# Patient Record
Sex: FEMALE | Race: OTHER | HISPANIC OR LATINO
[De-identification: names, ages, dates, MRNs, and addresses within clinical notes are randomized per-mention and may not be internally consistent; named-entity substitution may affect disease eponyms.]

---

## 2017-01-06 ENCOUNTER — TRANSCRIPTION ENCOUNTER (OUTPATIENT)
Age: 72
End: 2017-01-06

## 2021-05-03 ENCOUNTER — APPOINTMENT (OUTPATIENT)
Dept: FAMILY MEDICINE | Facility: CLINIC | Age: 76
End: 2021-05-03
Payer: MEDICAID

## 2021-05-03 ENCOUNTER — NON-APPOINTMENT (OUTPATIENT)
Age: 76
End: 2021-05-03

## 2021-05-03 VITALS
WEIGHT: 145 LBS | DIASTOLIC BLOOD PRESSURE: 78 MMHG | TEMPERATURE: 97.1 F | SYSTOLIC BLOOD PRESSURE: 110 MMHG | BODY MASS INDEX: 26.68 KG/M2 | HEIGHT: 62 IN | RESPIRATION RATE: 16 BRPM

## 2021-05-03 DIAGNOSIS — Z23 ENCOUNTER FOR IMMUNIZATION: ICD-10-CM

## 2021-05-03 DIAGNOSIS — Z13.220 ENCOUNTER FOR SCREENING FOR LIPOID DISORDERS: ICD-10-CM

## 2021-05-03 DIAGNOSIS — E66.3 OVERWEIGHT: ICD-10-CM

## 2021-05-03 DIAGNOSIS — Z13.820 ENCOUNTER FOR SCREENING FOR OSTEOPOROSIS: ICD-10-CM

## 2021-05-03 DIAGNOSIS — Z11.4 ENCOUNTER FOR SCREENING FOR HUMAN IMMUNODEFICIENCY VIRUS [HIV]: ICD-10-CM

## 2021-05-03 DIAGNOSIS — Z12.31 ENCOUNTER FOR SCREENING MAMMOGRAM FOR MALIGNANT NEOPLASM OF BREAST: ICD-10-CM

## 2021-05-03 DIAGNOSIS — Z00.00 ENCOUNTER FOR GENERAL ADULT MEDICAL EXAMINATION W/OUT ABNORMAL FINDINGS: ICD-10-CM

## 2021-05-03 DIAGNOSIS — Z13.6 ENCOUNTER FOR SCREENING FOR CARDIOVASCULAR DISORDERS: ICD-10-CM

## 2021-05-03 DIAGNOSIS — Z80.0 FAMILY HISTORY OF MALIGNANT NEOPLASM OF DIGESTIVE ORGANS: ICD-10-CM

## 2021-05-03 DIAGNOSIS — Z13.1 ENCOUNTER FOR SCREENING FOR DIABETES MELLITUS: ICD-10-CM

## 2021-05-03 DIAGNOSIS — Z11.59 ENCOUNTER FOR SCREENING FOR OTHER VIRAL DISEASES: ICD-10-CM

## 2021-05-03 PROCEDURE — 99387 INIT PM E/M NEW PAT 65+ YRS: CPT | Mod: 25

## 2021-05-03 PROCEDURE — 99072 ADDL SUPL MATRL&STAF TM PHE: CPT

## 2021-05-03 PROCEDURE — 99203 OFFICE O/P NEW LOW 30 MIN: CPT | Mod: 25

## 2021-05-03 PROCEDURE — 93000 ELECTROCARDIOGRAM COMPLETE: CPT | Mod: 59

## 2021-05-03 NOTE — PHYSICAL EXAM
[No Acute Distress] : no acute distress [Well Nourished] : well nourished [Well Developed] : well developed [Well-Appearing] : well-appearing [Normal Voice/Communication] : normal voice/communication [Normal Sclera/Conjunctiva] : normal sclera/conjunctiva [PERRL] : pupils equal round and reactive to light [EOMI] : extraocular movements intact [Normal Outer Ear/Nose] : the outer ears and nose were normal in appearance [Normal Oropharynx] : the oropharynx was normal [Normal TMs] : both tympanic membranes were normal [Normal Nasal Mucosa] : the nasal mucosa was normal [No JVD] : no jugular venous distention [No Lymphadenopathy] : no lymphadenopathy [Supple] : supple [Thyroid Normal, No Nodules] : the thyroid was normal and there were no nodules present [No Respiratory Distress] : no respiratory distress  [No Accessory Muscle Use] : no accessory muscle use [Clear to Auscultation] : lungs were clear to auscultation bilaterally [Normal Rate] : normal rate  [Regular Rhythm] : with a regular rhythm [Normal S1, S2] : normal S1 and S2 [No Murmur] : no murmur heard [No Carotid Bruits] : no carotid bruits [No Abdominal Bruit] : a ~M bruit was not heard ~T in the abdomen [Pedal Pulses Present] : the pedal pulses are present [No Edema] : there was no peripheral edema [No Palpable Aorta] : no palpable aorta [No Extremity Clubbing/Cyanosis] : no extremity clubbing/cyanosis [Soft] : abdomen soft [Non Tender] : non-tender [Non-distended] : non-distended [No Masses] : no abdominal mass palpated [No HSM] : no HSM [Normal Bowel Sounds] : normal bowel sounds [Normal Supraclavicular Nodes] : no supraclavicular lymphadenopathy [Normal Posterior Cervical Nodes] : no posterior cervical lymphadenopathy [Normal Anterior Cervical Nodes] : no anterior cervical lymphadenopathy [No CVA Tenderness] : no CVA  tenderness [No Spinal Tenderness] : no spinal tenderness [No Joint Swelling] : no joint swelling [Grossly Normal Strength/Tone] : grossly normal strength/tone [No Rash] : no rash [Coordination Grossly Intact] : coordination grossly intact [No Focal Deficits] : no focal deficits [Normal Gait] : normal gait [Cranial Nerves Oculomotor (III)] : the extraocular motions were intact [Cranial Nerves Trigeminal (V)] : sensation to the face and masseter strength were intact [Cranial Nerves Facial (VII)] : facial strength was intact bilaterally [Cranial Nerves Accessory (XI - Cranial And Spinal)] : shoulder shrug was intact bilaterally [Cranial Nerves Hypoglossal (XII)] : there was no tongue deviation with protrusion [Sensation Tactile Decrease] : light touch was intact [2+] : left 2+ [Speech Grossly Normal] : speech grossly normal [Normal Affect] : the affect was normal [Normal Mood] : the mood was normal [Normal Insight/Judgement] : insight and judgment were intact

## 2021-05-07 LAB
ALBUMIN SERPL ELPH-MCNC: 4.8 G/DL
ALP BLD-CCNC: 77 U/L
ALT SERPL-CCNC: 17 U/L
ANION GAP SERPL CALC-SCNC: 13 MMOL/L
AST SERPL-CCNC: 20 U/L
BASOPHILS # BLD AUTO: 0.03 K/UL
BASOPHILS NFR BLD AUTO: 0.5 %
BILIRUB SERPL-MCNC: 0.3 MG/DL
BUN SERPL-MCNC: 16 MG/DL
CALCIUM SERPL-MCNC: 9.8 MG/DL
CHLORIDE SERPL-SCNC: 102 MMOL/L
CHOLEST SERPL-MCNC: 288 MG/DL
CO2 SERPL-SCNC: 26 MMOL/L
CREAT SERPL-MCNC: 0.75 MG/DL
EOSINOPHIL # BLD AUTO: 0.2 K/UL
EOSINOPHIL NFR BLD AUTO: 3.1 %
ESTIMATED AVERAGE GLUCOSE: 120 MG/DL
GLUCOSE SERPL-MCNC: 106 MG/DL
HBA1C MFR BLD HPLC: 5.8 %
HCT VFR BLD CALC: 44 %
HCV AB SER QL: NONREACTIVE
HCV S/CO RATIO: 0.12 S/CO
HDLC SERPL-MCNC: 61 MG/DL
HGB BLD-MCNC: 13.9 G/DL
HIV1+2 AB SPEC QL IA.RAPID: NONREACTIVE
IMM GRANULOCYTES NFR BLD AUTO: 0.2 %
LDLC SERPL CALC-MCNC: 184 MG/DL
LYMPHOCYTES # BLD AUTO: 2.54 K/UL
LYMPHOCYTES NFR BLD AUTO: 39.4 %
MAN DIFF?: NORMAL
MCHC RBC-ENTMCNC: 29.7 PG
MCHC RBC-ENTMCNC: 31.6 GM/DL
MCV RBC AUTO: 94 FL
MONOCYTES # BLD AUTO: 0.57 K/UL
MONOCYTES NFR BLD AUTO: 8.8 %
NEUTROPHILS # BLD AUTO: 3.1 K/UL
NEUTROPHILS NFR BLD AUTO: 48 %
NONHDLC SERPL-MCNC: 227 MG/DL
PLATELET # BLD AUTO: 298 K/UL
POTASSIUM SERPL-SCNC: 3.9 MMOL/L
PROT SERPL-MCNC: 7.9 G/DL
RBC # BLD: 4.68 M/UL
RBC # FLD: 12.9 %
SODIUM SERPL-SCNC: 141 MMOL/L
TRIGL SERPL-MCNC: 213 MG/DL
TSH SERPL-ACNC: 2.52 UIU/ML
WBC # FLD AUTO: 6.45 K/UL

## 2021-05-07 NOTE — HISTORY OF PRESENT ILLNESS
[FreeTextEntry1] : establish care [de-identified] : Ms. JULIA ALCANTARA is a pleasant 75 year old female with PMH of hypothyroidism s/p thyroid ablation due to hyperthyroidism? who comes in to the office accompanied by her daughter  Arthur to establish care and for sleeping problems. Reports that only sleeps for about 3 hrs, then she wakes up or any noise but she can't sleep deeply. This has happened for months already.\par Her levothyroxine is being managed by endocrinology\par AS per daughter she has hstory of depression, last time 1 year ago, currently not on medications\par Has not seen a PCP wince 2016\par \par Endocrinologist: Matt Mccall

## 2021-05-07 NOTE — HEALTH RISK ASSESSMENT
[Yes] : Yes [2 - 4 times a month (2 pts)] : 2-4 times a month (2 points) [1 or 2 (0 pts)] : 1 or 2 (0 points) [Never (0 pts)] : Never (0 points) [No] : In the past 12 months have you used drugs other than those required for medical reasons? No [0] : 2) Feeling down, depressed, or hopeless: Not at all (0) [Patient declined PAP Smear] : Patient declined PAP Smear [HIV Test offered] : HIV Test offered [Hepatitis C test offered] : Hepatitis C test offered [With Significant Other] : lives with significant other [] :  [Fully functional (bathing, dressing, toileting, transferring, walking, feeding)] : Fully functional (bathing, dressing, toileting, transferring, walking, feeding) [Fully functional (using the telephone, shopping, preparing meals, housekeeping, doing laundry, using] : Fully functional and needs no help or supervision to perform IADLs (using the telephone, shopping, preparing meals, housekeeping, doing laundry, using transportation, managing medications and managing finances) [Patient/Caregiver not ready to engage] : Patient/Caregiver not ready to engage [Patient reported colonoscopy was abnormal] : Patient reported colonoscopy was abnormal [] : No [Audit-CScore] : 2 [[id number]] : 0 [Reports changes in hearing] : Reports no changes in hearing [Reports changes in vision] : Reports no changes in vision [MammogramComments] : w [PapSmearComments] : never had it done, not interested [ColonoscopyDate] : 2010 [ColonoscopyComments] : had polyps and diverticulosis   [AdvancecareDate] : 05/03/2021

## 2021-05-07 NOTE — ASSESSMENT
[FreeTextEntry1] : \par In regards annual physical exam: \par Not interested in any vaccinations today\par Lab test ordered as noted.\par Colon cancer screen: referring to GI for colonoscopy. As per daughter she had diverticulosis and polyps in a colonoscopy in 2010 but I don't have the records\par Ordering mammogram. Patient reports a sister with an abnormal breast exam. \par Has never had a pap smear nor is interested in one.\par Osteoporosis screen: DEXA ordered\par EKG reviewed: no acute ST or T-wave abnormalities, patient without cardiac symptoms. \par As per daughter Arthur she has history of depression but patient denies it. Arthur reports that her last crisis was 1 year ago and that has been off medications since then. Today's PHQ-2 is 0\par  \par Regarding advanced care planning: I discussed with this patient the different options and importance of advance care planning, including but not limited to health care proxy designation. Please refer to advance care planning session of this note for detailed information \par \par Hypothyroidism \par On levothyroxine by endocrino\par Checking TSH\par \par Insomnia\par Advised to take melatonin\par Referring to neurology\par \par Knee pain\par Uses voltaren intermittently with good response. Renewing but I will need her pharmacy info to send it\par Takes NSAIDs and ASA. Advised to avoid them as they could hurt her stomach\par \par Will need records from previous PCP and endocrinology\par Return to care: within 3 months or earlier if needed\par Call or return for any questions

## 2021-05-07 NOTE — ADDENDUM
[FreeTextEntry1] : 2:03 PM05/07/2021 Test results from 05/03/2021 reviewed and discussed with patient and daughter Arthur over the phone. TSH in normal wange, to continue same dose of levothyroxine. Pe-DM: HbA1C 5.8% and HLD on lipid panel. LIfestyle modifications discussed and starting atorvastatin 40 mg QHS. WIll check LFTs within 6 weeks. Rest of labs unremarkable. Still pending mammogram, DEXA and to see specialists

## 2021-07-12 ENCOUNTER — RX RENEWAL (OUTPATIENT)
Age: 76
End: 2021-07-12

## 2021-08-26 ENCOUNTER — APPOINTMENT (OUTPATIENT)
Dept: RADIOLOGY | Facility: CLINIC | Age: 76
End: 2021-08-26

## 2021-08-26 ENCOUNTER — RESULT REVIEW (OUTPATIENT)
Age: 76
End: 2021-08-26

## 2021-08-26 ENCOUNTER — OUTPATIENT (OUTPATIENT)
Dept: OUTPATIENT SERVICES | Facility: HOSPITAL | Age: 76
LOS: 1 days | End: 2021-08-26
Payer: MEDICAID

## 2021-08-26 DIAGNOSIS — Z00.8 ENCOUNTER FOR OTHER GENERAL EXAMINATION: ICD-10-CM

## 2021-08-26 PROCEDURE — 77085 DXA BONE DENSITY AXL VRT FX: CPT

## 2021-08-26 PROCEDURE — 77085 DXA BONE DENSITY AXL VRT FX: CPT | Mod: 26

## 2021-08-27 ENCOUNTER — APPOINTMENT (OUTPATIENT)
Dept: FAMILY MEDICINE | Facility: CLINIC | Age: 76
End: 2021-08-27
Payer: COMMERCIAL

## 2021-08-27 VITALS
SYSTOLIC BLOOD PRESSURE: 106 MMHG | TEMPERATURE: 97 F | DIASTOLIC BLOOD PRESSURE: 80 MMHG | HEART RATE: 84 BPM | HEIGHT: 62 IN | WEIGHT: 145 LBS | BODY MASS INDEX: 26.68 KG/M2

## 2021-08-27 DIAGNOSIS — M41.80 OTHER FORMS OF SCOLIOSIS, SITE UNSPECIFIED: ICD-10-CM

## 2021-08-27 DIAGNOSIS — M25.569 PAIN IN UNSPECIFIED KNEE: ICD-10-CM

## 2021-08-27 PROCEDURE — 99215 OFFICE O/P EST HI 40 MIN: CPT | Mod: 25

## 2021-08-30 LAB
CHOLEST SERPL-MCNC: 282 MG/DL
HDLC SERPL-MCNC: 55 MG/DL
LDLC SERPL CALC-MCNC: 199 MG/DL
NONHDLC SERPL-MCNC: 228 MG/DL
TRIGL SERPL-MCNC: 143 MG/DL

## 2021-08-30 NOTE — HISTORY OF PRESENT ILLNESS
[FreeTextEntry1] : follow up [de-identified] : Ms. JULIA ALCANTARA is a pleasant 75 year old female with PMH of depression, hypothyroidism s/p thyroid ablation due to hyperthyroidism? managed by endocrinology, AMIE who comes in to the office accompanied by her daughter Arthur for medical condition follow up and for DEXA results. \par Patient was started on atorvastatin recently for HLD, was advised to come for repeat LFTs but patient missed her appointment. Patient has not been taking her medications because she vomited and would rather naturists ways. \par Patient didn’t do her mammogram, she is not interested in doing so.\par As per daughter she has history of depression, last time 1 year ago, currently not on medications. Felicity thinks that patient is depressed again as she has been more quiet, sad, less conversant. She has not been o medications for > 1 year now since they moved from [place] and has not seen a psychiatrist. Patient denies suicidal thoughts or other complaints\par I referred them to neurology last time due to insomnia but they were not able to make an appointment as they were told that she needs a psychiatrist.\par \par Endocrinologist: Matt Mccall

## 2021-08-30 NOTE — ADDENDUM
[FreeTextEntry1] : 11:41 AM08/30/2021 Test results from 08/27/2021 reviewed and discussed with patient's marinoter over the phone\par HLD, not improving. Patient was advised to restart her statins during last visit. \par Rest of labs unremarkable

## 2021-08-30 NOTE — ASSESSMENT
[FreeTextEntry1] : \par In regards to hyperlipidemia:\par Lifestyle modifications discussed\par Advised regarding the benefits of statins. Patient agrees to try them again\par Checking lipid panel but may not be improving as she didn't take the statins\par \par Depression\par Hsa not seen a psychiatry for > 1 year. REferring to another one\par She was on Mirtazapine, unclear dose. Will restart her on 15 mg QD\par Denies suicidal ideations\par Denies having weapons at home\par Has good support from daughter Arthur\par I am concerned about other overlapping mood disorder like bipolar or other conditions either undiagnosed or that are known to her previous psychiatrist. Advised that will need the records.\par Ordering a Head CT\par \par Osteoporosis\par DEXA results noted. Patient has another chart where the results were sent from the imaging place.\par Denies history of fractures\par Referring to rheumatology\par \par Insomnia\par Referring to neurology again\par Ordering Head CT as noted above\par \par Chronic knee pain\par Patient requests a Voltaren renewal\par \par Not interested in mammogram. Advised regarding the risks and benefits including but not limited to breast cancer and death. As per Arthur, patient has never been interested in doing mammograms. \par \par Has pending GI, reports that has been waiting for the appointments. Will try to expedite it.\par Return to care: within q month for depression follow up or earlier if needed\par Call or return for any questions

## 2021-08-30 NOTE — HISTORY OF PRESENT ILLNESS
[FreeTextEntry1] : follow up [de-identified] : Ms. JULIA ALCANTARA is a pleasant 75 year old female with PMH of depression, hypothyroidism s/p thyroid ablation due to hyperthyroidism? managed by endocrinology, AMIE who comes in to the office accompanied by her daughter Arthur for medical condition follow up and for DEXA results. \par Patient was started on atorvastatin recently for HLD, was advised to come for repeat LFTs but patient missed her appointment. Patient has not been taking her medications because she vomited and would rather naturists ways. \par Patient didn’t do her mammogram, she is not interested in doing so.\par As per daughter she has history of depression, last time 1 year ago, currently not on medications. Felicity thinks that patient is depressed again as she has been more quiet, sad, less conversant. She has not been o medications for > 1 year now since they moved from [place] and has not seen a psychiatrist. Patient denies suicidal thoughts or other complaints\par I referred them to neurology last time due to insomnia but they were not able to make an appointment as they were told that she needs a psychiatrist.\par \par Endocrinologist: Matt Mccall

## 2021-09-24 ENCOUNTER — APPOINTMENT (OUTPATIENT)
Dept: FAMILY MEDICINE | Facility: CLINIC | Age: 76
End: 2021-09-24
Payer: COMMERCIAL

## 2021-09-24 DIAGNOSIS — G47.00 INSOMNIA, UNSPECIFIED: ICD-10-CM

## 2021-09-24 PROCEDURE — 99443: CPT | Mod: 95

## 2021-09-24 NOTE — HISTORY OF PRESENT ILLNESS
[Home] : at home, [unfilled] , at the time of the visit. [Medical Office: ([facility])___] : at the medical office located in  [Family Member] : family member [Verbal consent obtained from patient] : the patient, [unfilled] [de-identified] : Ms. JULIA ALCANTARA is a pleasant 75 year old female with PMH of depression, hypothyroidism s/p thyroid ablation due to hyperthyroidism? managed by endocrinology, AMIE who is being seen via telephone visit with her daughter Arthur for medical condition follow up.\par Due to her depression I restarted her mirtazapine, at 15 mg 1 month ago (she was > 1 year out of it). As per Arthur she still has a depressed mood, she still doesn't leave her room as much but she is sleeping better, now, she is watching TV. She doesn't talk about suicidal thoughts. She was referred to neurology and a Head CT was ordered but she has still to make those appointments\par Patient still doesn't want to take the statins as discussed during last visit.\par Patient didn’t do her mammogram, she is not interested in doing so.\par She has not been o medications for > 1 year now since they moved from [place] and has not seen a psychiatrist. I referred her to psych again on 08/20/2021, she still has to see them\par Patient is also pending to  endocrinology.\par \par Endocrinologist: Matt Mccall

## 2021-09-24 NOTE — ASSESSMENT
[FreeTextEntry1] : \par Depression\par Has not seen a psychiatry for > 1 year. Was referred last time.\par She was on Mirtazapine, unclear dose. I restarted it at 15 mg QD. There is minimal improvement. \par Increasing the dose to 30 mg QD\par As per daughter Arthur she denies suicidal ideations\par Has good support from daughter Arthur\par I am concerned about other overlapping mood disorder like bipolar or other conditions either undiagnosed or that are known to her previous psychiatrist. Advised that will need the records.\par Pending Head CT\par \par In regards to hyperlipidemia:\par Lipid panel not in target\par Patient doesn’t want to take statins, risk and benefits were discussed during previous visit. Daughter Arthur has been trying to give it to her but she doesn't want to take it.\par \par Insomnia\par She is sleeping better now\par Pending to see neurology\par Pending Head CT as noted above\par \par Osteoporosis\par Still pending to see rheumatology\par \par Hypothyroidism\par managed by endocrinology\par Not interested in mammogram or in taking statins (see last note)\par Has pending GI appointment.\par Return to care: within 2 weeks for depression follow up or earlier if needed\par Call or return for any questions

## 2021-10-11 ENCOUNTER — APPOINTMENT (OUTPATIENT)
Dept: GASTROENTEROLOGY | Facility: CLINIC | Age: 76
End: 2021-10-11
Payer: MEDICAID

## 2021-10-11 VITALS
OXYGEN SATURATION: 98 % | RESPIRATION RATE: 14 BRPM | TEMPERATURE: 97.6 F | WEIGHT: 138 LBS | DIASTOLIC BLOOD PRESSURE: 76 MMHG | HEIGHT: 62 IN | BODY MASS INDEX: 25.4 KG/M2 | HEART RATE: 86 BPM | SYSTOLIC BLOOD PRESSURE: 112 MMHG

## 2021-10-11 DIAGNOSIS — Z86.39 PERSONAL HISTORY OF OTHER ENDOCRINE, NUTRITIONAL AND METABOLIC DISEASE: ICD-10-CM

## 2021-10-11 DIAGNOSIS — M19.90 UNSPECIFIED OSTEOARTHRITIS, UNSPECIFIED SITE: ICD-10-CM

## 2021-10-11 DIAGNOSIS — Z78.9 OTHER SPECIFIED HEALTH STATUS: ICD-10-CM

## 2021-10-11 DIAGNOSIS — Z12.11 ENCOUNTER FOR SCREENING FOR MALIGNANT NEOPLASM OF COLON: ICD-10-CM

## 2021-10-11 PROCEDURE — 99203 OFFICE O/P NEW LOW 30 MIN: CPT

## 2021-10-11 RX ORDER — DICLOFENAC SODIUM 10 MG/G
1 GEL TOPICAL
Refills: 0 | Status: DISCONTINUED | COMMUNITY
End: 2021-10-11

## 2021-10-11 RX ORDER — ACETAMINOPHEN 325 MG/1
325 TABLET, FILM COATED ORAL EVERY 6 HOURS
Qty: 40 | Refills: 0 | Status: DISCONTINUED | COMMUNITY
Start: 2021-05-07 | End: 2021-10-11

## 2021-10-11 NOTE — PHYSICAL EXAM
[General Appearance - Alert] : alert [General Appearance - Well Nourished] : well nourished [General Appearance - In No Acute Distress] : in no acute distress [General Appearance - Well Developed] : well developed [General Appearance - Well-Appearing] : healthy appearing [PERRL With Normal Accommodation] : pupils were equal in size, round, and reactive to light [Sclera] : the sclera and conjunctiva were normal [Extraocular Movements] : extraocular movements were intact [Outer Ear] : the ears and nose were normal in appearance [Oropharynx] : the oropharynx was normal [Neck Appearance] : the appearance of the neck was normal [Jugular Venous Distention Increased] : there was no jugular-venous distention [Neck Cervical Mass (___cm)] : no neck mass was observed [Thyroid Diffuse Enlargement] : the thyroid was not enlarged [Thyroid Nodule] : there were no palpable thyroid nodules [Auscultation Breath Sounds / Voice Sounds] : lungs were clear to auscultation bilaterally [Heart Rate And Rhythm] : heart rate was normal and rhythm regular [Heart Sounds] : normal S1 and S2 [Heart Sounds Gallop] : no gallops [Murmurs] : no murmurs [Heart Sounds Pericardial Friction Rub] : no pericardial rub [Bowel Sounds] : normal bowel sounds [Abdomen Soft] : soft [Abdomen Tenderness] : non-tender [] : no hepato-splenomegaly [Abdomen Mass (___ Cm)] : no abdominal mass palpated [No CVA Tenderness] : no ~M costovertebral angle tenderness [Abnormal Walk] : normal gait [Musculoskeletal - Swelling] : no joint swelling seen [Skin Color & Pigmentation] : normal skin color and pigmentation [Skin Turgor] : normal skin turgor [Oriented To Time, Place, And Person] : oriented to person, place, and time [FreeTextEntry1] : Deferred until colonoscopy

## 2021-10-11 NOTE — HISTORY OF PRESENT ILLNESS
[None] : had no significant interval events [Heartburn] : denies heartburn [Nausea] : denies nausea [Vomiting] : denies vomiting [Diarrhea] : denies diarrhea [Constipation] : denies constipation [Yellow Skin Or Eyes (Jaundice)] : denies jaundice [Abdominal Pain] : denies abdominal pain [Abdominal Swelling] : denies abdominal swelling [Rectal Pain] : denies rectal pain [Abdominal Surgery] : abdominal surgery [Appendectomy] : appendectomy [Wt Gain ___ Lbs] : no recent weight gain [GERD] : no gastroesophageal reflux disease [Wt Loss ___ Lbs] : no recent weight loss [Hiatus Hernia] : no hiatus hernia [Peptic Ulcer Disease] : no peptic ulcer disease [Pancreatitis] : no pancreatitis [Cholelithiasis] : no cholelithiasis [Kidney Stone] : no kidney stone [Inflammatory Bowel Disease] : no inflammatory bowel disease [Irritable Bowel Syndrome] : no irritable bowel syndrome [Diverticulitis] : no diverticulitis [Alcohol Abuse] : no alcohol abuse [Malignancy] : no malignancy [Cholecystectomy] : no cholecystectomy [de-identified] : This is the patient's first visit here [de-identified] : Patient presents for initial evaluation for screening colonoscopy having had a negative colonoscopy roughly 11 years ago.  She was accompanied by her daughter who states that when she had her colonoscopy done 11 years ago it could not be completed because she had a very tortuous colon and she was sent for further imaging of her colon with either barium enema or virtual colonoscopy which showed no colonic pathology.  She has had no subsequent colon cancer screening and has no complaints of constipation or diarrhea or abdominal pain or unexplained weight loss or anorexia or gross hematochezia.

## 2021-10-11 NOTE — ASSESSMENT
[FreeTextEntry1] : Impression: Colon cancer screening rule out colonic neoplasm.  Patient's last colonoscopy was 11 years ago.\par \par Recommendations: Repeat low rescreening colonoscopy was advised for continued CRC screening.  The risk versus benefits of repeat colonoscopy and intravenous sedation, and alternative testing such as virtual colonoscopy, were individually explained to the patient today via her daughter who accompanied the patient and acted as an  for her.  Both the patient and her daughter appeared to understand all of the above and were agreeable to proceeding with repeat colonoscopy.  Her ASA classification is 2.  She will be prepped with [facility] and is medically optimized for the proposed colonoscopy and appeared to understand all of the above instructions, information, and management plan.

## 2021-10-14 ENCOUNTER — RX RENEWAL (OUTPATIENT)
Age: 76
End: 2021-10-14

## 2021-10-18 ENCOUNTER — APPOINTMENT (OUTPATIENT)
Dept: CT IMAGING | Facility: CLINIC | Age: 76
End: 2021-10-18
Payer: COMMERCIAL

## 2021-10-18 ENCOUNTER — OUTPATIENT (OUTPATIENT)
Dept: OUTPATIENT SERVICES | Facility: HOSPITAL | Age: 76
LOS: 1 days | End: 2021-10-18
Payer: MEDICAID

## 2021-10-18 ENCOUNTER — RESULT REVIEW (OUTPATIENT)
Age: 76
End: 2021-10-18

## 2021-10-18 DIAGNOSIS — G47.00 INSOMNIA, UNSPECIFIED: ICD-10-CM

## 2021-10-18 DIAGNOSIS — F32.A DEPRESSION, UNSPECIFIED: ICD-10-CM

## 2021-10-18 PROCEDURE — 70450 CT HEAD/BRAIN W/O DYE: CPT

## 2021-10-18 PROCEDURE — 70450 CT HEAD/BRAIN W/O DYE: CPT | Mod: 26

## 2021-10-22 ENCOUNTER — APPOINTMENT (OUTPATIENT)
Dept: FAMILY MEDICINE | Facility: CLINIC | Age: 76
End: 2021-10-22
Payer: COMMERCIAL

## 2021-10-22 ENCOUNTER — LABORATORY RESULT (OUTPATIENT)
Age: 76
End: 2021-10-22

## 2021-10-22 VITALS
TEMPERATURE: 98 F | HEIGHT: 62 IN | DIASTOLIC BLOOD PRESSURE: 75 MMHG | BODY MASS INDEX: 25.4 KG/M2 | WEIGHT: 138 LBS | SYSTOLIC BLOOD PRESSURE: 140 MMHG

## 2021-10-22 DIAGNOSIS — R41.82 ALTERED MENTAL STATUS, UNSPECIFIED: ICD-10-CM

## 2021-10-22 PROCEDURE — 99215 OFFICE O/P EST HI 40 MIN: CPT | Mod: 25

## 2021-10-27 LAB
ALBUMIN SERPL ELPH-MCNC: 4.4 G/DL
ALP BLD-CCNC: 85 U/L
ALT SERPL-CCNC: 18 U/L
ANION GAP SERPL CALC-SCNC: 15 MMOL/L
AST SERPL-CCNC: 17 U/L
BILIRUB SERPL-MCNC: 0.4 MG/DL
BUN SERPL-MCNC: 14 MG/DL
CALCIUM SERPL-MCNC: 9.8 MG/DL
CHLORIDE SERPL-SCNC: 103 MMOL/L
CHOLEST SERPL-MCNC: 288 MG/DL
CO2 SERPL-SCNC: 22 MMOL/L
CREAT SERPL-MCNC: 0.68 MG/DL
GLUCOSE SERPL-MCNC: 90 MG/DL
HDLC SERPL-MCNC: 48 MG/DL
LDLC SERPL CALC-MCNC: 198 MG/DL
NONHDLC SERPL-MCNC: 240 MG/DL
POTASSIUM SERPL-SCNC: 4.5 MMOL/L
PROT SERPL-MCNC: 7.3 G/DL
SODIUM SERPL-SCNC: 140 MMOL/L
TRIGL SERPL-MCNC: 214 MG/DL
TSH SERPL-ACNC: 12.8 UIU/ML

## 2021-10-27 NOTE — DATA REVIEWED
[FreeTextEntry1] : Additional time spent outside of H&P in I reviewing information including but not limited to previous notes, imaging results and laboratories for this patient

## 2021-10-27 NOTE — ADDENDUM
[FreeTextEntry1] : 1:22 PM10/27/2021 Test results from  reviewed and discussed with patient over the phone\par HLD, not in target. Patient is on statins, every other day. Encouraged daily but patient rather to continue it every other day. Lifestyle modifications discussed. Patient will ask her endocrinologist. Discussed with daughter Gianna.\par TSH not in target. would increase dose ot levothyroxine. Her hypothyroidism is managed by endocrino Dr Gutierrez. Discussed with patient's daughter Gianna. They will ask endocrinology. WIll send this results to Dr Gutierrez\par Rest of labs unremarkable

## 2021-10-27 NOTE — HISTORY OF PRESENT ILLNESS
[FreeTextEntry1] : follow up [de-identified] : Ms. JULIA ALCANTARA is a pleasant 75 year old female with PMH of depression, hypothyroidism s/p thyroid ablation due to hyperthyroidism? managed by endocrinology, HLADRIEL who comes in to the office with her daughter Arthur for medical condition follow up.\par Due to her depression I increased her mirtazapine to 30 mg 1 month ago (she was > 1 year out of it). As per Arthur she still has a depressed mood but is improving, she started to get out of her room now spends some time with her, is watching the news. She doesn't talk about suicidal thoughts. Head CT was unremarkable on 10/18/2021 She was referred to psychiatry, neurology but she has still to make those appointments. She saw GI for colon cancer screen.\par Declines mammogram\par Patient still doesn't want to take the statins as discussed during last visit.\par Patient didn’t do her mammogram, she is not interested in doing so.\par \par [place] [facility] [phone]

## 2021-10-27 NOTE — ASSESSMENT
[FreeTextEntry1] : \par HLD\par Is taking statins, every other day\par Checking a CMP and lipid panel\par \par Depression\par Patient is more talkative today\par Improving on Mirtazapine, continue 30 mg QD for now. If no improvement next month will increase it to 45 mg QD\par No suicidal/homicidal thoughts\par Good support from her daughter\par Pending to see psychiatry\par \par Neck pain\par Seems muscular, is chronic\par No red flags\par Will try voltaren cream and Tylenol\par \par Altered mental state\par Unclear if is dementia or related to her depression \par Heat CT negative\par Was referred to neuro for further assessment, pending to see them\par \par Osteoporosis\par Last DEXA: 08/26/2021\par Tried some unspecified oral medication in the past, once/month? COuld have been Ibandronate? But I don't have those records\par Has appointment with rheumatology on jan/2021\par Was on unspecified medications in the past\par Continue Vit D and calcium, will ask her endocrinology who normally sends it\par \par Hypothyroidism\par On levothyroxine. managed by endocrino\par Checking her TSH as per daughter's request so they will give the results to her endocrinology\par \par Declines all vaccinations, COVID-19 vaccine encouraged, patient wants to ask her endocrinologist\par \par Return to care: within 1 month for depression  follow up or earlier if needed\par Call or return for any questions

## 2021-10-27 NOTE — PHYSICAL EXAM
[Normal] : normal rate, regular rhythm, normal S1 and S2 and no murmur heard [Coordination Grossly Intact] : coordination grossly intact [No Focal Deficits] : no focal deficits [Normal Gait] : normal gait [No Lymphadenopathy] : no lymphadenopathy [Supple] : supple [de-identified] : mild tenderness in right posterior area of her neck. No lesions or rash. Adequate ROM and sensation of neck  [de-identified] : Adequate ROM and sensation of both upper extremities.  [de-identified] : flat affect

## 2022-02-17 ENCOUNTER — APPOINTMENT (OUTPATIENT)
Dept: GASTROENTEROLOGY | Facility: GI CENTER | Age: 77
End: 2022-02-17

## 2022-04-24 ENCOUNTER — TRANSCRIPTION ENCOUNTER (OUTPATIENT)
Age: 77
End: 2022-04-24

## 2022-04-25 ENCOUNTER — OUTPATIENT (OUTPATIENT)
Dept: OUTPATIENT SERVICES | Facility: HOSPITAL | Age: 77
LOS: 1 days | End: 2022-04-25
Payer: COMMERCIAL

## 2022-04-25 ENCOUNTER — APPOINTMENT (OUTPATIENT)
Dept: GASTROENTEROLOGY | Facility: GI CENTER | Age: 77
End: 2022-04-25
Payer: MEDICAID

## 2022-04-25 DIAGNOSIS — Z12.11 ENCOUNTER FOR SCREENING FOR MALIGNANT NEOPLASM OF COLON: ICD-10-CM

## 2022-04-25 DIAGNOSIS — K64.8 OTHER HEMORRHOIDS: ICD-10-CM

## 2022-04-25 DIAGNOSIS — K57.30 DIVERTICULOSIS OF LARGE INTESTINE W/OUT PERFORATION OR ABSCESS W/OUT BLEEDING: ICD-10-CM

## 2022-04-25 PROCEDURE — 45378 DIAGNOSTIC COLONOSCOPY: CPT | Mod: 33

## 2022-04-25 PROCEDURE — G0105: CPT

## 2022-05-09 ENCOUNTER — APPOINTMENT (OUTPATIENT)
Dept: RHEUMATOLOGY | Facility: CLINIC | Age: 77
End: 2022-05-09
Payer: MEDICAID

## 2022-05-09 ENCOUNTER — LABORATORY RESULT (OUTPATIENT)
Age: 77
End: 2022-05-09

## 2022-05-09 VITALS
TEMPERATURE: 98.7 F | SYSTOLIC BLOOD PRESSURE: 128 MMHG | DIASTOLIC BLOOD PRESSURE: 86 MMHG | RESPIRATION RATE: 17 BRPM | HEART RATE: 73 BPM | HEIGHT: 62 IN | OXYGEN SATURATION: 96 %

## 2022-05-09 DIAGNOSIS — M54.2 CERVICALGIA: ICD-10-CM

## 2022-05-09 DIAGNOSIS — M53.9 DORSOPATHY, UNSPECIFIED: ICD-10-CM

## 2022-05-09 PROCEDURE — 99204 OFFICE O/P NEW MOD 45 MIN: CPT

## 2022-05-09 NOTE — HISTORY OF PRESENT ILLNESS
[FreeTextEntry1] : 76 year old female with PMH as listed below presents today for an initial evaluation for OP\par \par Last DEXA: OP. Lowest T score: -2.5 in spine. \par Was dx with OP in 2001. Was previously on ibandronate?  x 1 year?\par currently off calcium/vitamin d \par denies previous fx\par denies parental hip fractures\par denies estrogen use\par denies steroid use\par denies hx of inflammatory arthritides\par Has upcoming dental work. Has multiple implants- possibly infected? that need to be removed. \par denies cigarette/alcohol use\par denies GERD\par \par Lives with daughter\par Fell down last Saturday- tripped over shoes. Has construction going on in the house. \par No trauma. No new pain. \par \par Has ddd- on NSAIDS prn

## 2022-05-09 NOTE — ASSESSMENT
[FreeTextEntry1] : OP, postmenopausal\par DDD\par \par - labs as below\par - Last DEXA: OP. Lowest T score: -2.5 in spine. Was dx with OP in 2001. Was previously on ibandronate?  x 1 \par year?\par - will need dental clearance prior to starting medications\par - d/w pt risks of oral vs IV bisphosphates\par - calcium and vitamin d supplementation. \par - regular exercise: aerobic and resistance\par - fall prevention\par - c/w meloxicam 7.5mg prn daily for pain\par \par Discussed treatment plan with the patient and her daughter. The patient was given the opportunity to ask questions and all questions were answered to their satisfaction.\par

## 2022-05-12 LAB
25(OH)D3 SERPL-MCNC: 12.1 NG/ML
ALBUMIN SERPL ELPH-MCNC: 4.2 G/DL
ALP BLD-CCNC: 78 U/L
ALT SERPL-CCNC: 9 U/L
ANION GAP SERPL CALC-SCNC: 11 MMOL/L
AST SERPL-CCNC: 12 U/L
BASOPHILS # BLD AUTO: 0.03 K/UL
BASOPHILS NFR BLD AUTO: 0.5 %
BILIRUB SERPL-MCNC: 0.3 MG/DL
BUN SERPL-MCNC: 20 MG/DL
CALCIUM SERPL-MCNC: 9.4 MG/DL
CALCIUM SERPL-MCNC: 9.4 MG/DL
CHLORIDE SERPL-SCNC: 108 MMOL/L
CK SERPL-CCNC: 33 U/L
CO2 SERPL-SCNC: 26 MMOL/L
CREAT SERPL-MCNC: 0.65 MG/DL
CREAT SPEC-SCNC: 261 MG/DL
CREAT/PROT UR: 0.1 RATIO
CRP SERPL-MCNC: <3 MG/L
EGFR: 92 ML/MIN/1.73M2
EOSINOPHIL # BLD AUTO: 0.07 K/UL
EOSINOPHIL NFR BLD AUTO: 1.2 %
ERYTHROCYTE [SEDIMENTATION RATE] IN BLOOD BY WESTERGREN METHOD: 34 MM/HR
GLUCOSE SERPL-MCNC: 94 MG/DL
HCT VFR BLD CALC: 38.5 %
HGB BLD-MCNC: 12.2 G/DL
IMM GRANULOCYTES NFR BLD AUTO: 0.2 %
LYMPHOCYTES # BLD AUTO: 1.67 K/UL
LYMPHOCYTES NFR BLD AUTO: 29.5 %
MAGNESIUM SERPL-MCNC: 2.3 MG/DL
MAN DIFF?: NORMAL
MCHC RBC-ENTMCNC: 30.3 PG
MCHC RBC-ENTMCNC: 31.7 GM/DL
MCV RBC AUTO: 95.5 FL
MONOCYTES # BLD AUTO: 0.37 K/UL
MONOCYTES NFR BLD AUTO: 6.5 %
NEUTROPHILS # BLD AUTO: 3.51 K/UL
NEUTROPHILS NFR BLD AUTO: 62.1 %
PARATHYROID HORMONE INTACT: 36 PG/ML
PHOSPHATE SERPL-MCNC: 3 MG/DL
PLATELET # BLD AUTO: 296 K/UL
POTASSIUM SERPL-SCNC: 4.1 MMOL/L
PROT SERPL-MCNC: 6.8 G/DL
PROT UR-MCNC: 25 MG/DL
RBC # BLD: 4.03 M/UL
RBC # FLD: 13.2 %
SODIUM SERPL-SCNC: 145 MMOL/L
TSH SERPL-ACNC: 1.27 UIU/ML
WBC # FLD AUTO: 5.66 K/UL

## 2022-05-17 LAB
ALBUMIN MFR SERPL ELPH: 57.6 %
ALBUMIN SERPL-MCNC: 3.9 G/DL
ALBUMIN/GLOB SERPL: 1.3 RATIO
ALPHA1 GLOB MFR SERPL ELPH: 3.9 %
ALPHA1 GLOB SERPL ELPH-MCNC: 0.3 G/DL
ALPHA2 GLOB MFR SERPL ELPH: 10.8 %
ALPHA2 GLOB SERPL ELPH-MCNC: 0.7 G/DL
B-GLOBULIN MFR SERPL ELPH: 12.6 %
B-GLOBULIN SERPL ELPH-MCNC: 0.9 G/DL
COLLAGEN CTX SERPL-MCNC: 517 PG/ML
GAMMA GLOB FLD ELPH-MCNC: 1 G/DL
GAMMA GLOB MFR SERPL ELPH: 15.1 %
IGA 24H UR QL IFE: NORMAL
INTERPRETATION SERPL IEP-IMP: NORMAL
M PROTEIN SPEC IFE-MCNC: NORMAL
PROT SERPL-MCNC: 6.8 G/DL
PROT SERPL-MCNC: 6.8 G/DL

## 2022-05-23 ENCOUNTER — APPOINTMENT (OUTPATIENT)
Dept: RHEUMATOLOGY | Facility: CLINIC | Age: 77
End: 2022-05-23

## 2022-08-09 ENCOUNTER — EMERGENCY (EMERGENCY)
Facility: HOSPITAL | Age: 77
LOS: 1 days | End: 2022-08-09
Attending: EMERGENCY MEDICINE

## 2022-08-09 VITALS
TEMPERATURE: 99 F | DIASTOLIC BLOOD PRESSURE: 86 MMHG | HEART RATE: 114 BPM | SYSTOLIC BLOOD PRESSURE: 168 MMHG | RESPIRATION RATE: 22 BRPM | OXYGEN SATURATION: 94 %

## 2022-08-09 DIAGNOSIS — F29 UNSPECIFIED PSYCHOSIS NOT DUE TO A SUBSTANCE OR KNOWN PHYSIOLOGICAL CONDITION: ICD-10-CM

## 2022-08-09 LAB
ALBUMIN SERPL ELPH-MCNC: 4.4 G/DL — SIGNIFICANT CHANGE UP (ref 3.3–5.2)
ALP SERPL-CCNC: 79 U/L — SIGNIFICANT CHANGE UP (ref 40–120)
ALT FLD-CCNC: 22 U/L — SIGNIFICANT CHANGE UP
AMPHET UR-MCNC: NEGATIVE — SIGNIFICANT CHANGE UP
ANION GAP SERPL CALC-SCNC: 14 MMOL/L — SIGNIFICANT CHANGE UP (ref 5–17)
APAP SERPL-MCNC: <3 UG/ML — LOW (ref 10–26)
APPEARANCE UR: CLEAR — SIGNIFICANT CHANGE UP
AST SERPL-CCNC: 25 U/L — SIGNIFICANT CHANGE UP
BACTERIA # UR AUTO: ABNORMAL
BARBITURATES UR SCN-MCNC: NEGATIVE — SIGNIFICANT CHANGE UP
BASOPHILS # BLD AUTO: 0.04 K/UL — SIGNIFICANT CHANGE UP (ref 0–0.2)
BASOPHILS NFR BLD AUTO: 0.4 % — SIGNIFICANT CHANGE UP (ref 0–2)
BENZODIAZ UR-MCNC: NEGATIVE — SIGNIFICANT CHANGE UP
BILIRUB SERPL-MCNC: 0.4 MG/DL — SIGNIFICANT CHANGE UP (ref 0.4–2)
BILIRUB UR-MCNC: NEGATIVE — SIGNIFICANT CHANGE UP
BUN SERPL-MCNC: 24.8 MG/DL — HIGH (ref 8–20)
CALCIUM SERPL-MCNC: 9.2 MG/DL — SIGNIFICANT CHANGE UP (ref 8.4–10.5)
CHLORIDE SERPL-SCNC: 105 MMOL/L — SIGNIFICANT CHANGE UP (ref 98–107)
CO2 SERPL-SCNC: 21 MMOL/L — LOW (ref 22–29)
COCAINE METAB.OTHER UR-MCNC: NEGATIVE — SIGNIFICANT CHANGE UP
COLOR SPEC: YELLOW — SIGNIFICANT CHANGE UP
CREAT SERPL-MCNC: 0.86 MG/DL — SIGNIFICANT CHANGE UP (ref 0.5–1.3)
DIFF PNL FLD: ABNORMAL
EGFR: 70 ML/MIN/1.73M2 — SIGNIFICANT CHANGE UP
EOSINOPHIL # BLD AUTO: 0.01 K/UL — SIGNIFICANT CHANGE UP (ref 0–0.5)
EOSINOPHIL NFR BLD AUTO: 0.1 % — SIGNIFICANT CHANGE UP (ref 0–6)
EPI CELLS # UR: SIGNIFICANT CHANGE UP
ETHANOL SERPL-MCNC: <10 MG/DL — SIGNIFICANT CHANGE UP (ref 0–9)
GLUCOSE SERPL-MCNC: 121 MG/DL — HIGH (ref 70–99)
GLUCOSE UR QL: NEGATIVE MG/DL — SIGNIFICANT CHANGE UP
HCT VFR BLD CALC: 36.5 % — SIGNIFICANT CHANGE UP (ref 34.5–45)
HGB BLD-MCNC: 12.1 G/DL — SIGNIFICANT CHANGE UP (ref 11.5–15.5)
HIV 1 & 2 AB SERPL IA.RAPID: SIGNIFICANT CHANGE UP
IMM GRANULOCYTES NFR BLD AUTO: 0.4 % — SIGNIFICANT CHANGE UP (ref 0–1.5)
KETONES UR-MCNC: NEGATIVE — SIGNIFICANT CHANGE UP
LEUKOCYTE ESTERASE UR-ACNC: NEGATIVE — SIGNIFICANT CHANGE UP
LYMPHOCYTES # BLD AUTO: 0.76 K/UL — LOW (ref 1–3.3)
LYMPHOCYTES # BLD AUTO: 7 % — LOW (ref 13–44)
MCHC RBC-ENTMCNC: 30.6 PG — SIGNIFICANT CHANGE UP (ref 27–34)
MCHC RBC-ENTMCNC: 33.2 GM/DL — SIGNIFICANT CHANGE UP (ref 32–36)
MCV RBC AUTO: 92.2 FL — SIGNIFICANT CHANGE UP (ref 80–100)
METHADONE UR-MCNC: NEGATIVE — SIGNIFICANT CHANGE UP
MONOCYTES # BLD AUTO: 0.49 K/UL — SIGNIFICANT CHANGE UP (ref 0–0.9)
MONOCYTES NFR BLD AUTO: 4.5 % — SIGNIFICANT CHANGE UP (ref 2–14)
NEUTROPHILS # BLD AUTO: 9.54 K/UL — HIGH (ref 1.8–7.4)
NEUTROPHILS NFR BLD AUTO: 87.6 % — HIGH (ref 43–77)
NITRITE UR-MCNC: NEGATIVE — SIGNIFICANT CHANGE UP
NT-PROBNP SERPL-SCNC: 350 PG/ML — HIGH (ref 0–300)
OPIATES UR-MCNC: NEGATIVE — SIGNIFICANT CHANGE UP
PCP SPEC-MCNC: SIGNIFICANT CHANGE UP
PCP UR-MCNC: NEGATIVE — SIGNIFICANT CHANGE UP
PH UR: 7 — SIGNIFICANT CHANGE UP (ref 5–8)
PLATELET # BLD AUTO: 322 K/UL — SIGNIFICANT CHANGE UP (ref 150–400)
POTASSIUM SERPL-MCNC: 4.5 MMOL/L — SIGNIFICANT CHANGE UP (ref 3.5–5.3)
POTASSIUM SERPL-SCNC: 4.5 MMOL/L — SIGNIFICANT CHANGE UP (ref 3.5–5.3)
PROT SERPL-MCNC: 7.7 G/DL — SIGNIFICANT CHANGE UP (ref 6.6–8.7)
PROT UR-MCNC: NEGATIVE — SIGNIFICANT CHANGE UP
RAPID RVP RESULT: SIGNIFICANT CHANGE UP
RBC # BLD: 3.96 M/UL — SIGNIFICANT CHANGE UP (ref 3.8–5.2)
RBC # FLD: 13 % — SIGNIFICANT CHANGE UP (ref 10.3–14.5)
RBC CASTS # UR COMP ASSIST: SIGNIFICANT CHANGE UP /HPF (ref 0–4)
SALICYLATES SERPL-MCNC: <0.6 MG/DL — LOW (ref 10–20)
SARS-COV-2 RNA SPEC QL NAA+PROBE: SIGNIFICANT CHANGE UP
SODIUM SERPL-SCNC: 140 MMOL/L — SIGNIFICANT CHANGE UP (ref 135–145)
SP GR SPEC: 1.01 — SIGNIFICANT CHANGE UP (ref 1.01–1.02)
THC UR QL: NEGATIVE — SIGNIFICANT CHANGE UP
TROPONIN T SERPL-MCNC: <0.01 NG/ML — SIGNIFICANT CHANGE UP (ref 0–0.06)
TSH SERPL-MCNC: 2.32 UIU/ML — SIGNIFICANT CHANGE UP (ref 0.27–4.2)
UROBILINOGEN FLD QL: NEGATIVE MG/DL — SIGNIFICANT CHANGE UP
WBC # BLD: 10.88 K/UL — HIGH (ref 3.8–10.5)
WBC # FLD AUTO: 10.88 K/UL — HIGH (ref 3.8–10.5)
WBC UR QL: SIGNIFICANT CHANGE UP /HPF (ref 0–5)

## 2022-08-09 PROCEDURE — 93010 ELECTROCARDIOGRAM REPORT: CPT

## 2022-08-09 PROCEDURE — 99220: CPT

## 2022-08-09 PROCEDURE — 71045 X-RAY EXAM CHEST 1 VIEW: CPT | Mod: 26

## 2022-08-09 PROCEDURE — 99283 EMERGENCY DEPT VISIT LOW MDM: CPT

## 2022-08-09 PROCEDURE — 70450 CT HEAD/BRAIN W/O DYE: CPT | Mod: 26,MD

## 2022-08-09 PROCEDURE — 90792 PSYCH DIAG EVAL W/MED SRVCS: CPT

## 2022-08-09 RX ORDER — LEVOTHYROXINE SODIUM 125 MCG
137 TABLET ORAL DAILY
Refills: 0 | Status: DISCONTINUED | OUTPATIENT
Start: 2022-08-09 | End: 2022-08-14

## 2022-08-09 RX ORDER — AZITHROMYCIN 500 MG/1
500 TABLET, FILM COATED ORAL ONCE
Refills: 0 | Status: DISCONTINUED | OUTPATIENT
Start: 2022-08-09 | End: 2022-08-09

## 2022-08-09 RX ORDER — SODIUM CHLORIDE 9 MG/ML
1000 INJECTION, SOLUTION INTRAVENOUS
Refills: 0 | Status: DISCONTINUED | OUTPATIENT
Start: 2022-08-09 | End: 2022-08-09

## 2022-08-09 RX ORDER — CEFTRIAXONE 500 MG/1
1000 INJECTION, POWDER, FOR SOLUTION INTRAMUSCULAR; INTRAVENOUS ONCE
Refills: 0 | Status: DISCONTINUED | OUTPATIENT
Start: 2022-08-09 | End: 2022-08-09

## 2022-08-09 RX ORDER — SODIUM CHLORIDE 9 MG/ML
1000 INJECTION INTRAMUSCULAR; INTRAVENOUS; SUBCUTANEOUS ONCE
Refills: 0 | Status: COMPLETED | OUTPATIENT
Start: 2022-08-09 | End: 2022-08-09

## 2022-08-09 RX ORDER — ACETAMINOPHEN 500 MG
650 TABLET ORAL ONCE
Refills: 0 | Status: COMPLETED | OUTPATIENT
Start: 2022-08-09 | End: 2022-08-09

## 2022-08-09 RX ORDER — MIRTAZAPINE 45 MG/1
30 TABLET, ORALLY DISINTEGRATING ORAL AT BEDTIME
Refills: 0 | Status: DISCONTINUED | OUTPATIENT
Start: 2022-08-09 | End: 2022-08-14

## 2022-08-09 RX ORDER — HALOPERIDOL DECANOATE 100 MG/ML
1 INJECTION INTRAMUSCULAR EVERY 6 HOURS
Refills: 0 | Status: DISCONTINUED | OUTPATIENT
Start: 2022-08-09 | End: 2022-08-14

## 2022-08-09 RX ADMIN — Medication 650 MILLIGRAM(S): at 15:39

## 2022-08-09 RX ADMIN — SODIUM CHLORIDE 1000 MILLILITER(S): 9 INJECTION INTRAMUSCULAR; INTRAVENOUS; SUBCUTANEOUS at 17:34

## 2022-08-09 RX ADMIN — SODIUM CHLORIDE 1000 MILLILITER(S): 9 INJECTION INTRAMUSCULAR; INTRAVENOUS; SUBCUTANEOUS at 14:00
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
## 2022-08-09 NOTE — ED PROVIDER NOTE - PHYSICAL EXAMINATION
Limited exam due to pt presentation.     General: Patient is in no distress and sitting comfortably on hospital bed. Pt then appeared saddened and reached for hands of MS4.   Cardiac: Regular rate, with no murmurs or rubs appreciated.  Pulm: Clear to auscultation bilaterally, with no crackles, rhonchi, or wheezes appreciated.  Abd:  Soft nontender, nondistended, abdomen. No guarding or rebound tenderness.  Skin: Skin is warm and dry with mild excoriations noted on the lower extremities, more pronounced on the pt's LLE.  Ext: mild bilateral edema.   Neuro: Limited exam. AAOx2 to person and place Limited exam due to pt presentation.     General: Patient is in no distress and sitting comfortably on hospital bed. Pt then appeared saddened and reached for hands of MS4.   Cardiac: Grossly tachycardic, with no murmurs or rubs appreciated.  Pulm: Clear to auscultation bilaterally, with no crackles, rhonchi, or wheezes appreciated.  Abd: Soft nontender, nondistended, abdomen. No guarding or rebound tenderness.  Skin: Skin is warm and dry with mild excoriations noted on the lower extremities, more pronounced on the pt's LLE.  Ext: mild bilateral edema.   Neuro: Limited exam. AAOx2 to person and place

## 2022-08-09 NOTE — ED CDU PROVIDER INITIAL DAY NOTE - OBJECTIVE STATEMENT
78 y/o Female w/ PMH of MDD and a Thyroid pathology (unclear) BIBA for bizarre behavior. Pt is adamant that she get a  for Divehi from [place].  #[id number]. Pt states that she is here to see her Thyroid specialist, Dr. Martinez. Pt is able to state that she is in no pain, does not smoke or use alcohol, and takes a medication for her thyroid. During the interview, the pt began to appear worried and grabbed MS4's hands. She stated that a boy with schizophrenia was "going to do damage". She was fearful but reassured for her safety here in the ED. The MD was able to call daughter at: [phone] who states that the pt has dementia and recently moved from [place] to [place] to live with her, her partner, and kids. Since the move, the pt has been exhibiting bizarre behavior; one example was that a tree branch fell outside the house, per daughter. The daughter states that the pt believed that the branch was a person trying to kill them.

## 2022-08-09 NOTE — ED BEHAVIORAL HEALTH ASSESSMENT NOTE - DESCRIPTION
Patient is , currently living with daughter, moved from [place] in 2017 to [place] Patient presents hyperverbal, with labile mood (from euphoric to tearful), paranoia, denies any S/H I/I/P. Patient has not been violent or aggressive. Medical work up has been unremarkable.      ICU Vital Signs Last 24 Hrs  T(C): 37.1 (09 Aug 2022 17:32), Max: 37.8 (09 Aug 2022 14:11)  T(F): 98.8 (09 Aug 2022 17:32), Max: 100 (09 Aug 2022 14:11)  HR: 83 (09 Aug 2022 17:32) (83 - 114)  BP: 145/77 (09 Aug 2022 17:32) (145/77 - 182/97)  BP(mean): --  ABP: --  ABP(mean): --  RR: 17 (09 Aug 2022 17:32) (17 - 22)  SpO2: 96% (09 Aug 2022 17:32) (94% - 99%)    O2 Parameters below as of 09 Aug 2022 17:32  Patient On (Oxygen Delivery Method): room air hypothyroidism, HCL, Diverticulitis, h/o appendicitis

## 2022-08-09 NOTE — ED ADULT NURSE NOTE - ED STAT RN HANDOFF DETAILS
Report given to Irina PILLAI RN. Pt ambulated with steady gait to  with ROSALIND Gupta and RN. Pt made aware of plan of care and verbalized understanding.

## 2022-08-09 NOTE — ED PROVIDER NOTE - PROGRESS NOTE DETAILS
Pt seen at bedside and appears to be in no distress. Pt cooperative with interview and exam. Vitals still tachy at 103. PE unchanged. Will continue to monitor at this time, pending CT.     -Roel Hendrix MS4 no infectious source found. well appearing. vitals stable. patient TSH noted, medically cleared for inpatient psych Spoke to daughter for PM meds. Daughter says no PM meds today, Meds include:    -Levothyroxine 137 micrograms qd in the AM.  -Atorvastatin 40 mg qd- patient has not been taking this medication regularly at home.   15 micro grams Mirtazapine before bed PRN --doesn't take frequently  -7.5 mg qd PRN not needed - Meloxicam

## 2022-08-09 NOTE — ED PROVIDER NOTE - CLINICAL SUMMARY MEDICAL DECISION MAKING FREE TEXT BOX
The patient is a 76 y/o Female w/ PMH of MDD and a Thyroid pathology (unclear) BIBA for bizarre behavior. Vitals significant for , /86, and rectal temp 100.4 F. PE significant for b/l LE edema and excoriations. Differential diagnosis includes, but is not limited to, Systemic infection, Psychiatric pathology, hyperthyroid and thyroid storm, and less likely trauma. Labs, meds, and imaging ordered.    -Roel Hendrix, MS4

## 2022-08-09 NOTE — ED PROVIDER NOTE - NS ED ROS FT
Limited due to pt presentation:    Pulm: No shortness of breath.  Cardio: No chest pain.  GI:  No abdominal pain  : No changes to urinary pattern.

## 2022-08-09 NOTE — ED ADULT NURSE NOTE - CHIEF COMPLAINT
The patient is a 77y Female complaining of bizarre behavior. Patient is  77y Female brought by family for bizarre behavior.

## 2022-08-09 NOTE — ED CDU PROVIDER INITIAL DAY NOTE - PROGRESS NOTE DETAILS
no infectious source found. well appearing. vitals stable. patient TSH noted, medically cleared for inpatient psych

## 2022-08-09 NOTE — ED CDU PROVIDER INITIAL DAY NOTE - MEDICAL DECISION MAKING DETAILS
76 y/o Female w/ PMH of MDD and a Thyroid pathology (unclear) BIBA for bizarre behavior. Kuwaiti  #[id number]. Pt states that she is here to see her Thyroid specialist, Dr. Martinez. also with bilateral leg swelling and itchy rash. Disorganized speech. Denies any si/hi. Denies cp, sob, abd pain, fever, weakness. D/w with daughter Arthur daughter at: [phone] who states patient recently moved in with her and last month has had intermittent bizarre behavior. unsure if dementia or psychiatric. Unsure if she is compliant with meds. Reports recently had thought that there was intruder in house that was threatening her family but daughter reports was just a fallen branch outside.   Ap - well appearing, nonfocal neuro exam. nonmeningeal. very well appearing. minimal swelling. disorganized speech. cleared medically for inpatient psych treatment
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
## 2022-08-09 NOTE — ED PROVIDER NOTE - NSICDXPASTMEDICALHX_GEN_ALL_CORE_FT
PAST MEDICAL HISTORY:  Dementia per daughter    History of thyroid disorder per pt; unknown which disorder

## 2022-08-09 NOTE — ED PROVIDER NOTE - ATTENDING CONTRIBUTION TO CARE
76 y/o Female w/ PMH of MDD and a Thyroid pathology (unclear) BIBA for bizarre behavior. Grenadian  #[id number]. Pt states that she is here to see her Thyroid specialist, Dr. Maritnez. also with bilateral leg swelling and itchy rash. Disorganized speech. Denies any si/hi. Denies cp, sob, abd pain, fever, weakness. D/w with daughter Arthur daughter at: [phone] who states patient recently moved in with her and last month has had intermittent bizarre behavior. unsure if dementia or psychiatric. Unsure if she is compliant with meds. Reports recently had thought that there was intruder in house that was threatening her family but daughter reports was just a fallen branch outside.   Ap - well appearing, nonfocal neuro exam. nonmeningeal. very well appearing. minimal swelling. disorganized speech. will do medical evaluation for sepsis vs. intracranial pathology. will need psych eval. 1:1. reassess

## 2022-08-09 NOTE — ED BEHAVIORAL HEALTH ASSESSMENT NOTE - RISK ASSESSMENT
Low Acute Suicide Risk Moderate to high given manic sx's, paranoia, impulsivity, poor insight/judgment.  Patient has denies any S/H I/I/P, has no h/o suicide attempts, daughter appears to be good support.

## 2022-08-09 NOTE — ED ADULT NURSE NOTE - INTERVENTIONS DEFINITIONS
[place] to call system/Instruct patient to call for assistance/Stretcher in lowest position, wheels locked, appropriate side rails in place/Provide visual cue, wrist band, yellow gown, etc./Monitor gait and stability/Review medications for side effects contributing to fall risk/Reinforce activity limits and safety measures with patient and family/Provide visual clues: red socks

## 2022-08-09 NOTE — ED CDU PROVIDER INITIAL DAY NOTE - PHYSICAL EXAMINATION
Limited exam due to pt presentation.     General: Patient is in no distress and sitting comfortably on hospital bed. Pt then appeared saddened and reached for hands of MS4.   Cardiac: Grossly tachycardic, with no murmurs or rubs appreciated.  Pulm: Clear to auscultation bilaterally, with no crackles, rhonchi, or wheezes appreciated.  Abd: Soft nontender, nondistended, abdomen. No guarding or rebound tenderness.  Skin: Skin is warm and dry with mild excoriations noted on the lower extremities, more pronounced on the pt's LLE.  Ext: mild bilateral edema.   Neuro: Limited exam. AAOx2 to person and place

## 2022-08-09 NOTE — ED ADULT NURSE NOTE - HPI (INCLUDE ILLNESS QUALITY, SEVERITY, DURATION, TIMING, CONTEXT, MODIFYING FACTORS, ASSOCIATED SIGNS AND SYMPTOMS)
Received patient in yellow gown, alert and responsive, patient was able to locate where she is, able to state her name and her age. Patient stated she lives with daughter but people are trying to kill them. Patient stated she is very scare where she lives. Patient was unable to elaborate about those people. Patient stated having osteoporosis from menaupose, thyroid problem and on medication. Patient denied insomnia and report good appetite. Patient denied sadness, denied hallucination but repeated many time people are trying to kill her. Patient stated she is from [facility] but she lives in [facility] a few years and learned Divehi there. Patient understand Divehi and speak Portugese. Patient is very pleasant, with disorganized thought and labile. Patient was oriented to the unit, safety maintained.

## 2022-08-09 NOTE — ED ADULT NURSE NOTE - OBJECTIVE STATEMENT
Pt received A&Ox4 however speaking erratically bouncing from one topic to another. As per daughter, pt has been having hallucinations and thought a tree brach was an invader trying to kill her family. Pt denies any SI/HI. Pt in yellow gown with belongings secured. One to one at bedside for risk of elopement and patient safety. Respirations even & unlabored. NAD present. Pt made aware of plan of care and verbalized understanding. Received patient in yellow gown, wanconnie by security, alert and responsive, patient was able to locate where she is, able to state her name and her age. Patient stated she lives with daughter but people are trying to kill them. Patient stated she is very scare where she lives. Patient was unable to elaborate about those people. Patient stated having osteoporosis from menaupose, thyroid problem and on medication. Patient denied insomnia and report good appetite. Patient denied sadness, denied hallucination but repeated many time people are trying to kill her. Patient stated she is from [facility] but she lives in [facility] a few years and learned Malawian there. Patient understand Malawian and speak Portugese. Patient is very pleasant, with disorganized thought and labile. Patient was oriented to the unit, safety maintained.

## 2022-08-09 NOTE — ED BEHAVIORAL HEALTH ASSESSMENT NOTE - OTHER
Yemeni. Patient declined  services stating that she preferred to speak w/ writer in [place]i which she understands well. Patient did live in [facility] for many years.  Daughter reported to writer that patient understands Kuwaiti well. looking for area hospitals [facility] [phone]

## 2022-08-09 NOTE — ED BEHAVIORAL HEALTH ASSESSMENT NOTE - SUMMARY
Patient is a 77  year old, male; domiciled with daughter, daughter's 3 children, daughter bf and bf's son; she is , with one daughter who is still alive (other passed away);  primarily portugues, understands Norwegian, born in [place], lived in [facility] from [place] until came [place], with  past psychiatric history of depressive episodes with at least two prior psychiatric hospitalizations at [facility] related to agitation and paranoia;  no known suicide attempts; no known history of violence or arrests; no active substance abuse or known history of complicated withdrawal; PMH of thyroid problems, HCL, diverticulitis ; brought in by EMS; called by daughter; for increasingly bizarre and agitated behavior.   Patient presenting with symptoms of chen with psychotic features, reports persecutory and paranoid delusions.  Patient has been taking thyroid medications which may be contributing to recent decompensation.  Her medical work up which included TSH, CT of head and UA were unremarkable. Patient is AOx3 on interview and passed mini cog screeening.  Patient with  psychotic disorder unspecified r/o Bipolar disorder currently manic w/ psychotic features r/o atypical dementia with psychosis r/o psychosis due to C.   She would benefit from psychiatric hospitalization for safety and management of impulses.

## 2022-08-09 NOTE — ED ADULT TRIAGE NOTE - CHIEF COMPLAINT QUOTE
Pt BIBA from home, daughter called EMS for bizarre behavior, pt non compliant with medications as per family, hx of depression and thyroid, pt wanders to other peoples homes, family wants pt to get help

## 2022-08-09 NOTE — ED BEHAVIORAL HEALTH ASSESSMENT NOTE - OTHER PAST PSYCHIATRIC HISTORY (INCLUDE DETAILS REGARDING ONSET, COURSE OF ILLNESS, INPATIENT/OUTPATIENT TREATMENT)
h/o depressive episodes since teenage years.  She does have at least two prior hospitalizations in [place] (around [place]) for agitation and paranoia.  Has h/o outpatient treatment with psychiatrist (last in 2017).  As per daughter, not formally dx with bipolar disorder, no known suicide attempts

## 2022-08-09 NOTE — ED PROVIDER NOTE - OBJECTIVE STATEMENT
The patient is a 76 y/o Female w/ PMH of MDD and a Thyroid pathology (unclear) BIBA for bizarre behavior. Pt is adamant that she get a  for [place] from [place].  #[id number]. Pt states that she is here to see her Thyroid specialist, Dr. Martinez. Pt is able to state that she is in no pain, does not smoke or use alcohol, and takes a medication for her thyroid. During the interview, the pt began to appear worried and grabbed MS4's hands. She stated that a boy with schizophrenia was "going to do damage". She was fearful but reassured for her safety here in the ED. The MD was able to call daughter at: [phone] who states that the pt has dementia and recently moved from [place] to [place] to live with her, her partner, and kids. Since the move, the pt has been exhibiting bizarre behavior; one example was that a tree branch fell outside the house, per daughter. The daughter states that the pt believed that the branch was a person trying to kill them.

## 2022-08-10 ENCOUNTER — APPOINTMENT (OUTPATIENT)
Dept: RHEUMATOLOGY | Facility: CLINIC | Age: 77
End: 2022-08-10

## 2022-08-10 LAB
CULTURE RESULTS: SIGNIFICANT CHANGE UP
SPECIMEN SOURCE: SIGNIFICANT CHANGE UP

## 2022-08-10 PROCEDURE — 99226: CPT

## 2022-08-10 RX ORDER — ASPIRIN/CALCIUM CARB/MAGNESIUM 324 MG
81 TABLET ORAL DAILY
Refills: 0 | Status: DISCONTINUED | OUTPATIENT
Start: 2022-08-10 | End: 2022-08-14

## 2022-08-10 RX ADMIN — Medication 81 MILLIGRAM(S): at 14:07

## 2022-08-11 PROCEDURE — 99226: CPT

## 2022-08-11 PROCEDURE — 99213 OFFICE O/P EST LOW 20 MIN: CPT

## 2022-08-11 RX ORDER — HALOPERIDOL DECANOATE 100 MG/ML
2 INJECTION INTRAMUSCULAR
Refills: 0 | Status: DISCONTINUED | OUTPATIENT
Start: 2022-08-11 | End: 2022-08-14

## 2022-08-11 RX ADMIN — Medication 137 MICROGRAM(S): at 05:28

## 2022-08-11 RX ADMIN — Medication 81 MILLIGRAM(S): at 12:29

## 2022-08-11 NOTE — ED BEHAVIORAL HEALTH NOTE - BEHAVIORAL HEALTH NOTE
PROGRESS NOTE: 22 @ 09:21  	  • Reason for Ongoing Consultation: Continued psychotic behavior     ID: 77yyo Female with HEALTH ISSUES - PROBLEM Dx:  Psychosis, unspecified psychosis type            INTERVAL DATA:   • Interval Chief Complaint: " I want to leave to see my specialist"   • Interval History: 77 year old Maori-speaking female with undetermined PPH seen at bedside this morning laying in bed.  Pt stating that she wants to leave hospital and go see her thyroid specialist in [place]. She attributes her current symptoms to her thyroid disorder and states " I don't need to be in the [facility] hospital". Pt stating that Garry Logan took her money away. Pt seen this morning pacing the unit, mumbling and reading hallway signs aloud to self. Pt notable agitated and speaking loudly throughout the interview.  No overnight events or IM injections since last encounter.   REVIEW OF SYSTEMS:   • Constitutional Symptoms	No complaints  • Eyes	No complaints  • Ears / Nose / Throat / Mouth	No complaints  • Cardiovascular	No complaints  • Respiratory	No complaints  • Gastrointestinal	No complaints  • Genitourinary	No complaints  • Musculoskeletal	No complaints  • Skin	No complaints  • Neurological	No complaints  • Psychiatric (see HPI)	See HPI  • Endocrine	No complaints  • Hematologic / Lymphatic	No complaints  • Allergic / Immunologic	No complaints    REVIEW OF VITALS/LABS/IMAGING/INVESTIGATIONS:   • Vital signs reviewed: Yes  • Vital Signs:	    T(C): 36.9 (22 @ 07:37), Max: 37.1 (08-10-22 @ 15:25)  HR: 79 (22 @ 07:37) (73 - 79)  BP: 138/84 (22 @ 07:37) (119/75 - 152/81)  RR: 19 (22 @ 07:37) (18 - 19)  SpO2: 99% (22 @ 07:37) (96% - 99%)    • Available labs reviewed: Yes  • Available Lab Results:                           12.1   10.88 )-----------( 322      ( 09 Aug 2022 13:50 )             36.5         140  |  105  |  24.8<H>  ----------------------------<  121<H>  4.5   |  21.0<L>  |  0.86    Ca    9.2      09 Aug 2022 13:50    TPro  7.7  /  Alb  4.4  /  TBili  0.4  /  DBili  x   /  AST  25  /  ALT  22  /  AlkPhos  79      LIVER FUNCTIONS - ( 09 Aug 2022 13:50 )  Alb: 4.4 g/dL / Pro: 7.7 g/dL / ALK PHOS: 79 U/L / ALT: 22 U/L / AST: 25 U/L / GGT: x             Urinalysis Basic - ( 09 Aug 2022 14:45 )    Color: Yellow / Appearance: Clear / S.010 / pH: x  Gluc: x / Ketone: Negative  / Bili: Negative / Urobili: Negative mg/dL   Blood: x / Protein: Negative / Nitrite: Negative   Leuk Esterase: Negative / RBC: 0-2 /HPF / WBC 0-2 /HPF   Sq Epi: x / Non Sq Epi: Occasional / Bacteria: Occasional          MEDICATIONS:      PRN Medications:  • PRN Medications since last evaluation none  • PRN Details	    Current Medications:   aspirin  chewable 81 milliGRAM(s) Oral daily  haloperidol     Tablet 2 milliGRAM(s) Oral two times a day  haloperidol    Injectable 1 milliGRAM(s) IntraMuscular every 6 hours PRN  levothyroxine 137 MICROGram(s) Oral daily  LORazepam   Injectable 0.5 milliGRAM(s) IntraMuscular every 6 hours PRN  mirtazapine 30 milliGRAM(s) Oral at bedtime     Medication Side Effects:  • Medication Side Effects or Adverse Reactions (new or ongoing)	None known    MENTAL STATUS EXAM:   • Level of Consciousness	Alert  • General Appearance	Well developed  • Body Habitus	Well nourished  • Hygiene	Good  • Grooming	Good  • Behavior	Cooperative  • Eye Contact	Good  • Relatedness	Good  • Impulse Control	Normal  • Muscle Tone / Strength	Normal muscle tone/strength  • Abnormal Movements	No abnormal movements  • Gait / Station	Normal gait / station  • Speech Volume	Loud   • Speech Rate	Normal  • Speech Spontaneity	Normal  • Speech Articulation	Normal  • Mood	Normal  • Affect Quality	Irritated, angry   • Affect Range	Labile, happy/laughable to tearful   • Affect Congruence	Non-congruent  • Thought Process	Linear  • Thought Associations	Normal  • Thought Content	Delusions - trump stole her money, preoccupations - thyroid condition   • Perceptions	No abnormalities  • Oriented to Time	Yes  • Oriented to Place	Yes  • Oriented to Situation	Yes  • Oriented to Person	Yes  • Attention / Concentration	Normal  • Estimated Intelligence	Average  • Recent Memory	Normal  • Remote Memory	Normal  • Fund of Knowledge	Normal  • Language	No abnormalities noted, Maori speaking   • Judgment (regarding everyday events) poor  • Insight (regarding psychiatric illness)	poor    SUICIDALITY:   • Suicidality (Interval)	none known    HOMICIDALITY/AGGRESSION:   • Homicidality/Aggression	none known    DIAGNOSIS DSM-V:    Psychiatric Diagnosis (Corresponds to DSM-IV Axis I, II):   HEALTH ISSUES - PROBLEM Dx:  Psychosis, unspecified psychosis type             Medical Diagnosis (Corresponds to DSM-IV Axis III): none  • Axis III	      ASSESSMENT OF CURRENT CONDITION:   Summary (include case differential, formulation and patient response to therapy): 77 year old female with PPH of 2 prior hospitalizations for erratic behavior seen today at bedside for continued psychotic behavior x 3 days. Pt preoccupied with thyroid condition, she attributes all her symptoms to this and states she wants to leave and see her specialist. Noted delusions about trump stealing her money. Pt affect labile throughout interview. Pt made aware of plan to transfer her to in-patient psychiatric facility, she continued to state she does not need to be in the hospital.     Risk Assessment (consider static vs modifiable risk factors and protective factors; comment on level of risk for dangerous behavior): RF: history of hospitalization for psychotic behavior, poor insight, agitated   PF: supportive family relations, no history of violence    PLAN Explore in patient psychiatric options. Continue medications. Assess thyroid function labs. PROGRESS NOTE: 22 @ 09:21  	  • Reason for Ongoing Consultation: Continued psychotic behavior     ID: [id number] Female with HEALTH ISSUES - PROBLEM Dx:  Psychosis, unspecified psychosis type            INTERVAL DATA:   • Interval Chief Complaint: " I want to leave to see my specialist"   • Interval History: 77 year old Mongolian-speaking female with undetermined PPH seen at bedside this morning laying in bed.  Pt stating that she wants to leave hospital and go see her thyroid specialist in [place]. She attributes her current symptoms to her thyroid disorder and states " I don't need to be in the [facility] hospital". Pt stating that Garry Logan took her money away. Pt seen this morning pacing the unit, mumbling and reading hallway signs aloud to self. Pt notable agitated and speaking loudly throughout the interview.  No overnight events or IM injections since last encounter.   REVIEW OF SYSTEMS:   • Constitutional Symptoms	No complaints  • Eyes	No complaints  • Ears / Nose / Throat / Mouth	No complaints  • Cardiovascular	No complaints  • Respiratory	No complaints  • Gastrointestinal	No complaints  • Genitourinary	No complaints  • Musculoskeletal	No complaints  • Skin	No complaints  • Neurological	No complaints  • Psychiatric (see HPI)	See HPI  • Endocrine	No complaints  • Hematologic / Lymphatic	No complaints  • Allergic / Immunologic	No complaints    REVIEW OF VITALS/LABS/IMAGING/INVESTIGATIONS:   • Vital signs reviewed: Yes  • Vital Signs:	    T(C): 36.9 (22 @ 07:37), Max: 37.1 (08-10-22 @ 15:25)  HR: 79 (22 @ 07:37) (73 - 79)  BP: 138/84 (22 @ 07:37) (119/75 - 152/81)  RR: 19 (22 @ 07:37) (18 - 19)  SpO2: 99% (22 @ 07:37) (96% - 99%)    • Available labs reviewed: Yes  • Available Lab Results:                           12.1   10.88 )-----------( 322      ( 09 Aug 2022 13:50 )             36.5         140  |  105  |  24.8<H>  ----------------------------<  121<H>  4.5   |  21.0<L>  |  0.86    Ca    9.2      09 Aug 2022 13:50    TPro  7.7  /  Alb  4.4  /  TBili  0.4  /  DBili  x   /  AST  25  /  ALT  22  /  AlkPhos  79      LIVER FUNCTIONS - ( 09 Aug 2022 13:50 )  Alb: 4.4 g/dL / Pro: 7.7 g/dL / ALK PHOS: 79 U/L / ALT: 22 U/L / AST: 25 U/L / GGT: x             Urinalysis Basic - ( 09 Aug 2022 14:45 )    Color: Yellow / Appearance: Clear / S.010 / pH: x  Gluc: x / Ketone: Negative  / Bili: Negative / Urobili: Negative mg/dL   Blood: x / Protein: Negative / Nitrite: Negative   Leuk Esterase: Negative / RBC: 0-2 /HPF / WBC 0-2 /HPF   Sq Epi: x / Non Sq Epi: Occasional / Bacteria: Occasional          MEDICATIONS:      PRN Medications:  • PRN Medications since last evaluation none  • PRN Details	    Current Medications:   aspirin  chewable 81 milliGRAM(s) Oral daily  haloperidol     Tablet 2 milliGRAM(s) Oral two times a day  haloperidol    Injectable 1 milliGRAM(s) IntraMuscular every 6 hours PRN  levothyroxine 137 MICROGram(s) Oral daily  LORazepam   Injectable 0.5 milliGRAM(s) IntraMuscular every 6 hours PRN  mirtazapine 30 milliGRAM(s) Oral at bedtime     Medication Side Effects:  • Medication Side Effects or Adverse Reactions (new or ongoing)	None known    MENTAL STATUS EXAM:   • Level of Consciousness	Alert  • General Appearance	Well developed  • Body Habitus	Well nourished  • Hygiene	Good  • Grooming	Good  • Behavior	Cooperative  • Eye Contact	Good  • Relatedness	Good  • Impulse Control	Normal  • Muscle Tone / Strength	Normal muscle tone/strength  • Abnormal Movements	No abnormal movements  • Gait / Station	Normal gait / station  • Speech Volume	Loud   • Speech Rate	Normal  • Speech Spontaneity	Normal  • Speech Articulation	Normal  • Mood	Normal  • Affect Quality	Irritated, angry   • Affect Range	Labile, happy/laughable to tearful   • Affect Congruence	Non-congruent  • Thought Process	Linear  • Thought Associations	Normal  • Thought Content	Delusions - trump stole her money, preoccupations - thyroid condition   • Perceptions	No abnormalities  • Oriented to Time	Yes  • Oriented to Place	Yes  • Oriented to Situation	Yes  • Oriented to Person	Yes  • Attention / Concentration	Normal  • Estimated Intelligence	Average  • Recent Memory	Normal  • Remote Memory	Normal  • Fund of Knowledge	Normal  • Language	No abnormalities noted, Mongolian speaking   • Judgment (regarding everyday events) poor  • Insight (regarding psychiatric illness)	poor    SUICIDALITY:   • Suicidality (Interval)	none known    HOMICIDALITY/AGGRESSION:   • Homicidality/Aggression	none known    DIAGNOSIS DSM-V:    Psychiatric Diagnosis (Corresponds to DSM-IV Axis I, II):   HEALTH ISSUES - PROBLEM Dx:  Psychosis, unspecified psychosis type             Medical Diagnosis (Corresponds to DSM-IV Axis III): none  • Axis III	      ASSESSMENT OF CURRENT CONDITION:   Summary (include case differential, formulation and patient response to therapy): 77 year old female with PPH of 2 prior hospitalizations for erratic behavior seen today at bedside for continued psychotic behavior x 3 days. Pt preoccupied with thyroid condition, she attributes all her symptoms to this and states she wants to leave and see her specialist. Noted delusions about trump stealing her money. Pt affect labile throughout interview. Pt made aware of plan to transfer her to in-patient psychiatric facility, she continued to state she does not need to be in the hospital.     Risk Assessment (consider static vs modifiable risk factors and protective factors; comment on level of risk for dangerous behavior): RF: history of hospitalization for psychotic behavior, poor insight, agitated   PF: supportive family relations, no history of violence    PLAN Explore in patient psychiatric options. Continue medications. PROGRESS NOTE: 22 @ 09:21  	  • Reason for Ongoing Consultation: Continued psychotic behavior     ID: [id number] Female with HEALTH ISSUES - PROBLEM Dx:  Psychosis, unspecified psychosis type            INTERVAL DATA:   • Interval Chief Complaint: " I want to leave to see my specialist"   • Interval History: 77 year old Frisian-speaking female with undetermined PPH seen at bedside this morning laying in bed.  Pt stating that she wants to leave hospital and go see her thyroid specialist in [place]. She attributes her current symptoms to her thyroid disorder and states " I don't need to be in the [facility] hospital". Pt stating that Garry Logan took her money away. Pt seen this morning pacing the unit, mumbling and reading hallway signs aloud to self. Pt notable agitated and speaking loudly throughout the interview.  No overnight events or IM injections since last encounter.   REVIEW OF SYSTEMS:   • Constitutional Symptoms	No complaints  • Eyes	No complaints  • Ears / Nose / Throat / Mouth	No complaints  • Cardiovascular	No complaints  • Respiratory	No complaints  • Gastrointestinal	No complaints  • Genitourinary	No complaints  • Musculoskeletal	No complaints  • Skin	No complaints  • Neurological	No complaints  • Psychiatric (see HPI)	See HPI  • Endocrine	No complaints  • Hematologic / Lymphatic	No complaints  • Allergic / Immunologic	No complaints    REVIEW OF VITALS/LABS/IMAGING/INVESTIGATIONS:   • Vital signs reviewed: Yes  • Vital Signs:	    T(C): 36.9 (22 @ 07:37), Max: 37.1 (08-10-22 @ 15:25)  HR: 79 (22 @ 07:37) (73 - 79)  BP: 138/84 (22 @ 07:37) (119/75 - 152/81)  RR: 19 (22 @ 07:37) (18 - 19)  SpO2: 99% (22 @ 07:37) (96% - 99%)    • Available labs reviewed: Yes  • Available Lab Results:                           12.1   10.88 )-----------( 322      ( 09 Aug 2022 13:50 )             36.5         140  |  105  |  24.8<H>  ----------------------------<  121<H>  4.5   |  21.0<L>  |  0.86    Ca    9.2      09 Aug 2022 13:50    TPro  7.7  /  Alb  4.4  /  TBili  0.4  /  DBili  x   /  AST  25  /  ALT  22  /  AlkPhos  79      LIVER FUNCTIONS - ( 09 Aug 2022 13:50 )  Alb: 4.4 g/dL / Pro: 7.7 g/dL / ALK PHOS: 79 U/L / ALT: 22 U/L / AST: 25 U/L / GGT: x             Urinalysis Basic - ( 09 Aug 2022 14:45 )    Color: Yellow / Appearance: Clear / S.010 / pH: x  Gluc: x / Ketone: Negative  / Bili: Negative / Urobili: Negative mg/dL   Blood: x / Protein: Negative / Nitrite: Negative   Leuk Esterase: Negative / RBC: 0-2 /HPF / WBC 0-2 /HPF   Sq Epi: x / Non Sq Epi: Occasional / Bacteria: Occasional          MEDICATIONS:      PRN Medications:  • PRN Medications since last evaluation none  • PRN Details	    Current Medications:   aspirin  chewable 81 milliGRAM(s) Oral daily  haloperidol     Tablet 2 milliGRAM(s) Oral two times a day  haloperidol    Injectable 1 milliGRAM(s) IntraMuscular every 6 hours PRN  levothyroxine 137 MICROGram(s) Oral daily  LORazepam   Injectable 0.5 milliGRAM(s) IntraMuscular every 6 hours PRN  mirtazapine 30 milliGRAM(s) Oral at bedtime     Medication Side Effects:  • Medication Side Effects or Adverse Reactions (new or ongoing)	None known    MENTAL STATUS EXAM:   • Level of Consciousness	Alert  • General Appearance	Well developed  • Body Habitus	Well nourished  • Hygiene	Good  • Grooming	Good  • Behavior	Cooperative  • Eye Contact	Good  • Relatedness	Good  • Impulse Control	Normal  • Muscle Tone / Strength	Normal muscle tone/strength  • Abnormal Movements	No abnormal movements  • Gait / Station	Normal gait / station  • Speech Volume	Loud   • Speech Rate	Normal  • Speech Spontaneity	Normal  • Speech Articulation	Normal  • Mood	angry  • Affect Quality	Irritated, angry   • Affect Range	Labile, happy/laughable to tearful   • Affect Congruence	Non-congruent  • Thought Process	Linear  • Thought Associations	Normal  • Thought Content	Delusions - trump stole her money, preoccupations - thyroid condition   • Perceptions	No abnormalities  • Oriented to Time	Yes  • Oriented to Place	Yes  • Oriented to Situation	Yes  • Oriented to Person	Yes  • Attention / Concentration	Normal  • Estimated Intelligence	Average  • Recent Memory	Normal  • Remote Memory	Normal  • Fund of Knowledge	Normal  • Language	No abnormalities noted, Frisian speaking   • Judgment (regarding everyday events) poor  • Insight (regarding psychiatric illness)	poor    SUICIDALITY:   • Suicidality (Interval)	none known    HOMICIDALITY/AGGRESSION:   • Homicidality/Aggression	none known    DIAGNOSIS DSM-V:    Psychiatric Diagnosis (Corresponds to DSM-IV Axis I, II):   HEALTH ISSUES - PROBLEM Dx:  Psychosis, unspecified psychosis type     Medical Diagnosis (Corresponds to DSM-IV Axis III): none  • Axis III	  hypothyroidism  ASSESSMENT OF CURRENT CONDITION:   Summary (include case differential, formulation and patient response to therapy): 77 year old female with PPH of 2 prior hospitalizations for erratic behavior seen today at bedside for continued psychotic behavior x 3 days. Pt preoccupied with thyroid condition, she attributes all her symptoms to this and states she wants to leave and see her specialist. Noted delusions about Trump stealing her money. Pt affect labile throughout interview. Pt made aware of plan to transfer her to in-patient psychiatric facility, she continued to state she does not need to be in the hospital.     Risk Assessment (consider static vs modifiable risk factors and protective factors; comment on level of risk for dangerous behavior): RF: history of hospitalization for psychotic behavior, poor insight, agitated   PF: supportive family relations, no history of violence    PLAN Explore in patient psychiatric options. Continue medications.

## 2022-08-12 LAB
RAPID RVP RESULT: SIGNIFICANT CHANGE UP
SARS-COV-2 RNA SPEC QL NAA+PROBE: SIGNIFICANT CHANGE UP

## 2022-08-12 PROCEDURE — 99226: CPT

## 2022-08-12 RX ADMIN — Medication 81 MILLIGRAM(S): at 11:40

## 2022-08-12 RX ADMIN — Medication 137 MICROGRAM(S): at 06:45

## 2022-08-12 NOTE — ED CDU PROVIDER SUBSEQUENT DAY NOTE - ST/T WAVE
No ST changes or T wave inversions.

## 2022-08-13 PROCEDURE — 99226: CPT

## 2022-08-13 RX ORDER — ACETAMINOPHEN 500 MG
650 TABLET ORAL EVERY 6 HOURS
Refills: 0 | Status: DISCONTINUED | OUTPATIENT
Start: 2022-08-13 | End: 2022-08-14

## 2022-08-13 RX ADMIN — Medication 650 MILLIGRAM(S): at 00:26

## 2022-08-13 RX ADMIN — Medication 81 MILLIGRAM(S): at 11:48

## 2022-08-13 RX ADMIN — Medication 137 MICROGRAM(S): at 05:58

## 2022-08-13 NOTE — ED ADULT NURSE REASSESSMENT NOTE - NSIMPLEMENTINTERV_GEN_ALL_ED
Implemented All Universal Safety Interventions:  [place] to call system. Call bell, personal items and telephone within reach. Instruct patient to call for assistance. Room bathroom lighting operational. Non-slip footwear when patient is off stretcher. Physically safe environment: no spills, clutter or unnecessary equipment. Stretcher in lowest position, wheels locked, appropriate side rails in place.
Implemented All Universal Safety Interventions:  [place] to call system. Call bell, personal items and telephone within reach. Instruct patient to call for assistance. Room bathroom lighting operational. Non-slip footwear when patient is off stretcher. Physically safe environment: no spills, clutter or unnecessary equipment. Stretcher in lowest position, wheels locked, appropriate side rails in place.

## 2022-08-13 NOTE — ED CDU PROVIDER SUBSEQUENT DAY NOTE - ATTENDING CONTRIBUTION TO CARE
pt remains in  awaiting inpatient psychiatric admission. Pt is medically clear for psychiatric admission.

## 2022-08-14 VITALS
SYSTOLIC BLOOD PRESSURE: 148 MMHG | HEART RATE: 87 BPM | DIASTOLIC BLOOD PRESSURE: 84 MMHG | TEMPERATURE: 98 F | RESPIRATION RATE: 18 BRPM | OXYGEN SATURATION: 98 %

## 2022-08-14 LAB
CULTURE RESULTS: SIGNIFICANT CHANGE UP
CULTURE RESULTS: SIGNIFICANT CHANGE UP
SPECIMEN SOURCE: SIGNIFICANT CHANGE UP
SPECIMEN SOURCE: SIGNIFICANT CHANGE UP

## 2022-08-14 PROCEDURE — 99226: CPT

## 2022-08-14 RX ADMIN — MIRTAZAPINE 30 MILLIGRAM(S): 45 TABLET, ORALLY DISINTEGRATING ORAL at 23:30

## 2022-08-14 RX ADMIN — Medication 650 MILLIGRAM(S): at 06:21

## 2022-08-14 RX ADMIN — Medication 81 MILLIGRAM(S): at 13:44

## 2022-08-14 RX ADMIN — Medication 137 MICROGRAM(S): at 06:18

## 2022-08-14 NOTE — ED ADULT NURSE REASSESSMENT NOTE - NS ED NURSE REASSESS COMMENT FT1
Assumed care of patient at 0710.  Patient resting in bed awake.  Patient affect is bright and pleasant during interaction.  Patient oriented to staff and educated about plan of care in Thai.  No attempts to harm slef or others and safety maintained.
Assumed care of patient at 0715.  Patient awake in her room watching television.  Patient oriented to staff and educated about plan of care in American and verbalized understanding.  No attempts to harm self or others and safety maintained.
Patient ate 100% of her lunch.  Patient resting in bed with no distress.  Safety of patient maintained.
Patient complain of leg pain and shoulder pain, requested tylenol. tylenol given as per MD order, tolerated well, went to bed, patient sleeping now. safety maintained.
Patient in bed sleeping, able to make needs known in Faroese, no complain of pain voiced, safety maintained.
Patient in bed sleeping, no attempt to harm self or other, denied hallucination, safety maintained.
Patient received all PM medications, tolerated well, on the community area watching TV, refused PM medication despite encouragement, daughter called, update given, safety maintained.
Patient refused PM medication. As per daughter patient in non compliant with medication at home as well. MD Psych aware. Patient was offered a meals that she tolerated, patient observed talking to the phone with daughter and get loud occasionally. Patient voiced no complain at this time, safety maintained.
Patient remain non compliant with nursing care, received tylenol overnight for complain of pain tolerated well, remain on involuntary transfer, awaiting fo a bed, all needs attend, safety maintained.
Pt resting comfortably in bed, No acute distress noted at this time safety maintained.  Pt encouraged to report any needs or changes to staff.
Received patient in yellow gown, alert and responsive, patient was able to locate where she is, able to state her name and her age. Patient stated she lives with daughter but people are trying to kill them. Patient stated she is very scare where she lives. Patient was unable to elaborate about those people. Patient stated having osteoporosis from menaupose, thyroid problem and on medication. Patient denied insomnia and report good appetite. Patient denied sadness, denied hallucination but repeated many time people are trying to kill her. Patient stated she is from [facility] but she lives in [facility] a few years and learned Frisian there. Patient understand Frisian and speak Portugese. Patient is very pleasant, with disorganized thought and labile. Patient was oriented to the unit, safety maintained.
Resident refused Synthroid pill despite encouragement and education, stated will take the pill when she goes home. Slept without difficulties, no complain voiced, PO fluids tolerated, remain irritable and anxious, no attempt to harm self and other, safety maintained.
awake ambulatory in unit
psych MD at bedside.
pt awake and alert in her room. pt offers no complaints. pt's appetite good consuming meals 100%. pt requesting and utilizing telephone to talk to her daughter. pt's daughter dropped off clothing and took existing clothing from here with her. pt has made no attempts to harm herself or others.
pt has been sleeping ad self isolating to her room. pt offers no complaints but refused hs medications. pt has made no attempts to harm herself or others.
received report received pt in bed resting quietly
Assumed care of patient at 0720.  Patient awake in her room.  Patient pleasant on interaction.  Patient reports she feels good in Korean.  Patient provided body lotion for dry skin as requested.  No attempts to harm self or others and safety maintained.
Patient received  Synthroid, tolerated well, remain non compliant with medication administration, stated he refused to take any psych medication. Awake in room wathching TV, no complain of pain this shift, patient able to walk without assistance, able to make needs known in Nepali, safety maintained.
Patient in community area watching TV, no complain voiced at this time, safety maintained.
Sleeping intermittent in chair while watching television.  Patient ate 100% of her dinner.  No attempts to harm self or others and safety maintained.
Patient is pleasant on interaction.  No attempts to harm self or others.  No overt delusions expressed during interactions in Tamazight.  Patient social with Tamazight speaking peers.  Patient out of room watching television.  No attempts to harm self or others and safety maintained.
Patient out in the community area, watching TV, remained confused, stating Mosquitos are biting her. Explained to patient no mosquitos around. Patient refused to understand. Portugase speaking patient understand Bulgarian. No attempt to harm self or other, all needs attend, safety maintained.
Patient in bed and sleeping soundly at this time. Awaiting inpatient transfer when bed available. No distress noted.
Patient ate 100% of her dinner.  Mood and behavior stable.  No attempts to harm self or others.  Safety of patient maintained.
Patient ate 100% of her meal at breakfast.  Patient offers no complaints.  No swelling in legs or rash noted.  No attempts to harm self or others. safety of patient maintained.
Patient mood is calm.  Patient intermittently noted talking and laughing to herself.  Patient denies experiencing auditory or visual hallucinations.  Patient ate 100% of her dinner.  Patient refused Haldol as prescribed and Mack Vallecillo NP notified of refusal.  No attempts to harm self ro others and safety maintained.
Patient remains pleasant on interaction.  Dr. Bergeron notified of blood pressure results by BUCKY Carrillo RN.  Patient ate 100% of her lunch.  Patient out of her room watching television in community area.  No attempts to harm self or others and safety maintained.
Patient in the community area, alert and awake, remain on schedule for involuntary transfer, awaiting fo an available bed, meals and PO fluids tolerated, no complain of pain or discomfort voiced, non compliant with medication and nursing care, all needs attend, safety maintained.

## 2022-08-14 NOTE — CHART NOTE - NSCHARTNOTEFT_GEN_A_CORE
SW Note: Plan is to transfer pt for inpt psychiatric care. Currently no beds at [facility]- (Kasie)  and  [facility] ( Josephine) the two viktor units.  is also reporting no beds ( mayco) , [facility] [phone] ( Megha), [facility] [phone], [facility] is closed for transfers , [facility] [phone], [facility] is closed due to a covid outbreak spoke with Rebecca [phone] and YUSUF brandt was left for the  team [phone]  Spoke with Loyd at [facility] and currently she is not sure about bed census due to pts in their facility that need beds. She is aware [facility] has  holds if a bed opens.
SWNote: pt seen by Behavioral MD(Damaso) pt needs transfer 9.37 status. No bed available this evening . SW to follow in the am.
SWNote: sincere Goddard at [facility] MD unable to accept due to lack of medical clearance (rash in legs not addressed) this pm, encouraged to ask MD to f/u and write if pt is medically cleared they may be able to take in the am . worker informed Dr Lopez will f/u accordingly. SW to follow in the am.
Social Work Note: SW following for inpatient psychiatric admission. No beds at [facility], [facility], , [facility]. Clinicals faxed to [facility] for review. SW will continue to follow.
ANNEMARIE Note: Plan is to transfer pt for inpt psychiatric care. Labs completed, covid neg. No beds at [facility] ( viktor unit),  and [facility] is currently not accepting transfers due to bed availability and pts in the ED. [facility] [phone] spoke with Ihsan. Beds available, chart faxed for MD review. SW will follow
ANNEMARIE continues to follow for involuntary inpatient psychiatric admission. Facility options limited due to age. ANNEMARIE spoke with HANNAH Redd at [facility], [phone] who reports no geriatric beds available. ANNEMARIE spoke with Ihsan at [facility], [phone] who reports no geriatric beds available. SW to continue to follow.
SW Note: Plan continues to explore inpt psych transfer. No beds available at [facility] units. Also no beds at [facility], [facility] and . [facility] and [facility] are closed for transfers  this time. SW will follow
SWNote: pt needs transfer 9.37 status /no Josseline bed available at [facility]) or [facility] ([facility]) this evening. SW to follow.

## 2022-08-14 NOTE — ED CDU PROVIDER SUBSEQUENT DAY NOTE - WR ORDER NAME 1
Xray Chest 1 View- PORTABLE-Urgent

## 2022-08-14 NOTE — ED CDU PROVIDER SUBSEQUENT DAY NOTE - MEDICAL DECISION MAKING DETAILS
78 y/o Female w/ PMH of MDD and a Thyroid pathology (unclear) BIBA for bizarre behavior. Stateless  #[id number]. Pt states that she is here to see her Thyroid specialist, Dr. Martinez. also with bilateral leg swelling and itchy rash. Disorganized speech. Denies any si/hi. Denies cp, sob, abd pain, fever, weakness. D/w with daughter Arthur daughter at: [phone] who states patient recently moved in with her and last month has had intermittent bizarre behavior. unsure if dementia or psychiatric. Unsure if she is compliant with meds. Reports recently had thought that there was intruder in house that was threatening her family but daughter reports was just a fallen branch outside.   Ap - well appearing, nonfocal neuro exam. nonmeningeal. very well appearing. minimal swelling. disorganized speech. cleared medically for inpatient psych treatment
76 y/o Female w/ PMH of MDD and a Thyroid pathology (unclear) BIBA for bizarre behavior. Sudanese  #[id number]. Pt states that she is here to see her Thyroid specialist, Dr. Martinez. also with bilateral leg swelling and itchy rash. Disorganized speech. Denies any si/hi. Denies cp, sob, abd pain, fever, weakness. D/w with daughter Arthur daughter at: [phone] who states patient recently moved in with her and last month has had intermittent bizarre behavior. unsure if dementia or psychiatric. Unsure if she is compliant with meds. Reports recently had thought that there was intruder in house that was threatening her family but daughter reports was just a fallen branch outside.   Ap - well appearing, nonfocal neuro exam. nonmeningeal. very well appearing. minimal swelling. disorganized speech. cleared medically for inpatient psych treatment
78 y/o Female w/ PMH of MDD and a Thyroid pathology (unclear) BIBA for bizarre behavior. Cuban  #[id number]. Pt states that she is here to see her Thyroid specialist, Dr. Martinez. also with bilateral leg swelling and itchy rash. Disorganized speech. Denies any si/hi. Denies cp, sob, abd pain, fever, weakness. D/w with daughter Arthur daughter at: [phone] who states patient recently moved in with her and last month has had intermittent bizarre behavior. unsure if dementia or psychiatric. Unsure if she is compliant with meds. Reports recently had thought that there was intruder in house that was threatening her family but daughter reports was just a fallen branch outside.   Ap - well appearing, nonfocal neuro exam. nonmeningeal. very well appearing. minimal swelling. disorganized speech. cleared medically for inpatient psych treatment
76 y/o Female w/ PMH of MDD and a Thyroid pathology (unclear) BIBA for bizarre behavior. Sammarinese  #[id number]. Pt states that she is here to see her Thyroid specialist, Dr. Martinez. also with bilateral leg swelling and itchy rash. Disorganized speech. Denies any si/hi. Denies cp, sob, abd pain, fever, weakness. D/w with daughter Arthur daughter at: [phone] who states patient recently moved in with her and last month has had intermittent bizarre behavior. unsure if dementia or psychiatric. Unsure if she is compliant with meds. Reports recently had thought that there was intruder in house that was threatening her family but daughter reports was just a fallen branch outside.   Ap - well appearing, nonfocal neuro exam. nonmeningeal. very well appearing. minimal swelling. disorganized speech. cleared medically for inpatient psych treatment
76 y/o Female w/ PMH of MDD and a Thyroid pathology (unclear) BIBA for bizarre behavior. Tanzanian  #[id number]. Pt states that she is here to see her Thyroid specialist, Dr. Martinez. also with bilateral leg swelling and itchy rash. Disorganized speech. Denies any si/hi. Denies cp, sob, abd pain, fever, weakness. D/w with daughter Arthur daughter at: [phone] who states patient recently moved in with her and last month has had intermittent bizarre behavior. unsure if dementia or psychiatric. Unsure if she is compliant with meds. Reports recently had thought that there was intruder in house that was threatening her family but daughter reports was just a fallen branch outside.   Ap - well appearing, nonfocal neuro exam. nonmeningeal. very well appearing. minimal swelling. disorganized speech. cleared medically for inpatient psych treatment

## 2022-08-14 NOTE — ED CDU PROVIDER SUBSEQUENT DAY NOTE - PHYSICAL EXAMINATION
Gen: Well appearing in NAD  Head: NC/AT  Neck: trachea midline  Resp:  No distress  Ext: no deformities  Neuro:  A&O appears non focal  Skin:  Warm and dry as visualized
Limited exam due to pt presentation.     General: Patient is in no distress and sitting comfortably on hospital bed. Pt then appeared saddened and reached for hands of MS4.   Cardiac: Grossly tachycardic, with no murmurs or rubs appreciated.  Pulm: Clear to auscultation bilaterally, with no crackles, rhonchi, or wheezes appreciated.  Abd: Soft nontender, nondistended, abdomen. No guarding or rebound tenderness.  Skin: Skin is warm and dry with mild excoriations noted on the lower extremities, more pronounced on the pt's LLE.  Ext: mild bilateral edema.   Neuro: Limited exam. AAOx2 to person and place
Gen: Well appearing in NAD  Head: NC/AT  Neck: trachea midline  Resp:  No distress  Ext: no deformities  Neuro:  A&O appears non focal  Skin:  Warm and dry as visualized

## 2022-08-14 NOTE — ED CDU PROVIDER SUBSEQUENT DAY NOTE - HISTORY
No events overnight. Awaiting bed for admission
Martine CARROLL: no acute events overnight.
Martine CARROLL: no acute events overnight.
no acute events overnight

## 2022-08-14 NOTE — CHART NOTE - NSCHARTNOTESELECT_GEN_ALL_CORE
Event Note
Event Note
SW/Event Note
SW/Event Note
Event Note
Event Note
SW/Event Note
Social Work Note/Event Note

## 2022-08-14 NOTE — ED CDU PROVIDER SUBSEQUENT DAY NOTE - WET READ LAUNCH FT
There are no Wet Read(s) to document.
There is 1 Wet Read(s) to document.

## 2022-08-15 ENCOUNTER — EMERGENCY (EMERGENCY)
Facility: HOSPITAL | Age: 77
LOS: 1 days | Discharge: OTHER INSTITUTION | End: 2022-08-15
Attending: STUDENT IN AN ORGANIZED HEALTH CARE EDUCATION/TRAINING PROGRAM
Payer: COMMERCIAL

## 2022-08-15 VITALS
DIASTOLIC BLOOD PRESSURE: 90 MMHG | HEART RATE: 80 BPM | TEMPERATURE: 98 F | SYSTOLIC BLOOD PRESSURE: 149 MMHG | OXYGEN SATURATION: 100 % | RESPIRATION RATE: 18 BRPM

## 2022-08-15 VITALS — SYSTOLIC BLOOD PRESSURE: 154 MMHG | TEMPERATURE: 97 F | DIASTOLIC BLOOD PRESSURE: 82 MMHG | HEART RATE: 76 BPM

## 2022-08-15 PROBLEM — F03.90 UNSPECIFIED DEMENTIA WITHOUT BEHAVIORAL DISTURBANCE: Chronic | Status: ACTIVE | Noted: 2022-08-09

## 2022-08-15 LAB — SARS-COV-2 RNA SPEC QL NAA+PROBE: SIGNIFICANT CHANGE UP

## 2022-08-15 PROCEDURE — 86703 HIV-1/HIV-2 1 RESULT ANTBDY: CPT

## 2022-08-15 PROCEDURE — 85025 COMPLETE CBC W/AUTO DIFF WBC: CPT

## 2022-08-15 PROCEDURE — U0003: CPT

## 2022-08-15 PROCEDURE — 81001 URINALYSIS AUTO W/SCOPE: CPT

## 2022-08-15 PROCEDURE — 93005 ELECTROCARDIOGRAM TRACING: CPT

## 2022-08-15 PROCEDURE — 36415 COLL VENOUS BLD VENIPUNCTURE: CPT

## 2022-08-15 PROCEDURE — 84443 ASSAY THYROID STIM HORMONE: CPT

## 2022-08-15 PROCEDURE — U0005: CPT

## 2022-08-15 PROCEDURE — 80307 DRUG TEST PRSMV CHEM ANLYZR: CPT

## 2022-08-15 PROCEDURE — 87040 BLOOD CULTURE FOR BACTERIA: CPT

## 2022-08-15 PROCEDURE — 84484 ASSAY OF TROPONIN QUANT: CPT

## 2022-08-15 PROCEDURE — 99217: CPT

## 2022-08-15 PROCEDURE — 83880 ASSAY OF NATRIURETIC PEPTIDE: CPT

## 2022-08-15 PROCEDURE — 87086 URINE CULTURE/COLONY COUNT: CPT

## 2022-08-15 PROCEDURE — L9981: CPT

## 2022-08-15 PROCEDURE — 99284 EMERGENCY DEPT VISIT MOD MDM: CPT | Mod: 25

## 2022-08-15 PROCEDURE — G0378: CPT

## 2022-08-15 PROCEDURE — 0225U NFCT DS DNA&RNA 21 SARSCOV2: CPT

## 2022-08-15 PROCEDURE — 70450 CT HEAD/BRAIN W/O DYE: CPT | Mod: MD

## 2022-08-15 PROCEDURE — 71045 X-RAY EXAM CHEST 1 VIEW: CPT

## 2022-08-15 PROCEDURE — 80053 COMPREHEN METABOLIC PANEL: CPT

## 2022-08-15 RX ORDER — ASPIRIN/CALCIUM CARB/MAGNESIUM 324 MG
81 TABLET ORAL DAILY
Refills: 0 | Status: DISCONTINUED | OUTPATIENT
Start: 2022-08-15 | End: 2022-08-19

## 2022-08-15 RX ORDER — MIRTAZAPINE 45 MG/1
30 TABLET, ORALLY DISINTEGRATING ORAL AT BEDTIME
Refills: 0 | Status: DISCONTINUED | OUTPATIENT
Start: 2022-08-15 | End: 2022-08-19

## 2022-08-15 RX ORDER — HALOPERIDOL DECANOATE 100 MG/ML
1 INJECTION INTRAMUSCULAR EVERY 6 HOURS
Refills: 0 | Status: DISCONTINUED | OUTPATIENT
Start: 2022-08-15 | End: 2022-08-19

## 2022-08-15 RX ORDER — LEVOTHYROXINE SODIUM 125 MCG
137 TABLET ORAL DAILY
Refills: 0 | Status: DISCONTINUED | OUTPATIENT
Start: 2022-08-15 | End: 2022-08-19

## 2022-08-15 RX ORDER — ACETAMINOPHEN 500 MG
650 TABLET ORAL EVERY 6 HOURS
Refills: 0 | Status: DISCONTINUED | OUTPATIENT
Start: 2022-08-15 | End: 2022-08-19

## 2022-08-15 RX ORDER — HALOPERIDOL DECANOATE 100 MG/ML
2 INJECTION INTRAMUSCULAR
Refills: 0 | Status: DISCONTINUED | OUTPATIENT
Start: 2022-08-15 | End: 2022-08-19

## 2022-08-15 RX ADMIN — Medication 137 MICROGRAM(S): at 06:15

## 2022-08-15 RX ADMIN — Medication 81 MILLIGRAM(S): at 17:26

## 2022-08-15 NOTE — ED ADULT NURSE REASSESSMENT NOTE - GENERAL PATIENT STATE
comfortable appearance/cooperative
comfortable appearance/no change observed/resting/sleeping
comfortable appearance/resting/sleeping
comfortable appearance/cooperative
comfortable appearance/no change observed
comfortable appearance/resting/sleeping
comfortable appearance/resting/sleeping
comfortable appearance
comfortable appearance/cooperative

## 2022-08-15 NOTE — ED ADULT NURSE REASSESSMENT NOTE - COMFORT CARE
meal provided/plan of care explained
meal provided/plan of care explained/po fluids offered
ambulated to bathroom
darkened lights
meal provided/plan of care explained
plan of care explained
meal provided/plan of care explained
meal provided/plan of care explained
plan of care explained
plan of care explained

## 2022-08-15 NOTE — ED ADULT NURSE REASSESSMENT NOTE - NS ED NURSE REASSESS COMMENT FT1
Assumed care of patient.  Pt alert and cooperative e patient Fijian speaking but able to make needs known.  Pt has syed informed by DR. Lin and prior nurse that she is going to be transported to [place] for further treatment.  Will monitor and chart changes and maintain safe environment.  Awaiting on ambulance to arrive.
pt is awake and alert with good appetite. pt has made no attempts to harm self or others. pt was compliant with covid swab.
Patient received AM medication tolerated well, continue to refuse Haldol. Patient slept well,  no complain, no attempt to harm self or other, safety maintained.

## 2022-08-15 NOTE — ED BEHAVIORAL HEALTH NOTE - BEHAVIORAL HEALTH NOTE
PROGRESS NOTE: 08-15-22 @ 09:14  	  • Reason for Ongoing Consultation: continued psychotic behavior    ID: 77yyo Female with HEALTH ISSUES - PROBLEM Dx:  Psychosis, unspecified psychosis type     INTERVAL DATA:   • Interval Chief Complaint: " My problem is my thyroid"  • Interval History: 77 year old female seen today awaiting transfer to inpatient psychiatric facility. Pt continuing to be preoccupied with thyroid condition, stating she does not need psychiatric evaluation. No delusions elicited during interview, no SI/HI ideation, no auditory/visual hallucinations.     REVIEW OF SYSTEMS:   • Constitutional Symptoms	No complaints  • Eyes	No complaints  • Ears / Nose / Throat / Mouth	No complaints  • Cardiovascular	No complaints  • Respiratory	No complaints  • Gastrointestinal	No complaints  • Genitourinary	No complaints  • Musculoskeletal	No complaints  • Skin	No complaints  • Neurological	No complaints  • Psychiatric (see HPI)	See HPI  • Endocrine	No complaints  • Hematologic / Lymphatic	No complaints  • Allergic / Immunologic	No complaints    REVIEW OF VITALS/LABS/IMAGING/INVESTIGATIONS:   • Vital signs reviewed: Yes  • Vital Signs:	    T(C): 36.9 (08-15-22 @ 07:38), Max: 36.9 (08-14-22 @ 15:25)  HR: 82 (08-15-22 @ 07:38) (76 - 88)  BP: 152/91 (08-15-22 @ 07:38) (148/84 - 158/80)  RR: 18 (08-15-22 @ 07:38) (18 - 18)  SpO2: 99% (08-15-22 @ 07:38) (98% - 99%)    • Available labs reviewed: Yes  • Available Lab Results:       MEDICATIONS:      PRN Medications: none   • PRN Medications since last evaluation	  • PRN Details	    Current Medications:   acetaminophen     Tablet .. 650 milliGRAM(s) Oral every 6 hours PRN  aspirin  chewable 81 milliGRAM(s) Oral daily  haloperidol     Tablet 2 milliGRAM(s) Oral two times a day  haloperidol    Injectable 1 milliGRAM(s) IntraMuscular every 6 hours PRN  levothyroxine 137 MICROGram(s) Oral daily  LORazepam   Injectable 0.5 milliGRAM(s) IntraMuscular every 6 hours PRN  mirtazapine 30 milliGRAM(s) Oral at bedtime     Medication Side Effects:  • Medication Side Effects or Adverse Reactions (new or ongoing)	None known    MENTAL STATUS EXAM:   • Level of Consciousness	Alert  • General Appearance	Well developed  • Body Habitus	Well nourished  • Hygiene	Good  • Grooming	Good  • Behavior	Cooperative  • Eye Contact	Good  • Relatedness	Good  • Impulse Control	Normal  • Muscle Tone / Strength	Normal muscle tone/strength  • Abnormal Movements	No abnormal movements  • Gait / Station	Normal gait / station  • Speech Volume	Loud  • Speech Rate	rapid  • Speech Spontaneity	Normal  • Speech Articulation	Normal  • Mood	irritated  • Affect Quality	irritated   • Affect Range	Labile  • Affect Congruence	Congruent  • Thought Process	Linear  • Thought Associations	Normal  • Thought Content	preoccupations  • Perceptions	No abnormalities  • Oriented to Time	Yes  • Oriented to Place	Yes  • Oriented to Situation	Yes  • Oriented to Person	Yes  • Attention / Concentration	Normal  • Estimated Intelligence	Average  • Recent Memory	Normal  • Remote Memory	Normal  • Fund of Knowledge	Normal  • Language	No abnormalities noted  • Judgment (regarding everyday events) poor  • Insight (regarding psychiatric illness)	poor    SUICIDALITY:   • Suicidality (Interval)	none known    HOMICIDALITY/AGGRESSION:   • Homicidality/Aggression	none known    DIAGNOSIS DSM-V:    Psychiatric Diagnosis (Corresponds to DSM-IV Axis I, II):   HEALTH ISSUES - PROBLEM Dx:  Psychosis, unspecified         Medical Diagnosis (Corresponds to DSM-IV Axis III):  • Axis III	  hypothyroidism     ASSESSMENT OF CURRENT CONDITION:   Summary (include case differential, formulation and patient response to therapy): 77 year old female seen at bedside today with continued preoccupations of thyroid condition and poor insight of psychiatric needs. Pt is awaiting in-patient psychiatric transfer. Pt continues to deny need for psychiatric medications/evaluation.     Risk Assessment (consider static vs modifiable risk factors and protective factors; comment on level of risk for dangerous behavior):   RF: easily irritated, psychotic behavior  PF: no previous suicide attempts, no history of aggressive behavior   Moderate Risk     PLAN: Continue to explore in patient transfer options; Continue medications as prescribed. PROGRESS NOTE: 08-15-22 @ 09:14  	  • Reason for Ongoing Consultation: continued psychotic behavior    ID: 77yyo Female with HEALTH ISSUES - PROBLEM Dx:  Psychosis, unspecified psychosis type     INTERVAL DATA:   • Interval Chief Complaint: " My problem is my thyroid"  • Interval History: 77 year old female seen today awaiting transfer to inpatient psychiatric facility. Pt continuing to be preoccupied with thyroid condition, stating she does not need psychiatric evaluation. No delusions elicited during interview, no SI/HI ideation, no auditory/visual hallucinations.     REVIEW OF SYSTEMS:   • Constitutional Symptoms	No complaints  • Eyes	No complaints  • Ears / Nose / Throat / Mouth	No complaints  • Cardiovascular	No complaints  • Respiratory	No complaints  • Gastrointestinal	No complaints  • Genitourinary	No complaints  • Musculoskeletal	No complaints  • Skin	No complaints  • Neurological	No complaints  • Psychiatric (see HPI)	See HPI  • Endocrine	No complaints  • Hematologic / Lymphatic	No complaints  • Allergic / Immunologic	No complaints    REVIEW OF VITALS/LABS/IMAGING/INVESTIGATIONS:   • Vital signs reviewed: Yes  • Vital Signs:	    T(C): 36.9 (08-15-22 @ 07:38), Max: 36.9 (08-14-22 @ 15:25)  HR: 82 (08-15-22 @ 07:38) (76 - 88)  BP: 152/91 (08-15-22 @ 07:38) (148/84 - 158/80)  RR: 18 (08-15-22 @ 07:38) (18 - 18)  SpO2: 99% (08-15-22 @ 07:38) (98% - 99%)    • Available labs reviewed: Yes  • Available Lab Results:       MEDICATIONS:      PRN Medications: none   • PRN Medications since last evaluation	  • PRN Details	    Current Medications:   acetaminophen     Tablet .. 650 milliGRAM(s) Oral every 6 hours PRN  aspirin  chewable 81 milliGRAM(s) Oral daily  haloperidol     Tablet 2 milliGRAM(s) Oral two times a day  haloperidol    Injectable 1 milliGRAM(s) IntraMuscular every 6 hours PRN  levothyroxine 137 MICROGram(s) Oral daily  Lorazepam   Injectable 0.5 milliGRAM(s) IntraMuscular every 6 hours PRN  mirtazapine 30 milliGRAM(s) Oral at bedtime     Medication Side Effects:  • Medication Side Effects or Adverse Reactions (new or ongoing)	None known    MENTAL STATUS EXAM:   • Level of Consciousness	Alert  • General Appearance	Well developed  • Body Habitus	Well nourished  • Hygiene	Good  • Grooming	Good  • Behavior	Cooperative  • Eye Contact	Good  • Relatedness	Good  • Impulse Control	Normal  • Muscle Tone / Strength	Normal muscle tone/strength  • Abnormal Movements	No abnormal movements  • Gait / Station	Normal gait / station  • Speech Volume	Loud  • Speech Rate	rapid  • Speech Spontaneity	Normal  • Speech Articulation	Normal  • Mood	irritated  • Affect Quality	irritated   • Affect Range	Labile  • Affect Congruence	Congruent  • Thought Process	Linear  • Thought Associations	Normal  • Thought Content	preoccupations  • Perceptions	No abnormalities  • Oriented to Time	Yes  • Oriented to Place	Yes  • Oriented to Situation	Yes  • Oriented to Person	Yes  • Attention / Concentration	Normal  • Estimated Intelligence	Average  • Recent Memory	Normal  • Remote Memory	Normal  • Fund of Knowledge	Normal  • Language	No abnormalities noted  • Judgment (regarding everyday events) poor  • Insight (regarding psychiatric illness)	poor    SUICIDALITY:   • Suicidality (Interval)	none known    HOMICIDALITY/AGGRESSION:   • Homicidality/Aggression	none known    DIAGNOSIS DSM-V:    Psychiatric Diagnosis (Corresponds to DSM-IV Axis I, II):   HEALTH ISSUES - PROBLEM Dx:  Psychosis, unspecified         Medical Diagnosis (Corresponds to DSM-IV Axis III):  • Axis III	  hypothyroidism     ASSESSMENT OF CURRENT CONDITION:   Summary (include case differential, formulation and patient response to therapy): 77 year old female seen at bedside today with continued preoccupations of thyroid condition and poor insight of psychiatric needs. Pt is awaiting in-patient psychiatric transfer. Pt continues to deny need for psychiatric medications/evaluation.     Risk Assessment (consider static vs modifiable risk factors and protective factors; comment on level of risk for dangerous behavior):   RF: easily irritated, psychotic behavior  PF: no previous suicide attempts, no history of aggressive behavior   Moderate Risk     PLAN: Continue to explore in patient transfer options; Continue medications as prescribed.

## 2022-08-15 NOTE — ED ADULT NURSE REASSESSMENT NOTE - CONDITION
unchanged

## 2022-08-15 NOTE — CHART NOTE - NSCHARTNOTEFT_GEN_A_CORE
ANNEMARIE Note: Followed up on referrals made to [facility] for inpt psych transfer [phone]. Spoke with Josephine. Pt accepted and bed available today. Need updated covid result, pt swabbed and result pending, and ins auth. [facility] [phone] spoke with Angelica and case registered and  approved for 2 days 8/15-8/16, ref# [id number]. Initials faxed to [phone]. Spoke with pts dtr [phone]. She is aware of the transfer plans to [facility]. She provided a medicare ID for pt with plan B coverage ID# [id number] and its under her maiden name Ira. Info forwarded to josephine at [facility].   Will arrange belkis when covid result is complete.

## 2022-08-15 NOTE — ED ADULT NURSE REASSESSMENT NOTE - STATUS
awaiting transfer, no change
awaiting discharge, no change
awaiting transfer, no change

## 2022-08-15 NOTE — ED CDU PROVIDER INITIAL DAY NOTE - OBJECTIVE STATEMENT
Continuation chart of ED visit from 8/9/2022    The patient is a 76 y/o Female w/ PMH of MDD and a Thyroid pathology (unclear) BIBA for bizarre behavior. Pt is adamant that she get a  for [place] from [facility].  #[id number]. Pt states that she is here to see her Thyroid specialist, Dr. Martinez. Pt is able to state that she is in no pain, does not smoke or use alcohol, and takes a medication for her thyroid. During the interview, the pt began to appear worried and grabbed MS4's hands. She stated that a boy with schizophrenia was "going to do damage". She was fearful but reassured for her safety here in the ED. The MD was able to call daughter at: [phone] who states that the pt has dementia and recently moved from [place] to [place] to live with her, her partner, and kids. Since the move, the pt has been exhibiting bizarre behavior; one example was that a tree branch fell outside the house, per daughter. The daughter states that the pt believed that the branch was a person trying to kill them.

## 2022-08-15 NOTE — ED PROVIDER NOTE - OBJECTIVE STATEMENT
Continuation chart of ED visit from 8/9/2022    The patient is a 76 y/o Female w/ PMH of MDD and a Thyroid pathology (unclear) BIBA for bizarre behavior. Pt is adamant that she get a  for [place] from [facility].  #[id number]. Pt states that she is here to see her Thyroid specialist, Dr. Martinez. Pt is able to state that she is in no pain, does not smoke or use alcohol, and takes a medication for her thyroid. During the interview, the pt began to appear worried and grabbed MS4's hands. She stated that a boy with schizophrenia was "going to do damage". She was fearful but reassured for her safety here in the ED. The MD was able to call daughter at: [phone] who states that the pt has dementia and recently moved from [place] to [place] to live with her, her partner, and kids. Since the move, the pt has been exhibiting bizarre behavior; one example was that a tree branch fell outside the house, per daughter. The daughter states that the pt believed that the branch was a person trying to kill them.

## 2022-08-15 NOTE — ED PROVIDER NOTE - PHYSICAL EXAMINATION
General: Patient is in no distress and sitting comfortably on hospital bed.  	Cardiac: Regular rate rhythm  	Pulm: Clear to auscultation bilaterally, with no crackles, rhonchi, or wheezes appreciated.  	Abd: Soft nontender, nondistended, abdomen. No guarding or rebound tenderness.  	Skin: Skin is warm and dry with healed excoriations noted on the lower extremities, more pronounced on the pt's LLE.  	Ext: mild bilateral edema.   Neuro: Limited exam. AAOx2 to person and place

## 2022-08-15 NOTE — ED PROVIDER NOTE - CLINICAL SUMMARY MEDICAL DECISION MAKING FREE TEXT BOX
Continuation of previous visit, pt remains in  awaiting inpatient psychiatric admission. Pt is medically clear for psychiatric admission.

## 2022-08-15 NOTE — ED PROVIDER NOTE - PROGRESS NOTE DETAILS
Maira: Examined pt for rashes, has healed excoriated wounds to the left LE but no acute rash or infections requiring treatment that should preclude psychiatric hospitalization.

## 2022-09-16 ENCOUNTER — INPATIENT (INPATIENT)
Facility: HOSPITAL | Age: 77
LOS: 4 days | Discharge: PSYCHIATRIC OR SUBSTANCE USE FACILITY | DRG: 309 | End: 2022-09-21
Attending: STUDENT IN AN ORGANIZED HEALTH CARE EDUCATION/TRAINING PROGRAM | Admitting: HOSPITALIST
Payer: COMMERCIAL

## 2022-09-16 VITALS
RESPIRATION RATE: 16 BRPM | HEIGHT: 71 IN | TEMPERATURE: 98 F | DIASTOLIC BLOOD PRESSURE: 74 MMHG | OXYGEN SATURATION: 95 % | WEIGHT: 169.98 LBS | HEART RATE: 121 BPM | SYSTOLIC BLOOD PRESSURE: 157 MMHG

## 2022-09-16 DIAGNOSIS — I48.91 UNSPECIFIED ATRIAL FIBRILLATION: ICD-10-CM

## 2022-09-16 LAB
ALBUMIN SERPL ELPH-MCNC: 4.2 G/DL — SIGNIFICANT CHANGE UP (ref 3.3–5.2)
ALP SERPL-CCNC: 73 U/L — SIGNIFICANT CHANGE UP (ref 40–120)
ALT FLD-CCNC: 20 U/L — SIGNIFICANT CHANGE UP
ANION GAP SERPL CALC-SCNC: 13 MMOL/L — SIGNIFICANT CHANGE UP (ref 5–17)
APTT BLD: 28.8 SEC — SIGNIFICANT CHANGE UP (ref 27.5–35.5)
AST SERPL-CCNC: 24 U/L — SIGNIFICANT CHANGE UP
BASOPHILS # BLD AUTO: 0.02 K/UL — SIGNIFICANT CHANGE UP (ref 0–0.2)
BASOPHILS NFR BLD AUTO: 0.2 % — SIGNIFICANT CHANGE UP (ref 0–2)
BILIRUB SERPL-MCNC: 0.2 MG/DL — LOW (ref 0.4–2)
BUN SERPL-MCNC: 18.7 MG/DL — SIGNIFICANT CHANGE UP (ref 8–20)
CALCIUM SERPL-MCNC: 9.6 MG/DL — SIGNIFICANT CHANGE UP (ref 8.4–10.5)
CHLORIDE SERPL-SCNC: 102 MMOL/L — SIGNIFICANT CHANGE UP (ref 98–107)
CO2 SERPL-SCNC: 22 MMOL/L — SIGNIFICANT CHANGE UP (ref 22–29)
CREAT SERPL-MCNC: 0.83 MG/DL — SIGNIFICANT CHANGE UP (ref 0.5–1.3)
EGFR: 73 ML/MIN/1.73M2 — SIGNIFICANT CHANGE UP
EOSINOPHIL # BLD AUTO: 0.05 K/UL — SIGNIFICANT CHANGE UP (ref 0–0.5)
EOSINOPHIL NFR BLD AUTO: 0.5 % — SIGNIFICANT CHANGE UP (ref 0–6)
GLUCOSE SERPL-MCNC: 129 MG/DL — HIGH (ref 70–99)
HCT VFR BLD CALC: 37.4 % — SIGNIFICANT CHANGE UP (ref 34.5–45)
HGB BLD-MCNC: 12.4 G/DL — SIGNIFICANT CHANGE UP (ref 11.5–15.5)
IMM GRANULOCYTES NFR BLD AUTO: 0.3 % — SIGNIFICANT CHANGE UP (ref 0–0.9)
INR BLD: 1.03 RATIO — SIGNIFICANT CHANGE UP (ref 0.88–1.16)
LYMPHOCYTES # BLD AUTO: 0.92 K/UL — LOW (ref 1–3.3)
LYMPHOCYTES # BLD AUTO: 9.2 % — LOW (ref 13–44)
MCHC RBC-ENTMCNC: 30.2 PG — SIGNIFICANT CHANGE UP (ref 27–34)
MCHC RBC-ENTMCNC: 33.2 GM/DL — SIGNIFICANT CHANGE UP (ref 32–36)
MCV RBC AUTO: 91 FL — SIGNIFICANT CHANGE UP (ref 80–100)
MONOCYTES # BLD AUTO: 0.4 K/UL — SIGNIFICANT CHANGE UP (ref 0–0.9)
MONOCYTES NFR BLD AUTO: 4 % — SIGNIFICANT CHANGE UP (ref 2–14)
NEUTROPHILS # BLD AUTO: 8.56 K/UL — HIGH (ref 1.8–7.4)
NEUTROPHILS NFR BLD AUTO: 85.8 % — HIGH (ref 43–77)
PLATELET # BLD AUTO: 259 K/UL — SIGNIFICANT CHANGE UP (ref 150–400)
POTASSIUM SERPL-MCNC: 3.9 MMOL/L — SIGNIFICANT CHANGE UP (ref 3.5–5.3)
POTASSIUM SERPL-SCNC: 3.9 MMOL/L — SIGNIFICANT CHANGE UP (ref 3.5–5.3)
PROT SERPL-MCNC: 7.3 G/DL — SIGNIFICANT CHANGE UP (ref 6.6–8.7)
PROTHROM AB SERPL-ACNC: 11.9 SEC — SIGNIFICANT CHANGE UP (ref 10.5–13.4)
RBC # BLD: 4.11 M/UL — SIGNIFICANT CHANGE UP (ref 3.8–5.2)
RBC # FLD: 12.2 % — SIGNIFICANT CHANGE UP (ref 10.3–14.5)
SODIUM SERPL-SCNC: 136 MMOL/L — SIGNIFICANT CHANGE UP (ref 135–145)
T3 SERPL-MCNC: 109 NG/DL — SIGNIFICANT CHANGE UP (ref 80–200)
T4 AB SER-ACNC: 8.4 UG/DL — SIGNIFICANT CHANGE UP (ref 4.5–12)
TROPONIN T SERPL-MCNC: <0.01 NG/ML — SIGNIFICANT CHANGE UP (ref 0–0.06)
TSH SERPL-MCNC: 0.47 UIU/ML — SIGNIFICANT CHANGE UP (ref 0.27–4.2)
WBC # BLD: 9.98 K/UL — SIGNIFICANT CHANGE UP (ref 3.8–10.5)
WBC # FLD AUTO: 9.98 K/UL — SIGNIFICANT CHANGE UP (ref 3.8–10.5)

## 2022-09-16 PROCEDURE — 71045 X-RAY EXAM CHEST 1 VIEW: CPT | Mod: 26

## 2022-09-16 PROCEDURE — 99285 EMERGENCY DEPT VISIT HI MDM: CPT

## 2022-09-16 PROCEDURE — 93010 ELECTROCARDIOGRAM REPORT: CPT

## 2022-09-16 RX ORDER — METOPROLOL TARTRATE 50 MG
5 TABLET ORAL ONCE
Refills: 0 | Status: DISCONTINUED | OUTPATIENT
Start: 2022-09-16 | End: 2022-09-16

## 2022-09-16 NOTE — CONSULT NOTE ADULT - PROBLEM SELECTOR RECOMMENDATION 9
Newly dx A-fib. Pt denies any past cardiac hx  TFTs WNR  EKG shows   Telemonitor  HR controlled  May use Metoprolol 5 mg IVP for sustained HR >120   Start Metoprolol 25 mg BID with strict parameters  Pt states having allergic reaction to ASA and "pink pill, Blood thinner" Plavix?  [id number] VASc:        Start full AC with Heparin. Monitor coag panel and daily CBC. No Known allergies to heparin  Trend CE. Trend trops x 3 q6. Serial EKGs  Replace all electrolytes. Maintain K+~4 and mag~2  A1C, lipid panel fasting, to ro comorbidities  Echo to assess structural and functional status Newly dx A-fib. Pt denies any past cardiac hx  TFTs WNR  EKG shows   Trop x 1 negative   Telemonitor  HR controlled  May use Metoprolol 5 mg IVP for sustained HR >120   Start Metoprolol 25 mg BID with strict parameters  Pt states having allergic reaction to ASA and "pink pill, Blood thinner" Plavix?  [id number] VASc:   3 points (age +2 and gender +1)  Stroke risk was 3.2% per year in >90,000 patients (the [place] Atrial Fibrillation Cohort Study) and 4.6% risk of stroke/TIA/systemic embolism.  Start full AC with Heparin. Monitor coag panel and daily CBC. No Known allergies to heparin  Trend CE. Trend trops x 3 q6. Serial EKGs  Replace all electrolytes. Maintain K+~4 and mag~2  A1C, lipid panel fasting, to ro comorbidities  Echo to assess structural and functional status    Further recommendations base on above findings Newly dx A-fib. Pt denies any past cardiac hx. No palpitations or chest pain  TFTs WNR  EKG shows a-fib with rvr (112BPM) with new q waves on anterior leads (v1-v2) compared to EKG from 08/09/2022  Trop x 1 negative   Telemonitor  HR controlled  May use Metoprolol 5 mg IVP for sustained HR >120   Start Metoprolol 25 mg BID with strict parameters  Pt states having allergic reaction to ASA and "pink pill, Blood thinner" Plavix? in the past   [id number] VASc:   3 points (age +2 and gender +1)  Stroke risk was 3.2% per year and 4.6% risk of stroke/TIA/systemic embolism.  Start full AC with Heparin. Monitor coag panel and daily CBC. No Known allergies to heparin  Trend CE. Trend trops x 3 q6. Serial EKGs  Replace all electrolytes. Maintain K+~4 and mag~2  A1C, lipid panel fasting, to ro comorbidities  Echo to assess structural and functional status    Further recommendations base on above findings Newly dx A-fib. Pt denies any past cardiac hx. No palpitations or chest pain  TFTs WNR  EKG shows a-fib with rvr (112BPM) with new q waves on anterior leads (v1-v2) compared to EKG from 08/09/2022  Trop x 1 negative   Telemonitor  HR controlled  May use Metoprolol 5 mg IVP for sustained HR >120   Start Metoprolol 25 mg BID with strict parameters  Pt states having allergic reaction to ASA and "pink pill, Blood thinner" Plavix? in the past   [id number] VASc:   3 points (age +2 and gender +1)  Stroke risk was 3.2% per year and 4.6% risk of stroke/TIA/systemic embolism.  DC Heparin, start Eliquis 5 mg BID  Trend CE. Trend trops x 3 q6. Serial EKGs  Replace all electrolytes. Maintain K+~4 and mag~2  A1C, lipid panel fasting, to ro comorbidities  Echo to assess structural and functional status  EP consult     Further recommendations base on above findings  Case discussed with Dr Parmar

## 2022-09-16 NOTE — ED PROVIDER NOTE - PHYSICAL EXAMINATION
General: well appearing, NAD  Head: NC, AT  EENT: EOMI, no scleral icterus  Cardiac: RRR, no apparent murmurs, no lower extremity edema  Respiratory: CTABL, no respiratory distress   Abdomen: soft, ND, NT, nonperitonitic  MSK/Vascular: full ROM, soft compartments, warm extremities  Neuro: AAOx2, sensation to light touch intact  Psych: calm, cooperative, not combative here

## 2022-09-16 NOTE — CONSULT NOTE ADULT - SUBJECTIVE AND OBJECTIVE BOX
[facility]                                              CARDIOLOGY AT [place] [address]                                             Telephone: [phone]. Fax:[phone]      CARDIOLOGY CONSULTATION NOTE                                                                                             History obtained by: Patient and medical record  Community Cardiologist: In [facility]. Pt doesnt recall name    obtained: No   Reason for Consultation: New onset of A-fib    Chief complaint:   Patient is a 77y old  Female who presents with a chief complaint of  aggressive behavior    HPI: 77 y f with PMHx of dementia, and hypothyroidism presenting for aggressive behavior from home. History collected from daughter as well as patient. Was recently discharged for the same. Had been at [place] and was given Haldol and Olanzapine but pt's daughter has not been able to give them to the patient due to pt not trusting her. No physical altercation but pt is verbally abusive to daughter and daughters children. No cp, sob, f/c, n/v, ab pain, dysuria, diarrhea, sweats    CARDIAC TESTING     PAST MEDICAL HISTORY  Dementia  History of thyroid disorder    PAST SURGICAL HISTORY  No pertinent PSHx     SOCIAL HISTORY: Denies smoking/alcohol/drugs    FAMILY HISTORY:  Family History of Cardiovascular Disease:   No  Coronary Artery Disease in first degree relative: No   Sudden Cardiac Death in First degree relative: No     HOME MEDICATIONS:   mirtazapine 30 mg oral tablet: 1 tab(s) orally once a day (at bedtime) (09 Aug 2022 20:15)  Synthroid 137 mcg (0.137 mg) oral tablet: 1 tab(s) orally once a day (09 Aug 2022 20:14)    CURRENT CARDIAC MEDICATIONS:     CURRENT OTHER MEDICATIONS:     ALLERGIES:   ASA and "pink pill" (blood thinner)     REVIEW OF SYMPTOMS:   CONSTITUTIONAL: No fever, no chills, no weight loss, no weight gain, no fatigue   ENMT:  No vertigo; No sinus or throat pain  NECK: No pain or stiffness  CARDIOVASCULAR: No chest pain, no dyspnea, no syncope/presyncope, no fatigue, no palpitations, no dizziness, no Orthopnea, no Paroxsymal nocturnal dyspnea  RESPIRATORY: No shortness of breath, no cough, no wheezing  : No dysuria, no hematuria   GI: no nausea, no diarrhea, no constipation, no abdominal pain,   NEURO: No headache, no slurred speech   MUSCULOSKELETAL: No joint pain or swelling; No muscle, back, or extremity pain  PSYCH: + Mild agitation, + anxiety.    ALL OTHER REVIEW OF SYSTEMS ARE NEGATIVE.    VITAL SIGNS:   T(C): 36.4 (09-16-22 @ 15:14), Max: 36.4 (09-16-22 @ 15:14)  T(F): 97.5 (09-16-22 @ 15:14), Max: 97.5 (09-16-22 @ 15:14)  HR: 95 (09-16-22 @ 18:32) (95 - 121)  BP: 118/81 (09-16-22 @ 18:32) (118/81 - 157/74)  RR: 18 (09-16-22 @ 18:32) (16 - 18)  SpO2: 98% (09-16-22 @ 18:32) (95% - 98%)    PHYSICAL EXAM:   Constitutional: Comfortable . No acute distress.   HEENT: Atraumatic and normocephalic , neck is supple . no JVD.   CNS: A&Ox3. No focal deficits.   Respiratory: CTAB, unlabored. No wheezing, No crackles or rhonchi   Cardiovascular: +  Irregular, irregular.  normal s1 s2. No murmur. No rubs or gallop.  Gastrointestinal: Soft, non-tender. +Bowel sounds.   Extremities: 2+ Peripheral Pulses, No edema  Psychiatric: + Mild agitation.   Skin: Warm and dry    LABS:   ( 16 Sep 2022 16:16 )  Troponin T  <0.01,  CPK  X    , CKMB  X    , BNP X                   12.4   9.98  )-----------( 259      ( 16 Sep 2022 16:16 )             37.4     09-16    136  |  102  |  18.7  ----------------------------<  129<H>  3.9   |  22.0  |  0.83    Ca    9.6      16 Sep 2022 16:16    TPro  7.3  /  Alb  4.2  /  TBili  0.2<L>  /  DBili  x   /  AST  24  /  ALT  20  /  AlkPhos  73  09-16    PT/INR - ( 16 Sep 2022 16:16 )   PT: 11.9 sec;   INR: 1.03 ratio         PTT - ( 16 Sep 2022 16:16 )  PTT:28.8 sec    Thyroid Stimulating Hormone, Serum: 0.47 uIU/mL (09-16-22 @ 16:16)    ECG:   Prior ECG: Yes     RADIOLOGY & ADDITIONAL STUDIES:     Chest x-ray: Unremarkable. Final report/reading pending     CT Head No Cont (08.09.22 @ 16:21) >  IMPRESSION:  Mild chronic microvascular changes without evidence of an   acute transcortical infarction or hemorrhage.  < end of copied text >    Preliminary evaluation, please await official recommendations     Assessment and recommendations are final when note is signed by the attending.                                      [facility]                                              CARDIOLOGY AT [place] [address]                                             Telephone: [phone]. Fax:[phone]      CARDIOLOGY CONSULTATION NOTE                                                                                             History obtained by: Patient and medical record  Community Cardiologist: In [facility]. Pt doesnt recall name    obtained: No   Reason for Consultation: New onset of A-fib    Chief complaint:   Patient is a 77y old  Female who presents with a chief complaint of  aggressive behavior    HPI: 77 y f with PMHx of dementia, and hypothyroidism presenting for aggressive behavior from home. History collected from daughter as well as patient. Was recently discharged for the same. Had been at [place] and was given Haldol and Olanzapine but pt's daughter has not been able to give them to the patient due to pt not trusting her. No physical altercation but pt is verbally abusive to daughter and daughters children. No cp, sob, f/c, n/v, ab pain, dysuria, diarrhea, sweats    CARDIAC TESTING     PAST MEDICAL HISTORY  Dementia  History of thyroid disorder    PAST SURGICAL HISTORY  No pertinent PSHx     SOCIAL HISTORY: Denies smoking/alcohol/drugs    FAMILY HISTORY:  Family History of Cardiovascular Disease:   No  Coronary Artery Disease in first degree relative: No   Sudden Cardiac Death in First degree relative: No     HOME MEDICATIONS:   mirtazapine 30 mg oral tablet: 1 tab(s) orally once a day (at bedtime) (09 Aug 2022 20:15)  Synthroid 137 mcg (0.137 mg) oral tablet: 1 tab(s) orally once a day (09 Aug 2022 20:14)    CURRENT CARDIAC MEDICATIONS:     CURRENT OTHER MEDICATIONS:     ALLERGIES:   ASA and "pink pill" (blood thinner) Plavix?    REVIEW OF SYMPTOMS:   CONSTITUTIONAL: No fever, no chills, no weight loss, no weight gain, no fatigue   ENMT:  No vertigo; No sinus or throat pain  NECK: No pain or stiffness  CARDIOVASCULAR: No chest pain, no dyspnea, no syncope/presyncope, no fatigue, no palpitations, no dizziness, no Orthopnea, no Paroxsymal nocturnal dyspnea  RESPIRATORY: No shortness of breath, no cough, no wheezing  : No dysuria, no hematuria   GI: no nausea, no diarrhea, no constipation, no abdominal pain,   NEURO: No headache, no slurred speech   MUSCULOSKELETAL: No joint pain or swelling; No muscle, back, or extremity pain  PSYCH: + Mild agitation, + anxiety.    ALL OTHER REVIEW OF SYSTEMS ARE NEGATIVE.    VITAL SIGNS:   T(C): 36.4 (09-16-22 @ 15:14), Max: 36.4 (09-16-22 @ 15:14)  T(F): 97.5 (09-16-22 @ 15:14), Max: 97.5 (09-16-22 @ 15:14)  HR: 95 (09-16-22 @ 18:32) (95 - 121)  BP: 118/81 (09-16-22 @ 18:32) (118/81 - 157/74)  RR: 18 (09-16-22 @ 18:32) (16 - 18)  SpO2: 98% (09-16-22 @ 18:32) (95% - 98%)    PHYSICAL EXAM:   Constitutional: Comfortable . No acute distress.   HEENT: Atraumatic and normocephalic , neck is supple . no JVD.   CNS: A&Ox3. No focal deficits.   Respiratory: CTAB, unlabored. No wheezing, No crackles or rhonchi   Cardiovascular: +  Irregular, irregular.  normal s1 s2. No murmur. No rubs or gallop.  Gastrointestinal: Soft, non-tender. +Bowel sounds.   Extremities: 2+ Peripheral Pulses, No edema  Psychiatric: + Mild agitation.   Skin: Warm and dry    LABS:   ( 16 Sep 2022 16:16 )  Troponin T  <0.01,  CPK  X    , CKMB  X    , BNP X                   12.4   9.98  )-----------( 259      ( 16 Sep 2022 16:16 )             37.4     09-16    136  |  102  |  18.7  ----------------------------<  129<H>  3.9   |  22.0  |  0.83    Ca    9.6      16 Sep 2022 16:16    TPro  7.3  /  Alb  4.2  /  TBili  0.2<L>  /  DBili  x   /  AST  24  /  ALT  20  /  AlkPhos  73  09-16    PT/INR - ( 16 Sep 2022 16:16 )   PT: 11.9 sec;   INR: 1.03 ratio         PTT - ( 16 Sep 2022 16:16 )  PTT:28.8 sec    Thyroid Stimulating Hormone, Serum: 0.47 uIU/mL (09-16-22 @ 16:16)    ECG:   Prior ECG: Yes     RADIOLOGY & ADDITIONAL STUDIES:     Chest x-ray: Unremarkable. Final report/reading pending     CT Head No Cont (08.09.22 @ 16:21) >  IMPRESSION:  Mild chronic microvascular changes without evidence of an   acute transcortical infarction or hemorrhage.  < end of copied text >    Preliminary evaluation, please await official recommendations     Assessment and recommendations are final when note is signed by the attending.                                      [facility]                                              CARDIOLOGY AT [place] [address]                                             Telephone: [phone]. Fax:[phone]      CARDIOLOGY CONSULTATION NOTE                                                                                             History obtained by: Patient and medical record  Community Cardiologist: In [facility]. Pt doesnt recall name    obtained: No   Reason for Consultation: New onset of A-fib    Chief complaint:   Patient is a 77y old  Female who presents with a chief complaint of  aggressive behavior    HPI: 77 y f with PMHx of dementia, and hypothyroidism presenting for aggressive behavior from home. History collected from daughter as well as patient. Was recently discharged for the same. Had been at [place] and was given Haldol and Olanzapine but pt's daughter has not been able to give them to the patient due to pt not trusting her. No physical altercation but pt is verbally abusive to daughter and daughters children. No cp, sob, f/c, n/v, ab pain, dysuria, diarrhea, sweats    CARDIAC TESTING     PAST MEDICAL HISTORY  Dementia  History of thyroid disorder    PAST SURGICAL HISTORY  No pertinent PSHx     SOCIAL HISTORY: Denies smoking/alcohol/drugs    FAMILY HISTORY:  Family History of Cardiovascular Disease:   No  Coronary Artery Disease in first degree relative: No   Sudden Cardiac Death in First degree relative: No     HOME MEDICATIONS:   mirtazapine 30 mg oral tablet: 1 tab(s) orally once a day (at bedtime) (09 Aug 2022 20:15)  Synthroid 137 mcg (0.137 mg) oral tablet: 1 tab(s) orally once a day (09 Aug 2022 20:14)    CURRENT CARDIAC MEDICATIONS:     CURRENT OTHER MEDICATIONS:     ALLERGIES:   ASA and "pink pill" (blood thinner) Plavix?    REVIEW OF SYMPTOMS:   CONSTITUTIONAL: No fever, no chills, no weight loss, no weight gain, no fatigue   ENMT:  No vertigo; No sinus or throat pain  NECK: No pain or stiffness  CARDIOVASCULAR: No chest pain, no dyspnea, no syncope/presyncope, no fatigue, no palpitations, no dizziness, no Orthopnea, no Paroxsymal nocturnal dyspnea  RESPIRATORY: No shortness of breath, no cough, no wheezing  : No dysuria, no hematuria   GI: no nausea, no diarrhea, no constipation, no abdominal pain,   NEURO: No headache, no slurred speech   MUSCULOSKELETAL: No joint pain or swelling; No muscle, back, or extremity pain  PSYCH: + Mild agitation, + anxiety.    ALL OTHER REVIEW OF SYSTEMS ARE NEGATIVE.    VITAL SIGNS:   T(C): 36.4 (09-16-22 @ 15:14), Max: 36.4 (09-16-22 @ 15:14)  T(F): 97.5 (09-16-22 @ 15:14), Max: 97.5 (09-16-22 @ 15:14)  HR: 95 (09-16-22 @ 18:32) (95 - 121)  BP: 118/81 (09-16-22 @ 18:32) (118/81 - 157/74)  RR: 18 (09-16-22 @ 18:32) (16 - 18)  SpO2: 98% (09-16-22 @ 18:32) (95% - 98%)    PHYSICAL EXAM:   Constitutional: Comfortable . No acute distress.   HEENT: Atraumatic and normocephalic , neck is supple . no JVD.   CNS: A&Ox3. No focal deficits.   Respiratory: CTAB, unlabored. No wheezing, No crackles or rhonchi   Cardiovascular: +  Irregular, irregular.  normal s1 s2. No murmur. No rubs or gallop.  Gastrointestinal: Soft, non-tender. +Bowel sounds.   Extremities: 2+ Peripheral Pulses, No edema  Psychiatric: + Mild agitation.   Skin: Warm and dry    LABS:   ( 16 Sep 2022 16:16 )  Troponin T  <0.01,  CPK  X    , CKMB  X    , BNP X                   12.4   9.98  )-----------( 259      ( 16 Sep 2022 16:16 )             37.4     09-16    136  |  102  |  18.7  ----------------------------<  129<H>  3.9   |  22.0  |  0.83    Ca    9.6      16 Sep 2022 16:16    TPro  7.3  /  Alb  4.2  /  TBili  0.2<L>  /  DBili  x   /  AST  24  /  ALT  20  /  AlkPhos  73  09-16    PT/INR - ( 16 Sep 2022 16:16 )   PT: 11.9 sec;   INR: 1.03 ratio         PTT - ( 16 Sep 2022 16:16 )  PTT:28.8 sec    Thyroid Stimulating Hormone, Serum: 0.47 uIU/mL (09-16-22 @ 16:16)    EKG shows a-fib with rvr (112BPM) with new q waves on anterior leads (v1-v2) compared to EKG from 08/09/2022  Prior ECG: Yes     RADIOLOGY & ADDITIONAL STUDIES:     Chest x-ray: Unremarkable. Final report/reading pending     CT Head No Cont (08.09.22 @ 16:21) >  IMPRESSION:  Mild chronic microvascular changes without evidence of an   acute transcortical infarction or hemorrhage.  < end of copied text >    Preliminary evaluation, please await official recommendations     Assessment and recommendations are final when note is signed by the attending.

## 2022-09-16 NOTE — ED ADULT TRIAGE NOTE - PAIN RATING/NUMBER SCALE (0-10): REST
Patient called c/o of IV site hurting so bad and wanted it taken out. Site is slightly swollen. Patient is crying. PIV taken out. Patient is willing to have another PIV put in but not at this time, and she will let the nurse know when she is ready. 0500- Attempt to start a new IV line by charge nurse unsuccessful. Patient is very uncooperative . 0

## 2022-09-16 NOTE — ED ADULT TRIAGE NOTE - STATUS:
umbilicus region 9oclock small area of erythema and tenderness no induration or fluctiance noted, no tendeness to abd wall,
Applied

## 2022-09-16 NOTE — ED ADULT NURSE NOTE - OBJECTIVE STATEMENT
Patient presenting to ED complaining of aggressive behavior. Patient has a HX of Dementia and schizoprenia. Patient arguing with daughter. As per daughter patient is non complaint with her medications as the patient does not trust her. Labs sent. No distress noted.

## 2022-09-16 NOTE — ED PROVIDER NOTE - PROGRESS NOTE DETAILS
Abraham: Patient signed out to me by previous team. Patient evaluated bedside and is comfortable w/o concern at this time. Pending evaluation by cardiology.

## 2022-09-16 NOTE — ED ADULT NURSE NOTE - CHPI ED NUR TIMING2
Patient was not available for the therapy session at this time.  Reason not seen: Patient requesting no therapy today;Patient reporting feeling too ill (03/11/19 1145).    Re-Attempt Plan: Will re-attempt per established treatment plan (03/11/19 1145).     sudden onset

## 2022-09-16 NOTE — ED PROVIDER NOTE - OBJECTIVE STATEMENT
77 y f with pmh of dementia, hypothyroidism presenting for aggressive behavior from home. History collected from daughter as well as patient. Was recently discharged for the same. Had been at [place] and was given Haldol and Olanzapine but pt's daughter has not been able to give them to the patient due to pt not trusting her. No physical altercation but pt is verbally abusive to daughter and daughters children. No cp, sob, f/c, n/v, ab pain, dysuria, diarrhea, sweats.

## 2022-09-16 NOTE — ED PROVIDER NOTE - ATTENDING CONTRIBUTION TO CARE
Pt brought in by ems sent in by family for aggression. no other history given. Pt calm and cooperative here.    physical - tachy irregularly irregular. ctab. abd - soft, nt. no edema. no rash. alert and oriented x1.    plan - EKG with new onset AF.  labs reviewed. Pt signed out to dr. ritchie pending cards.

## 2022-09-16 NOTE — CONSULT NOTE ADULT - ASSESSMENT
Patient is a 77y old  Female who presents with a chief complaint of aggressive behavior with incidental finding of new onset of A-fib

## 2022-09-16 NOTE — ED ADULT TRIAGE NOTE - CHIEF COMPLAINT QUOTE
Patient was eating food and suddenly became aggressive with her daughter. Patient was agitated upon EMS arrival. Hx of schizophrenia and dementia, noncompliant with medications.

## 2022-09-17 DIAGNOSIS — Z94.7 CORNEAL TRANSPLANT STATUS: Chronic | ICD-10-CM

## 2022-09-17 DIAGNOSIS — Z90.49 ACQUIRED ABSENCE OF OTHER SPECIFIED PARTS OF DIGESTIVE TRACT: Chronic | ICD-10-CM

## 2022-09-17 DIAGNOSIS — F25.0 SCHIZOAFFECTIVE DISORDER, BIPOLAR TYPE: ICD-10-CM

## 2022-09-17 DIAGNOSIS — I48.91 UNSPECIFIED ATRIAL FIBRILLATION: ICD-10-CM

## 2022-09-17 LAB
A1C WITH ESTIMATED AVERAGE GLUCOSE RESULT: 5.6 % — SIGNIFICANT CHANGE UP (ref 4–5.6)
ANION GAP SERPL CALC-SCNC: 12 MMOL/L — SIGNIFICANT CHANGE UP (ref 5–17)
APPEARANCE UR: CLEAR — SIGNIFICANT CHANGE UP
APTT BLD: 31.5 SEC — SIGNIFICANT CHANGE UP (ref 27.5–35.5)
BASOPHILS # BLD AUTO: 0.01 K/UL — SIGNIFICANT CHANGE UP (ref 0–0.2)
BASOPHILS NFR BLD AUTO: 0.2 % — SIGNIFICANT CHANGE UP (ref 0–2)
BILIRUB UR-MCNC: NEGATIVE — SIGNIFICANT CHANGE UP
BUN SERPL-MCNC: 23.2 MG/DL — HIGH (ref 8–20)
CALCIUM SERPL-MCNC: 8.8 MG/DL — SIGNIFICANT CHANGE UP (ref 8.4–10.5)
CHLORIDE SERPL-SCNC: 104 MMOL/L — SIGNIFICANT CHANGE UP (ref 98–107)
CHOLEST SERPL-MCNC: 200 MG/DL — HIGH
CK MB CFR SERPL CALC: 7.2 NG/ML — HIGH (ref 0–6.7)
CK SERPL-CCNC: 197 U/L — HIGH (ref 25–170)
CO2 SERPL-SCNC: 23 MMOL/L — SIGNIFICANT CHANGE UP (ref 22–29)
COLOR SPEC: YELLOW — SIGNIFICANT CHANGE UP
CREAT SERPL-MCNC: 0.61 MG/DL — SIGNIFICANT CHANGE UP (ref 0.5–1.3)
DIFF PNL FLD: ABNORMAL
EGFR: 93 ML/MIN/1.73M2 — SIGNIFICANT CHANGE UP
EOSINOPHIL # BLD AUTO: 0.18 K/UL — SIGNIFICANT CHANGE UP (ref 0–0.5)
EOSINOPHIL NFR BLD AUTO: 3.3 % — SIGNIFICANT CHANGE UP (ref 0–6)
EPI CELLS # UR: SIGNIFICANT CHANGE UP
ESTIMATED AVERAGE GLUCOSE: 114 MG/DL — SIGNIFICANT CHANGE UP (ref 68–114)
GLUCOSE SERPL-MCNC: 98 MG/DL — SIGNIFICANT CHANGE UP (ref 70–99)
GLUCOSE UR QL: NEGATIVE MG/DL — SIGNIFICANT CHANGE UP
HCT VFR BLD CALC: 36.4 % — SIGNIFICANT CHANGE UP (ref 34.5–45)
HDLC SERPL-MCNC: 57 MG/DL — SIGNIFICANT CHANGE UP
HGB BLD-MCNC: 11.9 G/DL — SIGNIFICANT CHANGE UP (ref 11.5–15.5)
IMM GRANULOCYTES NFR BLD AUTO: 0.2 % — SIGNIFICANT CHANGE UP (ref 0–0.9)
KETONES UR-MCNC: NEGATIVE — SIGNIFICANT CHANGE UP
LEUKOCYTE ESTERASE UR-ACNC: NEGATIVE — SIGNIFICANT CHANGE UP
LIPID PNL WITH DIRECT LDL SERPL: 126 MG/DL — HIGH
LYMPHOCYTES # BLD AUTO: 1.88 K/UL — SIGNIFICANT CHANGE UP (ref 1–3.3)
LYMPHOCYTES # BLD AUTO: 34.2 % — SIGNIFICANT CHANGE UP (ref 13–44)
MCHC RBC-ENTMCNC: 30.4 PG — SIGNIFICANT CHANGE UP (ref 27–34)
MCHC RBC-ENTMCNC: 32.7 GM/DL — SIGNIFICANT CHANGE UP (ref 32–36)
MCV RBC AUTO: 93.1 FL — SIGNIFICANT CHANGE UP (ref 80–100)
MONOCYTES # BLD AUTO: 0.5 K/UL — SIGNIFICANT CHANGE UP (ref 0–0.9)
MONOCYTES NFR BLD AUTO: 9.1 % — SIGNIFICANT CHANGE UP (ref 2–14)
NEUTROPHILS # BLD AUTO: 2.92 K/UL — SIGNIFICANT CHANGE UP (ref 1.8–7.4)
NEUTROPHILS NFR BLD AUTO: 53 % — SIGNIFICANT CHANGE UP (ref 43–77)
NITRITE UR-MCNC: NEGATIVE — SIGNIFICANT CHANGE UP
NON HDL CHOLESTEROL: 143 MG/DL — HIGH
PH UR: 6 — SIGNIFICANT CHANGE UP (ref 5–8)
PLATELET # BLD AUTO: 232 K/UL — SIGNIFICANT CHANGE UP (ref 150–400)
POTASSIUM SERPL-MCNC: 4.1 MMOL/L — SIGNIFICANT CHANGE UP (ref 3.5–5.3)
POTASSIUM SERPL-SCNC: 4.1 MMOL/L — SIGNIFICANT CHANGE UP (ref 3.5–5.3)
PROT UR-MCNC: NEGATIVE — SIGNIFICANT CHANGE UP
RAPID RVP RESULT: SIGNIFICANT CHANGE UP
RBC # BLD: 3.91 M/UL — SIGNIFICANT CHANGE UP (ref 3.8–5.2)
RBC # FLD: 12.5 % — SIGNIFICANT CHANGE UP (ref 10.3–14.5)
RBC CASTS # UR COMP ASSIST: SIGNIFICANT CHANGE UP /HPF (ref 0–4)
SARS-COV-2 RNA SPEC QL NAA+PROBE: SIGNIFICANT CHANGE UP
SODIUM SERPL-SCNC: 139 MMOL/L — SIGNIFICANT CHANGE UP (ref 135–145)
SP GR SPEC: 1.02 — SIGNIFICANT CHANGE UP (ref 1.01–1.02)
TRIGL SERPL-MCNC: 85 MG/DL — SIGNIFICANT CHANGE UP
TROPONIN T SERPL-MCNC: <0.01 NG/ML — SIGNIFICANT CHANGE UP (ref 0–0.06)
UROBILINOGEN FLD QL: NEGATIVE MG/DL — SIGNIFICANT CHANGE UP
WBC # BLD: 5.5 K/UL — SIGNIFICANT CHANGE UP (ref 3.8–10.5)
WBC # FLD AUTO: 5.5 K/UL — SIGNIFICANT CHANGE UP (ref 3.8–10.5)
WBC UR QL: SIGNIFICANT CHANGE UP /HPF (ref 0–5)

## 2022-09-17 PROCEDURE — 93010 ELECTROCARDIOGRAM REPORT: CPT

## 2022-09-17 PROCEDURE — 99223 1ST HOSP IP/OBS HIGH 75: CPT

## 2022-09-17 PROCEDURE — 93306 TTE W/DOPPLER COMPLETE: CPT | Mod: 26

## 2022-09-17 PROCEDURE — 12345: CPT | Mod: NC

## 2022-09-17 PROCEDURE — 90792 PSYCH DIAG EVAL W/MED SRVCS: CPT

## 2022-09-17 RX ORDER — HALOPERIDOL DECANOATE 100 MG/ML
1 INJECTION INTRAMUSCULAR
Refills: 0 | Status: DISCONTINUED | OUTPATIENT
Start: 2022-09-17 | End: 2022-09-21

## 2022-09-17 RX ORDER — OXYBUTYNIN CHLORIDE 5 MG
5 TABLET ORAL DAILY
Refills: 0 | Status: DISCONTINUED | OUTPATIENT
Start: 2022-09-17 | End: 2022-09-21

## 2022-09-17 RX ORDER — LANOLIN ALCOHOL/MO/W.PET/CERES
3 CREAM (GRAM) TOPICAL AT BEDTIME
Refills: 0 | Status: DISCONTINUED | OUTPATIENT
Start: 2022-09-17 | End: 2022-09-21

## 2022-09-17 RX ORDER — APIXABAN 2.5 MG/1
5 TABLET, FILM COATED ORAL EVERY 12 HOURS
Refills: 0 | Status: DISCONTINUED | OUTPATIENT
Start: 2022-09-17 | End: 2022-09-21

## 2022-09-17 RX ORDER — ACETAMINOPHEN 500 MG
650 TABLET ORAL EVERY 6 HOURS
Refills: 0 | Status: DISCONTINUED | OUTPATIENT
Start: 2022-09-17 | End: 2022-09-21

## 2022-09-17 RX ORDER — ONDANSETRON 8 MG/1
4 TABLET, FILM COATED ORAL EVERY 8 HOURS
Refills: 0 | Status: DISCONTINUED | OUTPATIENT
Start: 2022-09-17 | End: 2022-09-21

## 2022-09-17 RX ORDER — MIRTAZAPINE 45 MG/1
30 TABLET, ORALLY DISINTEGRATING ORAL AT BEDTIME
Refills: 0 | Status: DISCONTINUED | OUTPATIENT
Start: 2022-09-17 | End: 2022-09-21

## 2022-09-17 RX ORDER — HEPARIN SODIUM 5000 [USP'U]/ML
6500 INJECTION INTRAVENOUS; SUBCUTANEOUS EVERY 6 HOURS
Refills: 0 | Status: DISCONTINUED | OUTPATIENT
Start: 2022-09-17 | End: 2022-09-17

## 2022-09-17 RX ORDER — HEPARIN SODIUM 5000 [USP'U]/ML
3000 INJECTION INTRAVENOUS; SUBCUTANEOUS EVERY 6 HOURS
Refills: 0 | Status: DISCONTINUED | OUTPATIENT
Start: 2022-09-17 | End: 2022-09-17

## 2022-09-17 RX ORDER — METOPROLOL TARTRATE 50 MG
25 TABLET ORAL DAILY
Refills: 0 | Status: DISCONTINUED | OUTPATIENT
Start: 2022-09-17 | End: 2022-09-21

## 2022-09-17 RX ORDER — HEPARIN SODIUM 5000 [USP'U]/ML
INJECTION INTRAVENOUS; SUBCUTANEOUS
Qty: 25000 | Refills: 0 | Status: DISCONTINUED | OUTPATIENT
Start: 2022-09-17 | End: 2022-09-17

## 2022-09-17 RX ORDER — ENOXAPARIN SODIUM 100 MG/ML
40 INJECTION SUBCUTANEOUS EVERY 24 HOURS
Refills: 0 | Status: DISCONTINUED | OUTPATIENT
Start: 2022-09-17 | End: 2022-09-17

## 2022-09-17 RX ORDER — HEPARIN SODIUM 5000 [USP'U]/ML
6500 INJECTION INTRAVENOUS; SUBCUTANEOUS ONCE
Refills: 0 | Status: DISCONTINUED | OUTPATIENT
Start: 2022-09-17 | End: 2022-09-17

## 2022-09-17 RX ORDER — LEVOTHYROXINE SODIUM 125 MCG
137 TABLET ORAL DAILY
Refills: 0 | Status: DISCONTINUED | OUTPATIENT
Start: 2022-09-17 | End: 2022-09-21

## 2022-09-17 RX ORDER — APIXABAN 2.5 MG/1
5 TABLET, FILM COATED ORAL
Refills: 0 | Status: DISCONTINUED | OUTPATIENT
Start: 2022-09-17 | End: 2022-09-17

## 2022-09-17 RX ADMIN — Medication 137 MICROGRAM(S): at 05:54

## 2022-09-17 RX ADMIN — APIXABAN 5 MILLIGRAM(S): 2.5 TABLET, FILM COATED ORAL at 10:17

## 2022-09-17 RX ADMIN — HALOPERIDOL DECANOATE 1 MILLIGRAM(S): 100 INJECTION INTRAMUSCULAR at 05:54

## 2022-09-17 NOTE — PROGRESS NOTE ADULT - SUBJECTIVE AND OBJECTIVE BOX
[facility] Division of Hospital Medicine    Chief Complaint:      SUBJECTIVE / OVERNIGHT EVENTS:  Admitted overnight, seen at bedside in NAD. AAOx1. Unable to obtain ROS due to MS    MEDICATIONS  (STANDING):  apixaban 5 milliGRAM(s) Oral every 12 hours  haloperidol     Tablet 1 milliGRAM(s) Oral two times a day  levothyroxine 137 MICROGram(s) Oral daily  mirtazapine 30 milliGRAM(s) Oral at bedtime  oxybutynin 5 milliGRAM(s) Oral daily    MEDICATIONS  (PRN):  acetaminophen     Tablet .. 650 milliGRAM(s) Oral every 6 hours PRN Temp greater or equal to 38C (100.4F), Mild Pain (1 - 3)  aluminum hydroxide/magnesium hydroxide/simethicone Suspension 30 milliLiter(s) Oral every 4 hours PRN Dyspepsia  melatonin 3 milliGRAM(s) Oral at bedtime PRN Insomnia  ondansetron Injectable 4 milliGRAM(s) IV Push every 8 hours PRN Nausea and/or Vomiting        I&O's Summary      PHYSICAL EXAM:  Vital Signs Last 24 Hrs  T(C): 36.8 (17 Sep 2022 11:25), Max: 36.8 (17 Sep 2022 07:54)  T(F): 98.3 (17 Sep 2022 11:25), Max: 98.3 (17 Sep 2022 07:54)  HR: 67 (17 Sep 2022 11:25) (65 - 121)  BP: 110/65 (17 Sep 2022 11:25) (104/69 - 157/74)  BP(mean): --  RR: 20 (17 Sep 2022 11:25) (16 - 20)  SpO2: 99% (17 Sep 2022 11:25) (95% - 99%)    Parameters below as of 17 Sep 2022 11:25  Patient On (Oxygen Delivery Method): room air    General: Age-appearing, in no acute distress  Head: Normochephalic, atraumatic  ENMT: EOMI, neck supple  Cardiovascular: +S1, S2; Regular rate and rhythm, no murmurs, rubs, gallops  Respiratory: CTA BL, no wheezes, rales, rhonchi  Gastrointestinal: Abdomen soft, non-tender, +BS in all 4 quadrants  Extremities: No clubbing, cyanosis, or edema  Vascular: 2+ pulses, cap refill < 2 seconds  Neuro: Non-focal, sensation intact BL  Musculoskeletal: Normal tone, no deformities  Skin: Warm, dry; no acute rash seen  Psych: Appropriate, cooperative    LABS:                        11.9   5.50  )-----------( 232      ( 17 Sep 2022 04:50 )             36.4     -17    139  |  104  |  23.2<H>  ----------------------------<  98  4.1   |  23.0  |  0.61    Ca    8.8      17 Sep 2022 04:50    TPro  7.3  /  Alb  4.2  /  TBili  0.2<L>  /  DBili  x   /  AST  24  /  ALT  20  /  AlkPhos  73  09-16    PT/INR - ( 16 Sep 2022 16:16 )   PT: 11.9 sec;   INR: 1.03 ratio         PTT - ( 17 Sep 2022 06:52 )  PTT:31.5 sec  CARDIAC MARKERS ( 17 Sep 2022 04:50 )  x     / <0.01 ng/mL / 197 U/L / x     / 7.2 ng/mL  CARDIAC MARKERS ( 16 Sep 2022 16:16 )  x     / <0.01 ng/mL / x     / x     / x          Urinalysis Basic - ( 17 Sep 2022 05:57 )    Color: Yellow / Appearance: Clear / S.020 / pH: x  Gluc: x / Ketone: Negative  / Bili: Negative / Urobili: Negative mg/dL   Blood: x / Protein: Negative / Nitrite: Negative   Leuk Esterase: Negative / RBC: 0-2 /HPF / WBC 0-2 /HPF   Sq Epi: x / Non Sq Epi: Occasional / Bacteria: x        CAPILLARY BLOOD GLUCOSE            RADIOLOGY & ADDITIONAL TESTS:  Results Reviewed: y  Imaging Personally Reviewed: n   Electrocardiogram Personally Reviewed: n

## 2022-09-17 NOTE — PROGRESS NOTE ADULT - ASSESSMENT
76 yo female with pmhx hypothyroidism, dementia with behavioral disturbance presenting to the ED with aggressive behavior per daughter.     New Onset A. Fib  -Admit to medicine with telemetry monitoring  -EKG showing a. fib @ 112 bpm, no prior cardiac history reported  -Trop neg x 1, trend trop/CK x 3  -HR dropped to 74 without medication, hold off on Beta-blocker for now and monitor closely  -May use Lopressor 5 mg IVP for HR > 130  -[id number] 3 (+2 for age, +1 for gender), patient has already converted back to sinus and is a fall risk given progressive dementia; will start heparin gtt per cardiology recommendations but will have to discuss risk/benefit with family in the am regarding long term AC  -TFTs wnl, check A1c/FLP in am  -Check TTE  -Monitor electrolytes maintain Mg > 2.0 and K > 4.0 for optimal arrhythmia suppression    Dementia with Behavioral Disturbance  -Patient seen here 1 month ago and sent to [place] for inpatient treatment, daughter now reporting similar symptoms  -Continue Haldol 1 mg BID and Mirtazapine 30 mg qhs  -Behavioral Health Consult    Hypothyroidism  -Continue Synthroid 137 mcg daily    Healthcare Maintenance  DVTppx: Lovenox  Diet: DASH  Dispo: Acutely ill

## 2022-09-17 NOTE — H&P ADULT - NSHPPHYSICALEXAM_GEN_ALL_CORE
Vital Signs Last 24 Hrs  T(C): 36.4 (16 Sep 2022 15:14), Max: 36.4 (16 Sep 2022 15:14)  T(F): 97.5 (16 Sep 2022 15:14), Max: 97.5 (16 Sep 2022 15:14)  HR: 95 (16 Sep 2022 18:32) (95 - 121)  BP: 118/81 (16 Sep 2022 18:32) (118/81 - 157/74)  BP(mean): --  RR: 18 (16 Sep 2022 18:32) (16 - 18)  SpO2: 98% (16 Sep 2022 18:32) (95% - 98%)    Parameters below as of 16 Sep 2022 18:32  Patient On (Oxygen Delivery Method): room air        General: Age-appearing, in no acute distress  Head: Normochephalic, atraumatic  ENMT: EOMI, neck supple  Cardiovascular: +S1, S2; Regular rate and rhythm, no murmurs, rubs, gallops  Respiratory: CTA BL, no wheezes, rales, rhonchi  Gastrointestinal: Abdomen soft, non-tender, +BS in all 4 quadrants  Extremities: No clubbing, cyanosis, or edema  Vascular: 2+ pulses, cap refill < 2 seconds  Neuro: Non-focal, AAOx4, sensation intact BL  Musculoskeletal: Normal tone, no deformities  Skin: Warm, dry; no acute rash seen  Psych: Appropriate, cooperative Vital Signs Last 24 Hrs  T(C): 36.4 (16 Sep 2022 15:14), Max: 36.4 (16 Sep 2022 15:14)  T(F): 97.5 (16 Sep 2022 15:14), Max: 97.5 (16 Sep 2022 15:14)  HR: 95 (16 Sep 2022 18:32) (95 - 121)  BP: 118/81 (16 Sep 2022 18:32) (118/81 - 157/74)  BP(mean): --  RR: 18 (16 Sep 2022 18:32) (16 - 18)  SpO2: 98% (16 Sep 2022 18:32) (95% - 98%)    Parameters below as of 16 Sep 2022 18:32  Patient On (Oxygen Delivery Method): room air    General: Age-appearing, in no acute distress  Head: Normochephalic, atraumatic  ENMT: EOMI, neck supple  Cardiovascular: +S1, S2; Regular rate and rhythm, no murmurs, rubs, gallops  Respiratory: CTA BL, no wheezes, rales, rhonchi  Gastrointestinal: Abdomen soft, non-tender, +BS in all 4 quadrants  Extremities: No clubbing, cyanosis, or edema  Vascular: 2+ pulses, cap refill < 2 seconds  Neuro: Non-focal, sensation intact BL  Musculoskeletal: Normal tone, no deformities  Skin: Warm, dry; no acute rash seen  Psych: Appropriate, cooperative

## 2022-09-17 NOTE — CONSULT NOTE ADULT - NS ATTEND AMEND GEN_ALL_CORE FT
Newly diagnosed atrial fibrillation. Converted spontaneously. Recommend eliquis 5 mg bid and toprol 25 mg daily with outpatient follow.
Newly diagnsoed AF: TFTs WNL.   EKG shows a-fib with rvr (112BPM) with new q waves on anterior leads (v1-v2) compared to EKG from 08/09/2022  [id number] VASc:   3   DC Heparin, start Eliquis 5 mg BID  Trend CE. Trend trops x 3 q6. Serial EKGs  Echo to assess structural and functional status  EP consult

## 2022-09-17 NOTE — PROVIDER CONTACT NOTE (OTHER) - ACTION/TREATMENT ORDERED:
Provider notified pt refusing AM labs because she states "they recently took her blood and does not want to be stuck again." Attempted to educated patient on importance of labs. Pt is agitated/refuses
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
## 2022-09-17 NOTE — H&P ADULT - NSHPLABSRESULTS_GEN_ALL_CORE
CARDIAC MARKERS ( 16 Sep 2022 16:16 )  x     / <0.01 ng/mL / x     / x     / x                                12.4   9.98  )-----------( 259      ( 16 Sep 2022 16:16 )             37.4     16 Sep 2022 16:16    136    |  102    |  18.7   ----------------------------<  129    3.9     |  22.0   |  0.83     Ca    9.6        16 Sep 2022 16:16    TPro  7.3    /  Alb  4.2    /  TBili  0.2    /  DBili  x      /  AST  24     /  ALT  20     /  AlkPhos  73     16 Sep 2022 16:16    PT/INR - ( 16 Sep 2022 16:16 )   PT: 11.9 sec;   INR: 1.03 ratio         PTT - ( 16 Sep 2022 16:16 )  PTT:28.8 sec  CAPILLARY BLOOD GLUCOSE        LIVER FUNCTIONS - ( 16 Sep 2022 16:16 )  Alb: 4.2 g/dL / Pro: 7.3 g/dL / ALK PHOS: 73 U/L / ALT: 20 U/L / AST: 24 U/L / GGT: x
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
## 2022-09-17 NOTE — H&P ADULT - ASSESSMENT
76 yo female with pmhx hypothyroidism, dementia with behavioral disturbance presenting to the ED with aggressive behavior per daughter.     New Onset A. Fib  -Admit to medicine with telemetry monitoring  -EKG showing a. fib @ 112 bpm, no prior cardiac history reported  -Trop neg x 1, trend trop/CK x 3  -HR dropped to 95 without medication, start low dose Metoprolol 12.5 mg BID and closely monitor BP  -May use Lopressor 5 mg IVP for HR > 130  -[id number] 3 (+2 for age, +1 for gender), patient has already converted back to sinus and is a fall risk given progressive dementia; will start heparin gtt per cardiology recommendations but will have to discuss risk/benefit with family in the am regarding long term AC  -TFTs wnl, check A1c/FLP in am  -Check TTE  -Monitor electrolytes maintain Mg > 2.0 and K > 4.0 for optimal arrhythmia suppression    Dementia with Behavioral Disturbance  -Patient seen here 1 month ago and sent to [place] for inpatient treatment, daughter now reporting similar symptoms  -Continue Haldol 1 mg BID and Mirtazapine 30 mg qhs  -Behavioral Health Consult    Hypothyroidism  -Continue Synthroid 137 mcg daily    DVTppx: Lovenox 78 yo female with pmhx hypothyroidism, dementia with behavioral disturbance presenting to the ED with aggressive behavior per daughter.     New Onset A. Fib  -Admit to medicine with telemetry monitoring  -EKG showing a. fib @ 112 bpm, no prior cardiac history reported  -Trop neg x 1, trend trop/CK x 3  -HR dropped to 74 without medication, hold off on Beta-blocker for now and monitor closely  -May use Lopressor 5 mg IVP for HR > 130  -[id number] 3 (+2 for age, +1 for gender), patient has already converted back to sinus and is a fall risk given progressive dementia; will start heparin gtt per cardiology recommendations but will have to discuss risk/benefit with family in the am regarding long term AC  -TFTs wnl, check A1c/FLP in am  -Check TTE  -Monitor electrolytes maintain Mg > 2.0 and K > 4.0 for optimal arrhythmia suppression    Dementia with Behavioral Disturbance  -Patient seen here 1 month ago and sent to [place] for inpatient treatment, daughter now reporting similar symptoms  -Continue Haldol 1 mg BID and Mirtazapine 30 mg qhs  -Behavioral Health Consult    Hypothyroidism  -Continue Synthroid 137 mcg daily    DVTppx: Lovenox 76 yo female with pmhx hypothyroidism, dementia with behavioral disturbance presenting to the ED with aggressive behavior per daughter.     New Onset A. Fib  -Admit to medicine with telemetry monitoring  -EKG showing a. fib @ 112 bpm, no prior cardiac history reported  -Trop neg x 1, trend trop/CK x 3  -HR dropped to 74 without medication, hold off on Beta-blocker for now and monitor closely  -May use Lopressor 5 mg IVP for HR > 130  -[id number] 3 (+2 for age, +1 for gender), patient has already converted back to sinus and is a fall risk given progressive dementia; will start heparin gtt per cardiology recommendations but will have to discuss risk/benefit with family in the am regarding long term AC  -TFTs wnl, check A1c/FLP in am  -Check TTE  -Monitor electrolytes maintain Mg > 2.0 and K > 4.0 for optimal arrhythmia suppression    Dementia with Behavioral Disturbance  -Patient seen here 1 month ago and sent to [place] for inpatient treatment, daughter now reporting similar symptoms  -Continue Haldol 1 mg BID and Mirtazapine 30 mg qhs  -Behavioral Health Consult    Hypothyroidism  -Continue Synthroid 137 mcg daily    DVTppx: Lovenox      **Med rec obtained from Dr. Kothari, please confirm with daughter in am**
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
## 2022-09-17 NOTE — H&P ADULT - NSICDXFAMILYHX_GEN_ALL_CORE_FT
FAMILY HISTORY:  Father  Still living? Unknown  FHx: hypothyroidism, Age at diagnosis: Age Unknown
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
## 2022-09-18 LAB
ANION GAP SERPL CALC-SCNC: 11 MMOL/L — SIGNIFICANT CHANGE UP (ref 5–17)
BUN SERPL-MCNC: 27.2 MG/DL — HIGH (ref 8–20)
CALCIUM SERPL-MCNC: 8.6 MG/DL — SIGNIFICANT CHANGE UP (ref 8.4–10.5)
CHLORIDE SERPL-SCNC: 104 MMOL/L — SIGNIFICANT CHANGE UP (ref 98–107)
CO2 SERPL-SCNC: 25 MMOL/L — SIGNIFICANT CHANGE UP (ref 22–29)
CREAT SERPL-MCNC: 0.96 MG/DL — SIGNIFICANT CHANGE UP (ref 0.5–1.3)
EGFR: 62 ML/MIN/1.73M2 — SIGNIFICANT CHANGE UP
GLUCOSE SERPL-MCNC: 96 MG/DL — SIGNIFICANT CHANGE UP (ref 70–99)
HCT VFR BLD CALC: 35.6 % — SIGNIFICANT CHANGE UP (ref 34.5–45)
HCV AB S/CO SERPL IA: 0.08 S/CO — SIGNIFICANT CHANGE UP (ref 0–0.99)
HCV AB SERPL-IMP: SIGNIFICANT CHANGE UP
HGB BLD-MCNC: 11.4 G/DL — LOW (ref 11.5–15.5)
MCHC RBC-ENTMCNC: 30.2 PG — SIGNIFICANT CHANGE UP (ref 27–34)
MCHC RBC-ENTMCNC: 32 GM/DL — SIGNIFICANT CHANGE UP (ref 32–36)
MCV RBC AUTO: 94.2 FL — SIGNIFICANT CHANGE UP (ref 80–100)
PLATELET # BLD AUTO: 238 K/UL — SIGNIFICANT CHANGE UP (ref 150–400)
POTASSIUM SERPL-MCNC: 4 MMOL/L — SIGNIFICANT CHANGE UP (ref 3.5–5.3)
POTASSIUM SERPL-SCNC: 4 MMOL/L — SIGNIFICANT CHANGE UP (ref 3.5–5.3)
RBC # BLD: 3.78 M/UL — LOW (ref 3.8–5.2)
RBC # FLD: 12.6 % — SIGNIFICANT CHANGE UP (ref 10.3–14.5)
SODIUM SERPL-SCNC: 140 MMOL/L — SIGNIFICANT CHANGE UP (ref 135–145)
WBC # BLD: 5.71 K/UL — SIGNIFICANT CHANGE UP (ref 3.8–10.5)
WBC # FLD AUTO: 5.71 K/UL — SIGNIFICANT CHANGE UP (ref 3.8–10.5)

## 2022-09-18 PROCEDURE — 99232 SBSQ HOSP IP/OBS MODERATE 35: CPT

## 2022-09-18 RX ADMIN — Medication 137 MICROGRAM(S): at 05:51

## 2022-09-18 RX ADMIN — APIXABAN 5 MILLIGRAM(S): 2.5 TABLET, FILM COATED ORAL at 20:39

## 2022-09-18 RX ADMIN — APIXABAN 5 MILLIGRAM(S): 2.5 TABLET, FILM COATED ORAL at 09:45

## 2022-09-18 NOTE — PROGRESS NOTE ADULT - ASSESSMENT
78 yo female with pmhx hypothyroidism, dementia with behavioral disturbance presenting to the ED with aggressive behavior per daughter.     New Onset A. Fib  NSR at this time  -Trop neg x 1, trend trop/CK x 3  -HR dropped to 74 without medication, hold off on Beta-blocker for now and monitor closely  -May use Lopressor 5 mg IVP for HR > 130  -[id number] 3 (+2 for age, +1 for gender), patient has already converted back to sinus and is a fall risk given progressive dementia; will start heparin gtt per cardiology recommendations but will have to discuss risk/benefit with family in the am regarding long term AC  -TFTs wnl, check A1c/FLP in am  -TTE as above  -Monitor electrolytes maintain Mg > 2.0 and K > 4.0 for optimal arrhythmia suppression    Dementia with Behavioral Disturbance  -Patient seen here 1 month ago and sent to [place] for inpatient treatment, daughter now reporting similar symptoms  -Continue Haldol 1 mg BID and Mirtazapine 30 mg qhs  -Behavioral Health Consult    Hypothyroidism  -Continue Synthroid 137 mcg daily    Healthcare Maintenance  DVTppx: Lovenox  Diet: DASH  Dispo: Acutely ill

## 2022-09-18 NOTE — PROGRESS NOTE ADULT - SUBJECTIVE AND OBJECTIVE BOX
[facility] Division of Hospital Medicine    Chief Complaint:      SUBJECTIVE / OVERNIGHT EVENTS:  Admitted overnight, seen at bedside in NAD. AAOx1-2. Improvement in mental status, pt still hypomanic. Only obtainable ROS is as follows - Denies HA, CP, SOB, Abd Pain, Fevers, Dizziness    MEDICATIONS  (STANDING):  apixaban 5 milliGRAM(s) Oral every 12 hours  haloperidol     Tablet 1 milliGRAM(s) Oral two times a day  levothyroxine 137 MICROGram(s) Oral daily  metoprolol succinate ER 25 milliGRAM(s) Oral daily  mirtazapine 30 milliGRAM(s) Oral at bedtime  oxybutynin 5 milliGRAM(s) Oral daily    MEDICATIONS  (PRN):  acetaminophen     Tablet .. 650 milliGRAM(s) Oral every 6 hours PRN Temp greater or equal to 38C (100.4F), Mild Pain (1 - 3)  aluminum hydroxide/magnesium hydroxide/simethicone Suspension 30 milliLiter(s) Oral every 4 hours PRN Dyspepsia  melatonin 3 milliGRAM(s) Oral at bedtime PRN Insomnia  ondansetron Injectable 4 milliGRAM(s) IV Push every 8 hours PRN Nausea and/or Vomiting        I&O's Summary      PHYSICAL EXAM:  Vital Signs Last 24 Hrs  T(C): 36.4 (18 Sep 2022 08:22), Max: 36.8 (17 Sep 2022 11:25)  T(F): 97.6 (18 Sep 2022 08:22), Max: 98.3 (17 Sep 2022 11:25)  HR: 70 (18 Sep 2022 08:22) (62 - 70)  BP: 139/87 (18 Sep 2022 08:22) (107/67 - 139/87)  BP(mean): --  RR: 18 (18 Sep 2022 08:22) (18 - 20)  SpO2: 98% (18 Sep 2022 08:22) (97% - 99%)    Parameters below as of 18 Sep 2022 08:22  Patient On (Oxygen Delivery Method): room air            General: Age-appearing, in no acute distress  Head: Normochephalic, atraumatic  ENMT: EOMI, neck supple  Cardiovascular: +S1, S2; Regular rate and rhythm, no murmurs, rubs, gallops  Respiratory: CTA BL, no wheezes, rales, rhonchi  Gastrointestinal: Abdomen soft, non-tender, +BS in all 4 quadrants  Extremities: No clubbing, cyanosis, or edema  Vascular: 2+ pulses, cap refill < 2 seconds  Neuro: Non-focal, sensation intact BL, AAOx1-2  Musculoskeletal: Normal tone, no deformities  Skin: Warm, dry; no acute rash seen  Psych: Anxious, fast speech    LABS:                        11.4   5.71  )-----------( 238      ( 18 Sep 2022 02:30 )             35.6     09-18    140  |  104  |  27.2<H>  ----------------------------<  96  4.0   |  25.0  |  0.96    Ca    8.6      18 Sep 2022 02:30    TPro  7.3  /  Alb  4.2  /  TBili  0.2<L>  /  DBili  x   /  AST  24  /  ALT  20  /  AlkPhos  73  09-16    PT/INR - ( 16 Sep 2022 16:16 )   PT: 11.9 sec;   INR: 1.03 ratio         PTT - ( 17 Sep 2022 06:52 )  PTT:31.5 sec  CARDIAC MARKERS ( 17 Sep 2022 04:50 )  x     / <0.01 ng/mL / 197 U/L / x     / 7.2 ng/mL  CARDIAC MARKERS ( 16 Sep 2022 16:16 )  x     / <0.01 ng/mL / x     / x     / x          Urinalysis Basic - ( 17 Sep 2022 05:57 )    Color: Yellow / Appearance: Clear / S.020 / pH: x  Gluc: x / Ketone: Negative  / Bili: Negative / Urobili: Negative mg/dL   Blood: x / Protein: Negative / Nitrite: Negative   Leuk Esterase: Negative / RBC: 0-2 /HPF / WBC 0-2 /HPF   Sq Epi: x / Non Sq Epi: Occasional / Bacteria: x        CAPILLARY BLOOD GLUCOSE            RADIOLOGY & ADDITIONAL TESTS:  Results Reviewed: y  Imaging Personally Reviewed: n  Electrocardiogram Personally Reviewed: n    TTE:  Summary:   1. Left ventricular ejection fraction, by visual estimation, is 60 to   65%.   2. Normal global left ventricular systolic function.   3. Normal right ventricular size and function.   4. Mild mitral valve regurgitation.   5. Mild tricuspid regurgitation.   6. Sclerotic aortic valve with normal opening.   7. There is no evidence of pericardial effusion.

## 2022-09-19 PROCEDURE — 99232 SBSQ HOSP IP/OBS MODERATE 35: CPT

## 2022-09-19 PROCEDURE — 99233 SBSQ HOSP IP/OBS HIGH 50: CPT

## 2022-09-19 RX ORDER — INFLUENZA VIRUS VACCINE 15; 15; 15; 15 UG/.5ML; UG/.5ML; UG/.5ML; UG/.5ML
0.7 SUSPENSION INTRAMUSCULAR ONCE
Refills: 0 | Status: DISCONTINUED | OUTPATIENT
Start: 2022-09-19 | End: 2022-09-21

## 2022-09-19 RX ADMIN — APIXABAN 5 MILLIGRAM(S): 2.5 TABLET, FILM COATED ORAL at 08:54

## 2022-09-19 RX ADMIN — APIXABAN 5 MILLIGRAM(S): 2.5 TABLET, FILM COATED ORAL at 20:53

## 2022-09-19 RX ADMIN — Medication 137 MICROGRAM(S): at 05:17

## 2022-09-19 NOTE — BH CONSULTATION LIAISON PROGRESS NOTE - CURRENT MEDICATION
MEDICATIONS  (STANDING):  apixaban 5 milliGRAM(s) Oral every 12 hours  haloperidol     Tablet 1 milliGRAM(s) Oral two times a day  influenza  Vaccine (HIGH DOSE) 0.7 milliLiter(s) IntraMuscular once  levothyroxine 137 MICROGram(s) Oral daily  metoprolol succinate ER 25 milliGRAM(s) Oral daily  mirtazapine 30 milliGRAM(s) Oral at bedtime  oxybutynin 5 milliGRAM(s) Oral daily    MEDICATIONS  (PRN):  acetaminophen     Tablet .. 650 milliGRAM(s) Oral every 6 hours PRN Temp greater or equal to 38C (100.4F), Mild Pain (1 - 3)  aluminum hydroxide/magnesium hydroxide/simethicone Suspension 30 milliLiter(s) Oral every 4 hours PRN Dyspepsia  melatonin 3 milliGRAM(s) Oral at bedtime PRN Insomnia  ondansetron Injectable 4 milliGRAM(s) IV Push every 8 hours PRN Nausea and/or Vomiting

## 2022-09-19 NOTE — PROGRESS NOTE ADULT - ASSESSMENT
78 yo female with pmhx hypothyroidism, dementia with behavioral disturbance presenting to the ED with aggressive behavior per daughter.     Paroxysmal Atrial Fibrillation  NSR at this time  -May use Lopressor 5 mg IVP for HR > 130  -[id number] 3 (+2 for age, +1 for gender), patient has already converted back to sinus and is a fall risk given progressive dementia; will start heparin gtt per cardiology recommendations but will have to discuss risk/benefit with family in the am regarding long term AC  -TFTs wnl, check A1c/FLP in am  -TTE as above  -Monitor electrolytes maintain Mg > 2.0 and K > 4.0 for optimal arrhythmia suppression    Dementia with Behavioral Disturbance  -Patient seen here 1 month ago and sent to [place] for inpatient treatment, daughter now reporting similar symptoms  -Continue Haldol 1 mg BID and Mirtazapine 30 mg qhs  -As per ED BH requiring inpatient psych admission  -Behavioral Health Consult    Hypothyroidism  -Continue Synthroid 137 mcg daily    Healthcare Maintenance  DVTppx: Lovenox  Diet: DASH  Dispo: Acutely ill

## 2022-09-19 NOTE — BH CONSULTATION LIAISON PROGRESS NOTE - NSBHATTESTBILLINGAW_PSY_A_CORE
----- Message from Jae Arevalo MD sent at 3/9/2017  8:05 AM CST -----  Please inform that the strep culture was negative.     Billing in another system

## 2022-09-19 NOTE — PATIENT PROFILE ADULT - NSPROPTRIGHTBILLOFRIGHTS_GEN_A_NUR
10/10/18 HOLD PF AS STEROID RESPONDER. 11/13/19 MILD INCREASE FLUID, KETOROLAK QHS. 11/27/18 IOP IMPROVED, NO WORSENING OF CME. 2/18/20 IMPROVED. 2/27/19 NOT USING DROPS. BMI Within normal limits, continue current management. CONTINUE ON KETO TILL RUNS OUT. Does NOT APPEAR VISUALLY SIGNIFICANT. ERM does NOT APPEAR VISUALLY SIGNIFICANT. ME IMPROVED 1/10/18. My findings and recommendations are based on patient's symptoms, eye exam, diagnostic testing, and records. NON-SMOKER. No new tear/breaks/detachment seen TODAY. No retinal tears or retinal detachment seen on clinical exam today. Reviewed the signs and symptoms of retinal tear/retinal detachment and the importance of calling for prompt evaluation should there be increasing floaters, new flashing lights, or decreasing peripheral vision in either eye at any time. Observation recommended. POST SX. Recommend OBSERVATION and continued MONITORING for new tear/break/retinal detachment. Recommend OBSERVATION and continued MONITORING for progression. Recommended OBSERVATION and MONITORING for change. Recommended OBSERVATION and continued MONITORING for progression. Retina ATTACHED and doing well today. WALLED OFF AREA OF SRF NASALLY AND INFERIOR. POST LASER . Well positioned. 21-Dec-2019 03:30 patient
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
## 2022-09-19 NOTE — PROGRESS NOTE ADULT - SUBJECTIVE AND OBJECTIVE BOX
[facility] Division of Hospital Medicine    Chief Complaint:      SUBJECTIVE / OVERNIGHT EVENTS:  Admitted overnight, seen at bedside in NAD. AAOx1-2. Still hypomanic, refusing admission however requiring inpatient psych as per ED BH. Only obtainable ROS is as follows - Denies HA, CP, SOB, Abd Pain, Fevers, Dizziness    MEDICATIONS  (STANDING):  apixaban 5 milliGRAM(s) Oral every 12 hours  haloperidol     Tablet 1 milliGRAM(s) Oral two times a day  levothyroxine 137 MICROGram(s) Oral daily  metoprolol succinate ER 25 milliGRAM(s) Oral daily  mirtazapine 30 milliGRAM(s) Oral at bedtime  oxybutynin 5 milliGRAM(s) Oral daily    MEDICATIONS  (PRN):  acetaminophen     Tablet .. 650 milliGRAM(s) Oral every 6 hours PRN Temp greater or equal to 38C (100.4F), Mild Pain (1 - 3)  aluminum hydroxide/magnesium hydroxide/simethicone Suspension 30 milliLiter(s) Oral every 4 hours PRN Dyspepsia  melatonin 3 milliGRAM(s) Oral at bedtime PRN Insomnia  ondansetron Injectable 4 milliGRAM(s) IV Push every 8 hours PRN Nausea and/or Vomiting        I&O's Summary      PHYSICAL EXAM:  Vital Signs Last 24 Hrs  T(C): 36.4 (19 Sep 2022 11:12), Max: 36.9 (18 Sep 2022 16:32)  T(F): 97.5 (19 Sep 2022 11:12), Max: 98.4 (18 Sep 2022 16:32)  HR: 66 (19 Sep 2022 11:12) (61 - 79)  BP: 121/73 (19 Sep 2022 11:12) (113/71 - 158/93)  BP(mean): --  RR: 18 (19 Sep 2022 11:12) (16 - 18)  SpO2: 97% (19 Sep 2022 11:12) (97% - 100%)    Parameters below as of 19 Sep 2022 11:12  Patient On (Oxygen Delivery Method): room air        General: Age-appearing, in no acute distress  Head: Normochephalic, atraumatic  ENMT: EOMI, neck supple  Cardiovascular: +S1, S2; Regular rate and rhythm, no murmurs, rubs, gallops  Respiratory: CTA BL, no wheezes, rales, rhonchi  Gastrointestinal: Abdomen soft, non-tender, +BS in all 4 quadrants  Extremities: No clubbing, cyanosis, or edema  Vascular: 2+ pulses, cap refill < 2 seconds  Neuro: Non-focal, sensation intact BL, AAOx1-2  Musculoskeletal: Normal tone, no deformities  Skin: Warm, dry; no acute rash seen  Psych: Anxious, fast speech      LABS:                        11.4   5.71  )-----------( 238      ( 18 Sep 2022 02:30 )             35.6     09-18    140  |  104  |  27.2<H>  ----------------------------<  96  4.0   |  25.0  |  0.96    Ca    8.6      18 Sep 2022 02:30                Culture - Urine (collected 17 Sep 2022 05:57)  Source: Clean Catch Clean Catch (Midstream)  Preliminary Report (19 Sep 2022 11:20):    10,000 - 49,000 CFU/mL Gram positive organisms      CAPILLARY BLOOD GLUCOSE            RADIOLOGY & ADDITIONAL TESTS:  Results Reviewed: y  Imaging Personally Reviewed: n  Electrocardiogram Personally Reviewed: n                                           [facility] Division of Hospital Medicine    Chief Complaint:      SUBJECTIVE / OVERNIGHT EVENTS:  Seen at bedside, AAOx3, labile mood yet cooperative w/ direction. Denies HA, CP, SOB, Abd pain, Fevers, chills, dysuria. Remaining ROS neg other than mentioned.    MEDICATIONS  (STANDING):  apixaban 5 milliGRAM(s) Oral every 12 hours  haloperidol     Tablet 1 milliGRAM(s) Oral two times a day  levothyroxine 137 MICROGram(s) Oral daily  metoprolol succinate ER 25 milliGRAM(s) Oral daily  mirtazapine 30 milliGRAM(s) Oral at bedtime  oxybutynin 5 milliGRAM(s) Oral daily    MEDICATIONS  (PRN):  acetaminophen     Tablet .. 650 milliGRAM(s) Oral every 6 hours PRN Temp greater or equal to 38C (100.4F), Mild Pain (1 - 3)  aluminum hydroxide/magnesium hydroxide/simethicone Suspension 30 milliLiter(s) Oral every 4 hours PRN Dyspepsia  melatonin 3 milliGRAM(s) Oral at bedtime PRN Insomnia  ondansetron Injectable 4 milliGRAM(s) IV Push every 8 hours PRN Nausea and/or Vomiting        I&O's Summary      PHYSICAL EXAM:  Vital Signs Last 24 Hrs  T(C): 36.4 (19 Sep 2022 11:12), Max: 36.9 (18 Sep 2022 16:32)  T(F): 97.5 (19 Sep 2022 11:12), Max: 98.4 (18 Sep 2022 16:32)  HR: 66 (19 Sep 2022 11:12) (61 - 79)  BP: 121/73 (19 Sep 2022 11:12) (113/71 - 158/93)  BP(mean): --  RR: 18 (19 Sep 2022 11:12) (16 - 18)  SpO2: 97% (19 Sep 2022 11:12) (97% - 100%)    Parameters below as of 19 Sep 2022 11:12  Patient On (Oxygen Delivery Method): room air        General: Age-appearing, in no acute distress  Head: Normochephalic, atraumatic  ENMT: EOMI, neck supple  Cardiovascular: +S1, S2; Regular rate and rhythm, no murmurs, rubs, gallops  Respiratory: CTA BL, no wheezes, rales, rhonchi  Gastrointestinal: Abdomen soft, non-tender, +BS in all 4 quadrants  Extremities: No clubbing, cyanosis, or edema  Vascular: 2+ pulses, cap refill < 2 seconds  Neuro: Non-focal, sensation intact BL, AAOx1-2  Musculoskeletal: Normal tone, no deformities  Skin: Warm, dry; no acute rash seen  Psych: Anxious, fast speech      LABS:                        11.4   5.71  )-----------( 238      ( 18 Sep 2022 02:30 )             35.6     09-18    140  |  104  |  27.2<H>  ----------------------------<  96  4.0   |  25.0  |  0.96    Ca    8.6      18 Sep 2022 02:30                Culture - Urine (collected 17 Sep 2022 05:57)  Source: Clean Catch Clean Catch (Midstream)  Preliminary Report (19 Sep 2022 11:20):    10,000 - 49,000 CFU/mL Gram positive organisms      CAPILLARY BLOOD GLUCOSE            RADIOLOGY & ADDITIONAL TESTS:  Results Reviewed: y  Imaging Personally Reviewed: n  Electrocardiogram Personally Reviewed: n
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
## 2022-09-19 NOTE — BH CONSULTATION LIAISON PROGRESS NOTE - NSBHCHARTREVIEWLAB_PSY_A_CORE FT
Basic Metabolic Panel in AM (09.18.22 @ 02:30)    Sodium, Serum: 140 mmol/L    Potassium, Serum: 4.0 mmol/L    Chloride, Serum: 104 mmol/L    Carbon Dioxide, Serum: 25.0 mmol/L    Anion Gap, Serum: 11 mmol/L    Blood Urea Nitrogen, Serum: 27.2 mg/dL    Creatinine, Serum: 0.96 mg/dL    Glucose, Serum: 96 mg/dL    Calcium, Total Serum: 8.6: Prior Reference Range of 8.6 – 10.2 was amended as of 7/26/2022 to 8.4 –  10.5. mg/dL    eGFR: 62: The estimated glomerular filtration rate (eGFR) is calculated using the  2021 CKD-EPI creatinine equation, which does not have a coefficient for  race and is validated in individuals 18 years of age and older (N Engl J  Med 2021; 385:9098-3005). Creatinine-based eGFR may be inaccurate in  various situations including but not limited to extremes of muscle mass,  altered dietary protein intake, or medications that affect renal tubular  creatinine secretion. mL/min/1.73m2

## 2022-09-19 NOTE — BH CONSULTATION LIAISON PROGRESS NOTE - NSBHCHARTREVIEWVS_PSY_A_CORE FT
Vital Signs Last 24 Hrs  T(C): 36.6 (19 Sep 2022 16:27), Max: 36.7 (18 Sep 2022 19:35)  T(F): 97.9 (19 Sep 2022 16:27), Max: 98.1 (19 Sep 2022 00:57)  HR: 83 (19 Sep 2022 16:27) (61 - 83)  BP: 163/95 (19 Sep 2022 16:27) (113/71 - 163/95)  BP(mean): --  RR: 18 (19 Sep 2022 16:27) (16 - 18)  SpO2: 97% (19 Sep 2022 16:27) (97% - 100%)    Parameters below as of 19 Sep 2022 16:27  Patient On (Oxygen Delivery Method): room air

## 2022-09-19 NOTE — BH CONSULTATION LIAISON PROGRESS NOTE - NSBHCHARTREVIEWINVESTIGATE_PSY_A_CORE FT
< from: 12 Lead ECG (09.17.22 @ 14:59) >      Ventricular Rate 67 BPM    Atrial Rate 67 BPM    P-R Interval 214 ms    QRS Duration 76 ms    Q-T Interval 448 ms    QTC Calculation(Bazett) 473 ms    P Axis 54 degrees    R Axis 37 degrees    T Axis 53 degrees    Diagnosis Line Sinus rhythm with 1st degree A-V block  Otherwise normal ECG    Confirmed by GABRIELLE UNDERWOOD ([id number]) on 9/18/2022 8:05:49 AM    < end of copied text >

## 2022-09-20 ENCOUNTER — TRANSCRIPTION ENCOUNTER (OUTPATIENT)
Age: 77
End: 2022-09-20

## 2022-09-20 LAB
-  DAPTOMYCIN: SIGNIFICANT CHANGE UP
-  LEVOFLOXACIN: SIGNIFICANT CHANGE UP
-  LINEZOLID: SIGNIFICANT CHANGE UP
-  PENICILLIN: SIGNIFICANT CHANGE UP
-  VANCOMYCIN: SIGNIFICANT CHANGE UP
CULTURE RESULTS: SIGNIFICANT CHANGE UP
METHOD TYPE: SIGNIFICANT CHANGE UP
ORGANISM # SPEC MICROSCOPIC CNT: SIGNIFICANT CHANGE UP
ORGANISM # SPEC MICROSCOPIC CNT: SIGNIFICANT CHANGE UP
SPECIMEN SOURCE: SIGNIFICANT CHANGE UP

## 2022-09-20 PROCEDURE — 99232 SBSQ HOSP IP/OBS MODERATE 35: CPT

## 2022-09-20 RX ORDER — ONDANSETRON 8 MG/1
4 TABLET, FILM COATED ORAL
Qty: 0 | Refills: 0 | DISCHARGE
Start: 2022-09-20

## 2022-09-20 RX ORDER — LEVOTHYROXINE SODIUM 125 MCG
1 TABLET ORAL
Qty: 0 | Refills: 0 | DISCHARGE
Start: 2022-09-20

## 2022-09-20 RX ORDER — HALOPERIDOL DECANOATE 100 MG/ML
1 INJECTION INTRAMUSCULAR
Qty: 0 | Refills: 0 | DISCHARGE
Start: 2022-09-20

## 2022-09-20 RX ORDER — OXYBUTYNIN CHLORIDE 5 MG
1 TABLET ORAL
Qty: 0 | Refills: 0 | DISCHARGE

## 2022-09-20 RX ORDER — ACETAMINOPHEN 500 MG
2 TABLET ORAL
Qty: 0 | Refills: 0 | DISCHARGE
Start: 2022-09-20

## 2022-09-20 RX ORDER — APIXABAN 2.5 MG/1
1 TABLET, FILM COATED ORAL
Qty: 0 | Refills: 0 | DISCHARGE
Start: 2022-09-20

## 2022-09-20 RX ORDER — METOPROLOL TARTRATE 50 MG
1 TABLET ORAL
Qty: 0 | Refills: 0 | DISCHARGE
Start: 2022-09-20

## 2022-09-20 RX ORDER — LANOLIN ALCOHOL/MO/W.PET/CERES
1 CREAM (GRAM) TOPICAL
Qty: 0 | Refills: 0 | DISCHARGE
Start: 2022-09-20

## 2022-09-20 RX ORDER — MIRTAZAPINE 45 MG/1
1 TABLET, ORALLY DISINTEGRATING ORAL
Qty: 0 | Refills: 0 | DISCHARGE
Start: 2022-09-20

## 2022-09-20 RX ORDER — HALOPERIDOL DECANOATE 100 MG/ML
1 INJECTION INTRAMUSCULAR
Qty: 0 | Refills: 0 | DISCHARGE

## 2022-09-20 RX ORDER — OXYBUTYNIN CHLORIDE 5 MG
1 TABLET ORAL
Qty: 0 | Refills: 0 | DISCHARGE
Start: 2022-09-20

## 2022-09-20 RX ORDER — LEVOTHYROXINE SODIUM 125 MCG
1 TABLET ORAL
Qty: 0 | Refills: 0 | DISCHARGE

## 2022-09-20 RX ORDER — MIRTAZAPINE 45 MG/1
1 TABLET, ORALLY DISINTEGRATING ORAL
Qty: 0 | Refills: 0 | DISCHARGE

## 2022-09-20 RX ADMIN — APIXABAN 5 MILLIGRAM(S): 2.5 TABLET, FILM COATED ORAL at 20:51

## 2022-09-20 RX ADMIN — APIXABAN 5 MILLIGRAM(S): 2.5 TABLET, FILM COATED ORAL at 08:44

## 2022-09-20 RX ADMIN — Medication 137 MICROGRAM(S): at 05:41

## 2022-09-20 NOTE — DISCHARGE NOTE PROVIDER - NSDCMRMEDTOKEN_GEN_ALL_CORE_FT
acetaminophen 325 mg oral tablet: 2 tab(s) orally every 6 hours, As needed, Temp greater or equal to 38C (100.4F), Mild Pain (1 - 3)  aluminum hydroxide-magnesium hydroxide 200 mg-200 mg/5 mL oral suspension: 30 milliliter(s) orally every 4 hours, As needed, Dyspepsia  apixaban 5 mg oral tablet: 1 tab(s) orally every 12 hours  haloperidol 1 mg oral tablet: 1 tab(s) orally 2 times a day  levothyroxine 137 mcg (0.137 mg) oral tablet: 1 tab(s) orally once a day  melatonin 3 mg oral tablet: 1 tab(s) orally once a day (at bedtime), As needed, Insomnia  metoprolol succinate 25 mg oral tablet, extended release: 1 tab(s) orally once a day  mirtazapine 30 mg oral tablet: 1 tab(s) orally once a day (at bedtime)  ondansetron 2 mg/mL injectable solution: 4 milligram(s) injectable every 8 hours, As Needed  oxybutynin 5 mg oral tablet: 1 tab(s) orally once a day

## 2022-09-20 NOTE — PROGRESS NOTE ADULT - ASSESSMENT
76 yo female with pmhx hypothyroidism, dementia with behavioral disturbance presenting to the ED with aggressive behavior per daughter.     Paroxysmal Atrial Fibrillation  NSR at this time  -May use Lopressor 5 mg IVP for HR > 130  -[id number] 3 (+2 for age, +1 for gender), patient has already converted back to sinus and is a fall risk given progressive dementia; will start heparin gtt per cardiology recommendations but will have to discuss risk/benefit with family in the am regarding long term AC  -TFTs wnl, check A1c/FLP in am  -TTE as above  -Monitor electrolytes maintain Mg > 2.0 and K > 4.0 for optimal arrhythmia suppression    Dementia with Behavioral Disturbance  -Patient seen here 1 month ago and sent to [place] for inpatient treatment, daughter now reporting similar symptoms  -Continue Haldol 1 mg BID and Mirtazapine 30 mg qhs  -As per ED BH requiring inpatient psych admission  -Behavioral Health Consult    Hypothyroidism  -Continue Synthroid 137 mcg daily    Healthcare Maintenance  DVTppx: Lovenox  Diet: DASH  Dispo: Medically cleared for DC to inpatient psych 78 yo female with pmhx hypothyroidism, dementia with behavioral disturbance presenting to the ED with aggressive behavior per daughter.     Paroxysmal Atrial Fibrillation  NSR at this time  -May use Lopressor 5 mg IVP for HR > 130  -[id number] 3 (+2 for age, +1 for gender), c/w Eliquis 5mg Q12  -TFTs wnl, check A1c/FLP in am  -Monitor electrolytes maintain Mg > 2.0 and K > 4.0 for optimal arrhythmia suppression    Dementia with Behavioral Disturbance  -Patient seen here 1 month ago and sent to [place] for inpatient treatment, daughter now reporting similar symptoms  -Continue Haldol 1 mg BID and Mirtazapine 30 mg qhs  -As per ED BH requiring inpatient psych admission  -Behavioral Health Consult    Hypothyroidism  -Continue Synthroid 137 mcg daily    Healthcare Maintenance  DVTppx: Eliquis  Diet: DASH  Dispo: Medically cleared for DC to inpatient psych
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
## 2022-09-20 NOTE — PROGRESS NOTE ADULT - SUBJECTIVE AND OBJECTIVE BOX
[facility] Division of Hospital Medicine    Chief Complaint:      SUBJECTIVE / OVERNIGHT EVENTS:  No events overnight, pt seen at bedside, in NAD, cooperative w/ direction, expresses desire to go home however no safe dispo plan, needs inpatient psych as per . Patient denies chest pain, SOB, abd pain, N/V, fever, chills, dysuria or any other complaints. All remainder ROS negative.     MEDICATIONS  (STANDING):  apixaban 5 milliGRAM(s) Oral every 12 hours  haloperidol     Tablet 1 milliGRAM(s) Oral two times a day  influenza  Vaccine (HIGH DOSE) 0.7 milliLiter(s) IntraMuscular once  levothyroxine 137 MICROGram(s) Oral daily  metoprolol succinate ER 25 milliGRAM(s) Oral daily  mirtazapine 30 milliGRAM(s) Oral at bedtime  oxybutynin 5 milliGRAM(s) Oral daily    MEDICATIONS  (PRN):  acetaminophen     Tablet .. 650 milliGRAM(s) Oral every 6 hours PRN Temp greater or equal to 38C (100.4F), Mild Pain (1 - 3)  aluminum hydroxide/magnesium hydroxide/simethicone Suspension 30 milliLiter(s) Oral every 4 hours PRN Dyspepsia  melatonin 3 milliGRAM(s) Oral at bedtime PRN Insomnia  ondansetron Injectable 4 milliGRAM(s) IV Push every 8 hours PRN Nausea and/or Vomiting        I&O's Summary      PHYSICAL EXAM:  Vital Signs Last 24 Hrs  T(C): 36.4 (20 Sep 2022 10:39), Max: 37.3 (19 Sep 2022 20:40)  T(F): 97.6 (20 Sep 2022 10:39), Max: 99.2 (19 Sep 2022 20:40)  HR: 80 (20 Sep 2022 10:39) (66 - 83)  BP: 160/85 (20 Sep 2022 10:39) (115/75 - 163/95)  BP(mean): --  RR: 18 (20 Sep 2022 10:39) (18 - 18)  SpO2: 96% (20 Sep 2022 10:39) (95% - 98%)    Parameters below as of 20 Sep 2022 10:39  Patient On (Oxygen Delivery Method): room air      General: Age-appearing, in no acute distress  Head: Normochephalic, atraumatic  ENMT: EOMI, neck supple  Cardiovascular: +S1, S2; Regular rate and rhythm, no murmurs, rubs, gallops  Respiratory: CTA BL, no wheezes, rales, rhonchi  Gastrointestinal: Abdomen soft, non-tender, +BS in all 4 quadrants  Extremities: No clubbing, cyanosis, or edema  Vascular: 2+ pulses, cap refill < 2 seconds  Neuro: Non-focal, sensation intact BL, AAOx1-2  Musculoskeletal: Normal tone, no deformities  Skin: Warm, dry; no acute rash seen  Psych: Anxious, fast speech    LABS:        CAPILLARY BLOOD GLUCOSE    RADIOLOGY & ADDITIONAL TESTS:  Results Reviewed: y  Imaging Personally Reviewed: n  Electrocardiogram Personally Reviewed: n

## 2022-09-20 NOTE — DISCHARGE NOTE PROVIDER - NSDCCPCAREPLAN_GEN_ALL_CORE_FT
PRINCIPAL DISCHARGE DIAGNOSIS  Diagnosis: New onset atrial fibrillation  Assessment and Plan of Treatment: Rate and Rhythm controlled w/ BB. Continue w/ BB medication. Follow up w/ PMD and Cardiology outpatient w/i 2 weeks of DC      SECONDARY DISCHARGE DIAGNOSES  Diagnosis: Schizoaffective disorder, bipolar type  Assessment and Plan of Treatment: Schizoaffective hx requiring haldol and inpatient psych admission. DC to geriatric psych at [facility]. Follow up w/ psych outpatient. needs facility long term as patient no longer has home to return to.

## 2022-09-21 ENCOUNTER — TRANSCRIPTION ENCOUNTER (OUTPATIENT)
Age: 77
End: 2022-09-21

## 2022-09-21 VITALS
HEART RATE: 75 BPM | OXYGEN SATURATION: 97 % | SYSTOLIC BLOOD PRESSURE: 132 MMHG | DIASTOLIC BLOOD PRESSURE: 85 MMHG | RESPIRATION RATE: 18 BRPM | TEMPERATURE: 98 F

## 2022-09-21 LAB
ANION GAP SERPL CALC-SCNC: 9 MMOL/L — SIGNIFICANT CHANGE UP (ref 5–17)
BUN SERPL-MCNC: 18.8 MG/DL — SIGNIFICANT CHANGE UP (ref 8–20)
CALCIUM SERPL-MCNC: 9.1 MG/DL — SIGNIFICANT CHANGE UP (ref 8.4–10.5)
CHLORIDE SERPL-SCNC: 103 MMOL/L — SIGNIFICANT CHANGE UP (ref 98–107)
CO2 SERPL-SCNC: 26 MMOL/L — SIGNIFICANT CHANGE UP (ref 22–29)
CREAT SERPL-MCNC: 0.64 MG/DL — SIGNIFICANT CHANGE UP (ref 0.5–1.3)
EGFR: 92 ML/MIN/1.73M2 — SIGNIFICANT CHANGE UP
GLUCOSE SERPL-MCNC: 93 MG/DL — SIGNIFICANT CHANGE UP (ref 70–99)
HCT VFR BLD CALC: 39.7 % — SIGNIFICANT CHANGE UP (ref 34.5–45)
HGB BLD-MCNC: 12.8 G/DL — SIGNIFICANT CHANGE UP (ref 11.5–15.5)
MCHC RBC-ENTMCNC: 30.7 PG — SIGNIFICANT CHANGE UP (ref 27–34)
MCHC RBC-ENTMCNC: 32.2 GM/DL — SIGNIFICANT CHANGE UP (ref 32–36)
MCV RBC AUTO: 95.2 FL — SIGNIFICANT CHANGE UP (ref 80–100)
PLATELET # BLD AUTO: 274 K/UL — SIGNIFICANT CHANGE UP (ref 150–400)
POTASSIUM SERPL-MCNC: 5.1 MMOL/L — SIGNIFICANT CHANGE UP (ref 3.5–5.3)
POTASSIUM SERPL-SCNC: 5.1 MMOL/L — SIGNIFICANT CHANGE UP (ref 3.5–5.3)
RBC # BLD: 4.17 M/UL — SIGNIFICANT CHANGE UP (ref 3.8–5.2)
RBC # FLD: 12.4 % — SIGNIFICANT CHANGE UP (ref 10.3–14.5)
SARS-COV-2 RNA SPEC QL NAA+PROBE: SIGNIFICANT CHANGE UP
SODIUM SERPL-SCNC: 138 MMOL/L — SIGNIFICANT CHANGE UP (ref 135–145)
WBC # BLD: 5.27 K/UL — SIGNIFICANT CHANGE UP (ref 3.8–10.5)
WBC # FLD AUTO: 5.27 K/UL — SIGNIFICANT CHANGE UP (ref 3.8–10.5)

## 2022-09-21 PROCEDURE — 36415 COLL VENOUS BLD VENIPUNCTURE: CPT

## 2022-09-21 PROCEDURE — 82550 ASSAY OF CK (CPK): CPT

## 2022-09-21 PROCEDURE — U0005: CPT

## 2022-09-21 PROCEDURE — 0225U NFCT DS DNA&RNA 21 SARSCOV2: CPT

## 2022-09-21 PROCEDURE — 85610 PROTHROMBIN TIME: CPT

## 2022-09-21 PROCEDURE — U0003: CPT

## 2022-09-21 PROCEDURE — 85730 THROMBOPLASTIN TIME PARTIAL: CPT

## 2022-09-21 PROCEDURE — 82553 CREATINE MB FRACTION: CPT

## 2022-09-21 PROCEDURE — 85025 COMPLETE CBC W/AUTO DIFF WBC: CPT

## 2022-09-21 PROCEDURE — 84443 ASSAY THYROID STIM HORMONE: CPT

## 2022-09-21 PROCEDURE — 80061 LIPID PANEL: CPT

## 2022-09-21 PROCEDURE — 99239 HOSP IP/OBS DSCHRG MGMT >30: CPT

## 2022-09-21 PROCEDURE — 87086 URINE CULTURE/COLONY COUNT: CPT

## 2022-09-21 PROCEDURE — 86803 HEPATITIS C AB TEST: CPT

## 2022-09-21 PROCEDURE — 99285 EMERGENCY DEPT VISIT HI MDM: CPT

## 2022-09-21 PROCEDURE — 85027 COMPLETE CBC AUTOMATED: CPT

## 2022-09-21 PROCEDURE — 87186 SC STD MICRODIL/AGAR DIL: CPT

## 2022-09-21 PROCEDURE — 93010 ELECTROCARDIOGRAM REPORT: CPT

## 2022-09-21 PROCEDURE — 81001 URINALYSIS AUTO W/SCOPE: CPT

## 2022-09-21 PROCEDURE — 71045 X-RAY EXAM CHEST 1 VIEW: CPT

## 2022-09-21 PROCEDURE — 80053 COMPREHEN METABOLIC PANEL: CPT

## 2022-09-21 PROCEDURE — 84436 ASSAY OF TOTAL THYROXINE: CPT

## 2022-09-21 PROCEDURE — C8929: CPT

## 2022-09-21 PROCEDURE — 83036 HEMOGLOBIN GLYCOSYLATED A1C: CPT

## 2022-09-21 PROCEDURE — 80048 BASIC METABOLIC PNL TOTAL CA: CPT

## 2022-09-21 PROCEDURE — 84484 ASSAY OF TROPONIN QUANT: CPT

## 2022-09-21 PROCEDURE — 93005 ELECTROCARDIOGRAM TRACING: CPT

## 2022-09-21 PROCEDURE — 84480 ASSAY TRIIODOTHYRONINE (T3): CPT

## 2022-09-21 RX ADMIN — Medication 137 MICROGRAM(S): at 05:28

## 2022-09-21 RX ADMIN — APIXABAN 5 MILLIGRAM(S): 2.5 TABLET, FILM COATED ORAL at 09:01

## 2022-09-21 NOTE — DISCHARGE NOTE NURSING/CASE MANAGEMENT/SOCIAL WORK - NSDCPEFALRISK_GEN_ALL_CORE
For information on Fall & Injury Prevention, visit: [email or web address] OR  [email or web address] OR  https://www.cdc.gov/steadi/patient.html

## 2022-09-21 NOTE — DISCHARGE NOTE NURSING/CASE MANAGEMENT/SOCIAL WORK - PATIENT PORTAL LINK FT
You can access the FollowMyHealth Patient Portal offered by [facility] by registering at the following website: [email or web address]. By joining [employer]’s FollowMyHealth portal, you will also be able to view your health information using other applications (apps) compatible with our system.

## 2022-10-04 PROBLEM — E03.9 HYPOTHYROIDISM, UNSPECIFIED: Chronic | Status: ACTIVE | Noted: 2022-09-17

## 2022-10-05 ENCOUNTER — APPOINTMENT (OUTPATIENT)
Dept: FAMILY MEDICINE | Facility: CLINIC | Age: 77
End: 2022-10-05

## 2022-10-05 VITALS
BODY MASS INDEX: 25.4 KG/M2 | SYSTOLIC BLOOD PRESSURE: 173 MMHG | HEART RATE: 81 BPM | WEIGHT: 138 LBS | RESPIRATION RATE: 16 BRPM | DIASTOLIC BLOOD PRESSURE: 101 MMHG | HEIGHT: 62 IN

## 2022-10-05 DIAGNOSIS — Z86.59 PERSONAL HISTORY OF OTHER MENTAL AND BEHAVIORAL DISORDERS: ICD-10-CM

## 2022-10-05 DIAGNOSIS — R05.9 COUGH, UNSPECIFIED: ICD-10-CM

## 2022-10-05 PROCEDURE — 99215 OFFICE O/P EST HI 40 MIN: CPT

## 2022-10-05 RX ORDER — MIRTAZAPINE 30 MG/1
30 TABLET, FILM COATED ORAL
Qty: 30 | Refills: 2 | Status: DISCONTINUED | COMMUNITY
Start: 2021-08-27 | End: 2022-10-05

## 2022-10-05 RX ORDER — DICLOFENAC SODIUM 1% 10 MG/G
1 GEL TOPICAL DAILY
Qty: 100 | Refills: 0 | Status: DISCONTINUED | COMMUNITY
Start: 2021-08-27 | End: 2022-10-05

## 2022-10-05 RX ORDER — DICLOFENAC SODIUM 1% 10 MG/G
1 GEL TOPICAL
Qty: 1 | Refills: 0 | Status: DISCONTINUED | COMMUNITY
Start: 2021-10-22 | End: 2022-10-05

## 2022-10-05 RX ORDER — ATORVASTATIN CALCIUM 40 MG/1
40 TABLET, FILM COATED ORAL DAILY
Qty: 30 | Refills: 2 | Status: DISCONTINUED | COMMUNITY
Start: 2021-05-07 | End: 2022-10-05

## 2022-10-05 RX ORDER — ACETAMINOPHEN 325 MG/1
325 TABLET, FILM COATED ORAL EVERY 6 HOURS
Qty: 24 | Refills: 0 | Status: DISCONTINUED | COMMUNITY
Start: 2021-10-22 | End: 2022-10-05

## 2022-10-05 RX ORDER — LEVOTHYROXINE SODIUM 0.14 MG/1
137 TABLET ORAL
Refills: 0 | Status: ACTIVE | COMMUNITY

## 2022-10-05 RX ORDER — MELOXICAM 7.5 MG/1
7.5 TABLET ORAL
Qty: 15 | Refills: 0 | Status: DISCONTINUED | COMMUNITY
End: 2022-10-05

## 2022-10-05 NOTE — HEALTH RISK ASSESSMENT
[0] : 2) Feeling down, depressed, or hopeless: Not at all (0) [PHQ-2 Negative - No further assessment needed] : PHQ-2 Negative - No further assessment needed [[id number]] : 0

## 2022-10-05 NOTE — HISTORY OF PRESENT ILLNESS
[FreeTextEntry1] : follow up [de-identified] : Ms. JULIA ALCANTARA is a pleasant 77 year old female with PMH of schizophrenia, depression, hypothyroidism s/p thyroid ablation due to hyperthyroidism? managed by endocrinology, AMIE who comes in to the office with her daughter Arthur for medical condition follow up.\par Patient was at [facility], diagnosed with bipolar disorder an other unspecified psych disorder. She had a relapse 1 monthly ago, was at [facility], diagnosed with A-fib, started on Eliquis and was referred to [facility] again.\par She is seeing a psychiatrist, was referred to [facility] but she is looking for another options yet.\par She complaints of a mild cough\par She sees a  from [facility]: Deedee [phone]\par \par She saw GI for colon cancer screen last year\par Has declined mammogram multiple times \par \par Endocrinology: Dr Stefany Gutierrez in [facility] [phone]\par Cardio: Birgit Miller\par

## 2022-10-05 NOTE — ASSESSMENT
[FreeTextEntry1] : \par HLD\par Now on simvastatin 10 mg QHS from the hospital\par On Ezetimibe\par Checking a lipid panel\par \par Cough\par Unclear if viral vs allergic\par Checking an RVP\par -No signs of respiratory distress at this time. \par -Giving her a few doses of benzonatate which patient reports that helps\par -I advised the patient to take acetaminophen/ibuprofen with meals and as needed for pain/fever,  and increase the oral hydration up to 1 gallon of fluids/day unless there is a contraindication to do so. Also to monitor for worsening symptoms including but not limited to uncontrolled fever, shortness of breath, worsening sore throat, weakness and to go to the Emergency department if the symptoms worsen. \par \par HTN\par Start amlodipine 5 mg QD\par Hypotension precautions discussed\par Increased hydration advised\par Avoid salt\par Check blood pressures at home\par \par Schizoaffective disorder\par No suicidal/homicidal thoughts\par Good support from her daughter\par Recently discharged from [facility]\par Pending to see psychiatry outpatient, giving her a referral\par On Haldol\par Has a .\par \par Osteoporosis\par Last DEXA: 08/26/2021\par Tried some unspecified oral medication in the past, once/month? COuld have been Ibandronate? But I don't have those records\par Saw rheum Dr Tracy, pending to follow up\par \par A-fib\par Continue, metoprolol\par Continue Eliquis\par Referring to cardio, was seen by Dr Miller at [facility]\par \par Hypothyroidism\par Check TSH\par On levothyroxine 137 mcg QAM . managed by endocrinology Dr Gutierrez, \par \par Had declined all vaccinations multiple times.\par \par This was an extensive visit that included review of previous labs and specialists notes before and after the face to face encounter \par Return to care: within 1-2 weeks for HTN follow up  follow up or earlier if needed\par \par Call or return for any questions

## 2022-10-06 ENCOUNTER — NON-APPOINTMENT (OUTPATIENT)
Age: 77
End: 2022-10-06

## 2022-10-06 LAB
CHOLEST SERPL-MCNC: 258 MG/DL
HDLC SERPL-MCNC: 65 MG/DL
LDLC SERPL CALC-MCNC: 171 MG/DL
NONHDLC SERPL-MCNC: 193 MG/DL
RAPID RVP RESULT: DETECTED
SARS-COV-2 RNA PNL RESP NAA+PROBE: DETECTED
TRIGL SERPL-MCNC: 112 MG/DL
TSH SERPL-ACNC: 1.23 UIU/ML

## 2022-10-19 ENCOUNTER — APPOINTMENT (OUTPATIENT)
Dept: FAMILY MEDICINE | Facility: CLINIC | Age: 77
End: 2022-10-19

## 2022-10-19 VITALS
RESPIRATION RATE: 16 BRPM | DIASTOLIC BLOOD PRESSURE: 87 MMHG | HEIGHT: 62 IN | HEART RATE: 79 BPM | SYSTOLIC BLOOD PRESSURE: 150 MMHG | WEIGHT: 138 LBS | BODY MASS INDEX: 25.4 KG/M2

## 2022-10-19 DIAGNOSIS — U07.1 COVID-19: ICD-10-CM

## 2022-10-19 PROCEDURE — 99214 OFFICE O/P EST MOD 30 MIN: CPT | Mod: CS

## 2022-10-19 RX ORDER — BLOOD PRESSURE TEST KIT-LARGE
KIT MISCELLANEOUS
Qty: 1 | Refills: 0 | Status: ACTIVE | COMMUNITY
Start: 2022-10-19 | End: 1900-01-01

## 2022-10-19 NOTE — PLAN
[FreeTextEntry1] : \par HTN\par Continue amlodipine 5 mg QD\par Hypotension precautions discussed\par Increased hydration advised\par Avoid salt\par Check blood pressures at home\par \par \par COVID-19 \par Was positive on 10/06/2022\par Minimal cough , otherwise asymptomatic\par Symptomatic treatment discussed\par Increased hydration advised\par \par Schizoaffective disorder\par No suicidal/homicidal thoughts\par maneged by spsych, has a , \par Was referred to psychiatrist time \par \par Osteoporosis\par Last DEXA: 08/26/2021\par Tried some unspecified oral medication in the past, once/month? COuld have been Ibandronate? But I don't have those records\par Saw rheum Dr Tracy, pending to follow up\par \par Had declined all vaccinations multiple times.\par \par Return to care: within 3 months for medical codnitions  follow up or earlier if needed\par Call or return for any questions

## 2022-10-19 NOTE — HISTORY OF PRESENT ILLNESS
[FreeTextEntry1] : follow up [de-identified] : Ms. JULIA ALCANTARA is a pleasant 77 year old female with PMH of schizophrenia, depression, hypothyroidism s/p thyroid ablation due to hyperthyroidism? managed by endocrinology, HLADRIEL who comes in to the office with her daughter Arthur for  flood pressure folllow up. I started her on amlodipine 5 mg 2 weeks ago, she is tolerating it Ok.\par \par Per records from last note:\par Patient was at [facility], diagnosed with bipolar disorder an other unspecified psych disorder. She had a relapse 1 monthly ago, was at [facility], diagnosed with A-fib, started on Eliquis and was referred to [place] again.\par She is seeing a psychiatrist, was referred to [facility] but she is looking for another options yet.\par She complaints of a mild cough\par She sees a  from [facility]: Deedee [phone]\par \par She saw GI for colon cancer screen last year\par Has declined mammogram multiple times \par \par [place] [facility] [phone]\par Cardio: Birgit Miller\par

## 2022-11-04 DIAGNOSIS — I48.91 UNSPECIFIED ATRIAL FIBRILLATION: ICD-10-CM

## 2022-11-07 ENCOUNTER — NON-APPOINTMENT (OUTPATIENT)
Age: 77
End: 2022-11-07

## 2022-11-07 DIAGNOSIS — N32.9 BLADDER DISORDER, UNSPECIFIED: ICD-10-CM

## 2022-11-28 ENCOUNTER — APPOINTMENT (OUTPATIENT)
Dept: FAMILY MEDICINE | Facility: CLINIC | Age: 77
End: 2022-11-28

## 2022-11-29 ENCOUNTER — APPOINTMENT (OUTPATIENT)
Dept: RHEUMATOLOGY | Facility: CLINIC | Age: 77
End: 2022-11-29

## 2022-11-30 DIAGNOSIS — Z00.00 ENCOUNTER FOR GENERAL ADULT MEDICAL EXAMINATION W/OUT ABNORMAL FINDINGS: ICD-10-CM

## 2022-12-01 ENCOUNTER — INPATIENT (INPATIENT)
Facility: HOSPITAL | Age: 77
LOS: 12 days | Discharge: HOME | DRG: 885 | End: 2022-12-14
Attending: HOSPITALIST | Admitting: FAMILY MEDICINE
Payer: COMMERCIAL

## 2022-12-01 VITALS
WEIGHT: 149.91 LBS | HEART RATE: 91 BPM | DIASTOLIC BLOOD PRESSURE: 80 MMHG | SYSTOLIC BLOOD PRESSURE: 131 MMHG | TEMPERATURE: 98 F | RESPIRATION RATE: 16 BRPM | OXYGEN SATURATION: 94 %

## 2022-12-01 DIAGNOSIS — Z94.7 CORNEAL TRANSPLANT STATUS: Chronic | ICD-10-CM

## 2022-12-01 DIAGNOSIS — Z90.49 ACQUIRED ABSENCE OF OTHER SPECIFIED PARTS OF DIGESTIVE TRACT: Chronic | ICD-10-CM

## 2022-12-01 LAB
ALBUMIN SERPL ELPH-MCNC: 4.2 G/DL — SIGNIFICANT CHANGE UP (ref 3.3–5.2)
ALP SERPL-CCNC: 67 U/L — SIGNIFICANT CHANGE UP (ref 40–120)
ALT FLD-CCNC: 19 U/L — SIGNIFICANT CHANGE UP
ANION GAP SERPL CALC-SCNC: 16 MMOL/L — SIGNIFICANT CHANGE UP (ref 5–17)
APAP SERPL-MCNC: <3 UG/ML — LOW (ref 10–26)
APPEARANCE UR: CLEAR — SIGNIFICANT CHANGE UP
AST SERPL-CCNC: 19 U/L — SIGNIFICANT CHANGE UP
BACTERIA # UR AUTO: ABNORMAL
BASOPHILS # BLD AUTO: 0.03 K/UL — SIGNIFICANT CHANGE UP (ref 0–0.2)
BASOPHILS NFR BLD AUTO: 0.3 % — SIGNIFICANT CHANGE UP (ref 0–2)
BILIRUB SERPL-MCNC: 0.5 MG/DL — SIGNIFICANT CHANGE UP (ref 0.4–2)
BILIRUB UR-MCNC: NEGATIVE — SIGNIFICANT CHANGE UP
BUN SERPL-MCNC: 36.1 MG/DL — HIGH (ref 8–20)
CALCIUM SERPL-MCNC: 9.4 MG/DL — SIGNIFICANT CHANGE UP (ref 8.4–10.5)
CHLORIDE SERPL-SCNC: 104 MMOL/L — SIGNIFICANT CHANGE UP (ref 96–108)
CO2 SERPL-SCNC: 21 MMOL/L — LOW (ref 22–29)
COLOR SPEC: YELLOW — SIGNIFICANT CHANGE UP
CREAT SERPL-MCNC: 0.56 MG/DL — SIGNIFICANT CHANGE UP (ref 0.5–1.3)
DIFF PNL FLD: ABNORMAL
EGFR: 95 ML/MIN/1.73M2 — SIGNIFICANT CHANGE UP
EOSINOPHIL # BLD AUTO: 0.03 K/UL — SIGNIFICANT CHANGE UP (ref 0–0.5)
EOSINOPHIL NFR BLD AUTO: 0.3 % — SIGNIFICANT CHANGE UP (ref 0–6)
EPI CELLS # UR: SIGNIFICANT CHANGE UP
ETHANOL SERPL-MCNC: <10 MG/DL — SIGNIFICANT CHANGE UP (ref 0–9)
FLUAV AG NPH QL: SIGNIFICANT CHANGE UP
FLUBV AG NPH QL: SIGNIFICANT CHANGE UP
GLUCOSE SERPL-MCNC: 94 MG/DL — SIGNIFICANT CHANGE UP (ref 70–99)
GLUCOSE UR QL: NEGATIVE MG/DL — SIGNIFICANT CHANGE UP
HCT VFR BLD CALC: 36.7 % — SIGNIFICANT CHANGE UP (ref 34.5–45)
HGB BLD-MCNC: 12.2 G/DL — SIGNIFICANT CHANGE UP (ref 11.5–15.5)
IMM GRANULOCYTES NFR BLD AUTO: 0.3 % — SIGNIFICANT CHANGE UP (ref 0–0.9)
KETONES UR-MCNC: ABNORMAL
LEUKOCYTE ESTERASE UR-ACNC: NEGATIVE — SIGNIFICANT CHANGE UP
LYMPHOCYTES # BLD AUTO: 0.88 K/UL — LOW (ref 1–3.3)
LYMPHOCYTES # BLD AUTO: 8.7 % — LOW (ref 13–44)
MCHC RBC-ENTMCNC: 29.6 PG — SIGNIFICANT CHANGE UP (ref 27–34)
MCHC RBC-ENTMCNC: 33.2 GM/DL — SIGNIFICANT CHANGE UP (ref 32–36)
MCV RBC AUTO: 89.1 FL — SIGNIFICANT CHANGE UP (ref 80–100)
MONOCYTES # BLD AUTO: 0.51 K/UL — SIGNIFICANT CHANGE UP (ref 0–0.9)
MONOCYTES NFR BLD AUTO: 5.1 % — SIGNIFICANT CHANGE UP (ref 2–14)
NEUTROPHILS # BLD AUTO: 8.61 K/UL — HIGH (ref 1.8–7.4)
NEUTROPHILS NFR BLD AUTO: 85.3 % — HIGH (ref 43–77)
NITRITE UR-MCNC: NEGATIVE — SIGNIFICANT CHANGE UP
PH UR: 6 — SIGNIFICANT CHANGE UP (ref 5–8)
PLATELET # BLD AUTO: 374 K/UL — SIGNIFICANT CHANGE UP (ref 150–400)
POTASSIUM SERPL-MCNC: 4.2 MMOL/L — SIGNIFICANT CHANGE UP (ref 3.5–5.3)
POTASSIUM SERPL-SCNC: 4.2 MMOL/L — SIGNIFICANT CHANGE UP (ref 3.5–5.3)
PROT SERPL-MCNC: 7.5 G/DL — SIGNIFICANT CHANGE UP (ref 6.6–8.7)
PROT UR-MCNC: NEGATIVE — SIGNIFICANT CHANGE UP
RBC # BLD: 4.12 M/UL — SIGNIFICANT CHANGE UP (ref 3.8–5.2)
RBC # FLD: 12.7 % — SIGNIFICANT CHANGE UP (ref 10.3–14.5)
RBC CASTS # UR COMP ASSIST: SIGNIFICANT CHANGE UP /HPF (ref 0–4)
RSV RNA NPH QL NAA+NON-PROBE: SIGNIFICANT CHANGE UP
SALICYLATES SERPL-MCNC: <0.6 MG/DL — LOW (ref 10–20)
SARS-COV-2 RNA SPEC QL NAA+PROBE: SIGNIFICANT CHANGE UP
SODIUM SERPL-SCNC: 140 MMOL/L — SIGNIFICANT CHANGE UP (ref 135–145)
SP GR SPEC: 1.02 — SIGNIFICANT CHANGE UP (ref 1.01–1.02)
T3 SERPL-MCNC: 70 NG/DL — LOW (ref 80–200)
T4 AB SER-ACNC: 11.3 UG/DL — SIGNIFICANT CHANGE UP (ref 4.5–12)
TSH SERPL-MCNC: <0.1 UIU/ML — LOW (ref 0.27–4.2)
UROBILINOGEN FLD QL: NEGATIVE MG/DL — SIGNIFICANT CHANGE UP
WBC # BLD: 10.09 K/UL — SIGNIFICANT CHANGE UP (ref 3.8–10.5)
WBC # FLD AUTO: 10.09 K/UL — SIGNIFICANT CHANGE UP (ref 3.8–10.5)
WBC UR QL: SIGNIFICANT CHANGE UP /HPF (ref 0–5)

## 2022-12-01 PROCEDURE — 99285 EMERGENCY DEPT VISIT HI MDM: CPT

## 2022-12-01 PROCEDURE — 71045 X-RAY EXAM CHEST 1 VIEW: CPT | Mod: 26

## 2022-12-01 PROCEDURE — 72125 CT NECK SPINE W/O DYE: CPT | Mod: 26,MA

## 2022-12-01 PROCEDURE — 70450 CT HEAD/BRAIN W/O DYE: CPT | Mod: 26,MA

## 2022-12-01 RX ORDER — SODIUM CHLORIDE 9 MG/ML
1000 INJECTION INTRAMUSCULAR; INTRAVENOUS; SUBCUTANEOUS ONCE
Refills: 0 | Status: COMPLETED | OUTPATIENT
Start: 2022-12-01 | End: 2022-12-01

## 2022-12-01 RX ADMIN — SODIUM CHLORIDE 1000 MILLILITER(S): 9 INJECTION INTRAMUSCULAR; INTRAVENOUS; SUBCUTANEOUS at 16:51

## 2022-12-01 NOTE — ED ADULT NURSE NOTE - OBJECTIVE STATEMENT
Pt BIBA due to failure to thrive.  Pt mumbling during RN interview.  Only able to state her name, hard to interpret.  NAD noted, respirations even and unlabored.  Safety precautions in place.  Plan of care explained.   Pepe at bedside for silvino.

## 2022-12-01 NOTE — ED PROVIDER NOTE - CLINICAL SUMMARY MEDICAL DECISION MAKING FREE TEXT BOX
77 female history schizoaffective, dementia, hypothyroidism presents with 2 weeks of worsening depression symptoms, now minimally verbal and minimally interactive.  Family reporting possible fall over this time although atraumatic on exam.  Will order CT head/C-spine to evaluate.  Given history of hypothyroidism, will check thyroid levels.  Recently started on Zoloft outpatient, however on exam patient without hyperreflexia or clonus, less suspicious for serotonin toxicity.  Will evaluate for underlying metabolic cause for AMS, psych consult when medically cleared

## 2022-12-01 NOTE — ED PROVIDER NOTE - PHYSICAL EXAMINATION
General: Awake, alert, lying in bed in NAD.  HEENT: Normocephalic, atraumatic. No scleral icterus or conjunctival injection. EOMI. Moist mucous membranes. Oropharynx clear.   Neck:. Soft and supple. Full ROM without pain. No midline tenderness. No lymphadenopathy.  Cardiac: RRR, +S1/S2. No murmurs, rubs, gallops. Peripheral pulses 2+ and symmetric. No LE edema.  Resp: Lungs CTAB. Speaking in full sentences. No wheezes, rales or rhonchi.  Abd: Soft, non-tender, non-distended. No guarding, rebound, or rigidity. No scars, masses, or lesions.  Back: Spine midline and non-tender. No CVA tenderness.    Skin: No rashes, abrasions, or lacerations.  Neuro: AO to self, place, time, does not respond to situation. Follows commands. R arm resting tremor. No clonus noted on exam  Psych: Minimal eye contact, staring forward. Speech is barely audible whisper.

## 2022-12-01 NOTE — ED PROVIDER NOTE - OBJECTIVE STATEMENT
77 female history of schizoaffective, dementia presents with 2 weeks of worsening depression and decreased activity. Spoke to daughter Rosina at [phone] who states pt has multiple frequent psychiatric hospitalizations.  Recently started outpatient on Zoloft.  About 2 weeks ago her depression began to worsen, and she began eating and performing her daily activities less.  Over this time daughter noted the development of a right arm resting tremor, as well as noted a bruise on her right breast.  Unsure if patient fell during this time. Presently patient is minimally verbal and offers no complaints

## 2022-12-01 NOTE — ED ADULT TRIAGE NOTE - CHIEF COMPLAINT QUOTE
Patient BIBEMS from home for worsening depression + failure to thrive over the last 2 weeks. Patient is normally ambulatory, verbal, and able to take care of herself but over the last 2 weeks has been feeling more depressed and is urinating herself. Takes Haldol daily. Family sent her here for a psych consult.

## 2022-12-01 NOTE — ED PROVIDER NOTE - ATTENDING CONTRIBUTION TO CARE
77 female history of schizoaffective, dementia presents with 2 weeks depression and decreased activity. pt for bh evaluation ; pe awake confused; heent ncat neck supple cor s1s2 lung clear abd soft nontender neuro nonfocal dx schizoaffective disorder

## 2022-12-02 DIAGNOSIS — F25.0 SCHIZOAFFECTIVE DISORDER, BIPOLAR TYPE: ICD-10-CM

## 2022-12-02 PROCEDURE — 99220: CPT

## 2022-12-02 RX ADMIN — Medication 1 MILLIGRAM(S): at 17:37

## 2022-12-02 NOTE — ED BEHAVIORAL HEALTH ASSESSMENT NOTE - HPI (INCLUDE ILLNESS QUALITY, SEVERITY, DURATION, TIMING, CONTEXT, MODIFYING FACTORS, ASSOCIATED SIGNS AND SYMPTOMS)
Patient a 78 y/o  female, unemployed, domiciled with her daughter, past Psychiatric hx of Schizoaffective d/o., previous [place] hospitalization in September, 2022 for aggressive and erratic behavior, no drug abuse hx , medically has refractory Hypo-thyroidism, A-Fib, was BIBA for not eating for 2 days. Patient was lying comfortably in bed watching TV. Patient had a right arm and left leg tremor that worsened once the interview was started. An  was utilized to facilitate communication and attempt to conduct an interview, When the  introduced herself the patient was not making eye contact and pointed to the TV. When asked why she was pointing at the TV, she blankly stared at staff. When asked why she was here she was mumbling so quietly the  could not understand her.     Collateral info obtained from her daughter Rosina at [phone] who informs that she has been "depressed" for 2 weeks. She said she has been talking very little and very quietly and eating less and less everyday, she said she finally called the ambulance when she had not eaten for 2 days and was concerned about her being malnourished or dehydrated. Daughter informed she has a past diagnosis of depression first made in 2012, by a Dr. Michael Zavala in [place] ([phone]). Informed her mother has had multiple previous admissions at [facility] for stabilization of her medications after episodes of agitation and confusion including " Her current psychiatrist is Deana Ortiz ([phone]) and current psychologist is Dr. Zamora ([phone]). Daughter said patient was started on Zoloft 2 weeks ago by the psychiatrist due to her recent depression. Patient a 78 y/o  female, unemployed, domiciled with her daughter, past Psychiatric hx of Schizoaffective d/o., previous [place] [facility] hospitalization in September, 2022 for aggressive and erratic behavior, no drug abuse hx , medically has refractory Hypo-thyroidism, A-Fib, was BIBA for not eating for 2 days. Patient was lying in bed, looking at TV . Patient had a right arm and left leg tremor that worsened once the interview was started. An  was utilized to facilitate communication and attempt to conduct an interview, When the  introduced herself the patient was not making eye contact and pointed to the TV. When asked why she was pointing at the TV, she blankly stared at staff. When asked why she was here she was mumbling so quietly the  could not understand her.     Collateral info obtained from her daughter Rosina at [phone] who informs that she has been "depressed" for 2 weeks. She said she has been talking very little and very quietly and eating less and less everyday, she said she finally called the ambulance when she had not eaten for 2 days and was concerned about her being malnourished or dehydrated. Daughter informed she has a past diagnosis of depression first made in 2012, by a Dr. Michael Zavala in [place] ([phone]). Informed her mother has had multiple previous admissions at [facility] for stabilization of her medications after episodes of agitation and confusion including " Her current psychiatrist is Deana Ortiz ([phone]) and current psychologist is Dr. Zamora ([phone]). Daughter said patient was started on Zoloft 2 weeks ago by the psychiatrist due to her recent depression.

## 2022-12-02 NOTE — ED BEHAVIORAL HEALTH ASSESSMENT NOTE - CURRENT MEDICATION
haldol 2mg, tablet, PO, twice a day   Zoloft 25mg, tablet, PO,once a day (started 2 weeks ago)   Elaquis 10mg, tablet, PO, once a day   Asprin 81 mg, tablet, PO, once a day   Oxybutynin 5mg, tablet, PO, once a day   simvastatin 10mg, tablet, PO, once a day   levothyroxine 137 mg, tablet, PO, once a day  Ezetimibe 10mg, tablet, PO, once a day   Amlodipine, 5mg, tablet, PO, once a day Haldol 2mg, tablet, PO, twice a day   Zoloft 25mg, tablet, PO,once a day (started 2 weeks ago)   Elaquis 10mg, tablet, PO, once a day   Asprin 81 mg, tablet, PO, once a day   Oxybutynin 5mg, tablet, PO, once a day   simvastatin 10mg, tablet, PO, once a day   levothyroxine 137 mg, tablet, PO, once a day  Ezetimibe 10mg, tablet, PO, once a day   Amlodipine, 5mg, tablet, PO, once a day

## 2022-12-02 NOTE — ED CDU PROVIDER INITIAL DAY NOTE - OBJECTIVE STATEMENT
77 female history of schizoaffective, dementia presents with 2 weeks of worsening depression and decreased activity. Spoke to daughter Rosina at [phone] who states pt has multiple frequent psychiatric hospitalizations.  Recently started outpatient on Zoloft.  About 2 weeks ago her depression began to worsen, and she began eating and performing her daily activities less.  Over this time daughter noted the development of a right arm resting tremor, as well as noted a bruise on her right breast.  Unsure if patient fell during this time. Presently patient is minimally verbal and offers no complaints

## 2022-12-02 NOTE — ED BEHAVIORAL HEALTH ASSESSMENT NOTE - DETAILS
Previous [place] admission due to aggressive behavior toward her family. A-Fib Hypothyroid Lives with her for many years pending communication None possible admission, Ativan 1 mg IM stat na

## 2022-12-02 NOTE — ED BEHAVIORAL HEALTH ASSESSMENT NOTE - REASON FOR REFERRAL
Bexarotene Counseling:  I discussed with the patient the risks of bexarotene including but not limited to hair loss, dry lips/skin/eyes, liver abnormalities, hyperlipidemia, pancreatitis, depression/suicidal ideation, photosensitivity, drug rash/allergic reactions, hypothyroidism, anemia, leukopenia, infection, cataracts, and teratogenicity.  Patient understands that they will need regular blood tests to check lipid profile, liver function tests, white blood cell count, thyroid function tests and pregnancy test if applicable. " Psychiatric Evaluation"

## 2022-12-02 NOTE — ED BEHAVIORAL HEALTH ASSESSMENT NOTE - NSSUICPROTFACT_PSY_ALL_CORE
Responsibility to children, family, or others/Supportive social network of family or friends Supportive social network of family or friends/Positive therapeutic relationships

## 2022-12-02 NOTE — ED BEHAVIORAL HEALTH ASSESSMENT NOTE - DESCRIPTION
Hypothyroidism, A-fib uneventful  from [facility], domiciled with daughter Pt reassess with Pashto speaking interceptor.  Admits to depressed mood.  Gives one word responses, able to make eye contact, poorly relating, oriented to hospital, but not to why she is here.  Pt has black expression, intense stare, tremors to arms and legs, slow movements, catatonic like movements and behavior.  Daughter reports Pt has been in this condition past when depressed, nonverbal, poor po intake.  Yesterday Pt standing and urinated on floor.  Discussed with ED attending and requested a dose of Ativan 1 mg IM stat.  No acute medical condition notable.  Pt will likely require psych admission for  Schizoaffective disorder  acute with catatonia.

## 2022-12-02 NOTE — ED BEHAVIORAL HEALTH ASSESSMENT NOTE - SUMMARY
Patient a 78 y/o  female, unemployed, domiciled with her daughter, past Psychiatric hx of Schizoaffective d/o., previous [place] hospitalization in September, 2022 for aggressive and erratic behavior, no drug abuse hx, medically has refractory Hypo-thyroidism, A-Fib, was BIBA for not eating for 2 days. Patient is unable to communicate clearly with staff and has visible tremors.     Plan:   Medical Admission to assess for catatonia vs. delirium   Continue Haldol and Zoloft   Neurology referral to r/o dementia Patient a 76 y/o  female, unemployed, domiciled with her daughter, past Psychiatric hx of Schizoaffective d/o., previous [place] hospitalization in September, 2022 for aggressive and erratic behavior, no drug abuse hx, medically has refractory Hypo-thyroidism, A-Fib, was BIBA for not eating for 2 days. Patient is unable to communicate clearly with staff and has visible tremors.     Plan:   Hold for reassessment   Trial of ativan to asses for catatonia  Outpatient neurology referral after discharge Patient a 76 y/o  female, unemployed, domiciled with her daughter, past Psychiatric hx of Schizoaffective d/o., previous [place] hospitalization in September, 2022 for aggressive and erratic behavior, no drug abuse hx, medically has refractory Hypo-thyroidism, A-Fib, was BIBA for not eating for 2 days. Patient is unable to communicate clearly with staff and has visible tremors.     Plan:   Hold for reassessment   Trial of ativan 1 mg IM to asses for catatonia  Possible transfer to inpt psych on geripsych unit.

## 2022-12-03 LAB
ALBUMIN SERPL ELPH-MCNC: 3.6 G/DL — SIGNIFICANT CHANGE UP (ref 3.3–5.2)
ALP SERPL-CCNC: 61 U/L — SIGNIFICANT CHANGE UP (ref 40–120)
ALT FLD-CCNC: 16 U/L — SIGNIFICANT CHANGE UP
ANION GAP SERPL CALC-SCNC: 9 MMOL/L — SIGNIFICANT CHANGE UP (ref 5–17)
AST SERPL-CCNC: 17 U/L — SIGNIFICANT CHANGE UP
BASOPHILS # BLD AUTO: 0.02 K/UL — SIGNIFICANT CHANGE UP (ref 0–0.2)
BASOPHILS NFR BLD AUTO: 0.3 % — SIGNIFICANT CHANGE UP (ref 0–2)
BILIRUB SERPL-MCNC: 0.4 MG/DL — SIGNIFICANT CHANGE UP (ref 0.4–2)
BUN SERPL-MCNC: 24 MG/DL — HIGH (ref 8–20)
CALCIUM SERPL-MCNC: 9.2 MG/DL — SIGNIFICANT CHANGE UP (ref 8.4–10.5)
CHLORIDE SERPL-SCNC: 102 MMOL/L — SIGNIFICANT CHANGE UP (ref 96–108)
CO2 SERPL-SCNC: 25 MMOL/L — SIGNIFICANT CHANGE UP (ref 22–29)
CREAT SERPL-MCNC: 0.53 MG/DL — SIGNIFICANT CHANGE UP (ref 0.5–1.3)
EGFR: 96 ML/MIN/1.73M2 — SIGNIFICANT CHANGE UP
EOSINOPHIL # BLD AUTO: 0.12 K/UL — SIGNIFICANT CHANGE UP (ref 0–0.5)
EOSINOPHIL NFR BLD AUTO: 1.9 % — SIGNIFICANT CHANGE UP (ref 0–6)
GLUCOSE SERPL-MCNC: 113 MG/DL — HIGH (ref 70–99)
HCT VFR BLD CALC: 32.8 % — LOW (ref 34.5–45)
HGB BLD-MCNC: 10.9 G/DL — LOW (ref 11.5–15.5)
IMM GRANULOCYTES NFR BLD AUTO: 0.3 % — SIGNIFICANT CHANGE UP (ref 0–0.9)
LYMPHOCYTES # BLD AUTO: 1.06 K/UL — SIGNIFICANT CHANGE UP (ref 1–3.3)
LYMPHOCYTES # BLD AUTO: 16.7 % — SIGNIFICANT CHANGE UP (ref 13–44)
MCHC RBC-ENTMCNC: 29.8 PG — SIGNIFICANT CHANGE UP (ref 27–34)
MCHC RBC-ENTMCNC: 33.2 GM/DL — SIGNIFICANT CHANGE UP (ref 32–36)
MCV RBC AUTO: 89.6 FL — SIGNIFICANT CHANGE UP (ref 80–100)
MONOCYTES # BLD AUTO: 0.45 K/UL — SIGNIFICANT CHANGE UP (ref 0–0.9)
MONOCYTES NFR BLD AUTO: 7.1 % — SIGNIFICANT CHANGE UP (ref 2–14)
NEUTROPHILS # BLD AUTO: 4.69 K/UL — SIGNIFICANT CHANGE UP (ref 1.8–7.4)
NEUTROPHILS NFR BLD AUTO: 73.7 % — SIGNIFICANT CHANGE UP (ref 43–77)
PLATELET # BLD AUTO: 312 K/UL — SIGNIFICANT CHANGE UP (ref 150–400)
POTASSIUM SERPL-MCNC: 3.5 MMOL/L — SIGNIFICANT CHANGE UP (ref 3.5–5.3)
POTASSIUM SERPL-SCNC: 3.5 MMOL/L — SIGNIFICANT CHANGE UP (ref 3.5–5.3)
PROT SERPL-MCNC: 6.7 G/DL — SIGNIFICANT CHANGE UP (ref 6.6–8.7)
RBC # BLD: 3.66 M/UL — LOW (ref 3.8–5.2)
RBC # FLD: 12.9 % — SIGNIFICANT CHANGE UP (ref 10.3–14.5)
SODIUM SERPL-SCNC: 136 MMOL/L — SIGNIFICANT CHANGE UP (ref 135–145)
WBC # BLD: 6.36 K/UL — SIGNIFICANT CHANGE UP (ref 3.8–10.5)
WBC # FLD AUTO: 6.36 K/UL — SIGNIFICANT CHANGE UP (ref 3.8–10.5)

## 2022-12-03 PROCEDURE — 99226: CPT

## 2022-12-03 RX ORDER — LEVOTHYROXINE SODIUM 125 MCG
137 TABLET ORAL DAILY
Refills: 0 | Status: DISCONTINUED | OUTPATIENT
Start: 2022-12-03 | End: 2022-12-06

## 2022-12-03 RX ORDER — OXYBUTYNIN CHLORIDE 5 MG
5 TABLET ORAL DAILY
Refills: 0 | Status: DISCONTINUED | OUTPATIENT
Start: 2022-12-03 | End: 2022-12-14

## 2022-12-03 RX ORDER — APIXABAN 2.5 MG/1
5 TABLET, FILM COATED ORAL EVERY 12 HOURS
Refills: 0 | Status: DISCONTINUED | OUTPATIENT
Start: 2022-12-03 | End: 2022-12-14

## 2022-12-03 RX ORDER — METOPROLOL TARTRATE 50 MG
25 TABLET ORAL DAILY
Refills: 0 | Status: DISCONTINUED | OUTPATIENT
Start: 2022-12-03 | End: 2022-12-14

## 2022-12-03 RX ADMIN — APIXABAN 5 MILLIGRAM(S): 2.5 TABLET, FILM COATED ORAL at 16:31

## 2022-12-03 RX ADMIN — Medication 25 MILLIGRAM(S): at 15:34

## 2022-12-03 RX ADMIN — Medication 137 MICROGRAM(S): at 15:34

## 2022-12-03 NOTE — CONSULT NOTE ADULT - ASSESSMENT
The patient is a 77y Female with psychiatric disease and reported dementia now presenting with worsening of condition.   She has marked extrapyramidal findings as well.    Altered mental status.   No acute medical issues to explain worsening.   May be a progression of dementia and psychiatric condition.     Tremor  Likely extrapyramidal side effects of her medication.   Would not suggest dopamine agonist as this tends to worsen psychotic symptoms.     Schizoaffective disorder  Per Behavioral Health Team.    Case discussed with ER team (Dr Carbajal attending).

## 2022-12-03 NOTE — CONSULT NOTE ADULT - SUBJECTIVE AND OBJECTIVE BOX
[facility]                                        Neurology at [place]                                  [facility]                                      [address]                                           [place], [place], [address]                                                [phone]        CC: altered mental status  and tremor.     HISTORY:  The patient is a 77y Female with schizoaffective disorder and reported history of dementia. Now presents with "worsening depression."   She reportedly has had worsening tremor recently as well.   She has been described as minimally verbal since arrival.    PAST MEDICAL & SURGICAL HISTORY:  Dementia  per daughter  Hypothyroid  History of appendectomy  History of corneal transplant    MEDICATION PRIOR TO ADMISSION:  · 	oxybutynin 5 mg oral tablet: 1 tab(s) orally once a day  · 	levothyroxine 137 mcg (0.137 mg) oral tablet: 1 tab(s) orally once a day  · 	metoprolol succinate 25 mg oral tablet, extended release: 1 tab(s) orally once a day  · 	melatonin 3 mg oral tablet: 1 tab(s) orally once a day (at bedtime), As needed, Insomnia  · 	haloperidol 1 mg oral tablet: 1 tab(s) orally 2 times a day  · 	ondansetron 2 mg/mL injectable solution: 4 milligram(s) injectable every 8 hours, As Needed  · 	mirtazapine 30 mg oral tablet: 1 tab(s) orally once a day (at bedtime)  · 	apixaban 5 mg oral tablet: 1 tab(s) orally every 12 hours  · 	aluminum hydroxide-magnesium hydroxide 200 mg-200 mg/5 mL oral suspension: 30 milliliter(s) orally every 4 hours, As needed, Dyspepsia  · 	acetaminophen 325 mg oral tablet: 2 tab(s) orally every 6 hours, As needed, Temp greater or equal to 38C (100.4F), Mild Pain (1 - 3)    Allergies  No Known Allergies    SOCIAL HISTORY:  Denies smoking.    FAMILY HISTORY:  FHx: hypothyroidism (Father)    ROS:  Constitutional: The patient denies fevers or weight changes.  Neuro: As per HPI.  Eyes: Denies blurry vision.  Ears/nose/throat: Denies Tinnitus.   Cardiac: Denies chest pain. Denies palpitations.  Respiratory: Denies shortness of breath.  GI: Denies abdominal pain, nausea, or vomiting.  : Denies change in urinary pattern.  Integumentary: Denies rash.  Psych: Denies recent mood changes.  Heme: denies easy bleeding/bruising.    Exam:  Vital Signs Last 24 Hrs  T(C): 36.7 (03 Dec 2022 07:24), Max: 37.3 (02 Dec 2022 11:00)  T(F): 98 (03 Dec 2022 07:24), Max: 99.1 (02 Dec 2022 11:00)  HR: 77 (03 Dec 2022 07:24) (65 - 85)  BP: 105/69 (03 Dec 2022 07:24) (101/63 - 125/66)  RR: 18 (03 Dec 2022 07:24) (17 - 20)  SpO2: 95% (03 Dec 2022 07:24) (95% - 97%)    Parameters below as of 03 Dec 2022 07:24  Patient On (Oxygen Delivery Method): room air      General: NAD.   Carotid bruits absent.     Mental status: The patient is awake but slow to respond. She has markedly hypophonic speech and answers in 1-2 word responses in Sami only. She follows simple instructions.     Cranial nerves: There is no papilledema. Pupils react symmetrically to light. There is no visual field deficit to confrontation. Extraocular motion is restricted in all directions with no nystagmus.  Facial sensation is intact. Facial musculature is symmetric. Palate elevates symmetrically. Tongue is midline.    Motor: There is some cogwheeling of the wrists with reinforcement. There is moderate tremor of the extremities at rest.   Strength is symmetric in the upper extremities and lower extremities although diffusely weak.    Sensation: Intact to light touch and pin. There is no extinction to double simultaneous stimulation.    Reflexes: 1+ throughout and plantar responses are flexor.    Cerebellar: Cannot be tested.     LABS:                         12.2   10.09 )-----------( 374      ( 01 Dec 2022 16:52 )             36.7       12-01    140  |  104  |  36.1<H>  ----------------------------<  94  4.2   |  21.0<L>  |  0.56    Ca    9.4      01 Dec 2022 16:52    TPro  7.5  /  Alb  4.2  /  TBili  0.5  /  DBili  x   /  AST  19  /  ALT  19  /  AlkPhos  67  12-01    RADIOLOGY   CT head images reviewed (and concur with report): There is no acute pathology.

## 2022-12-03 NOTE — PROVIDER CONTACT NOTE (OTHER) - SITUATION
chart reviewed, contents noted, PT orders received. PT eval completed & documented see note for details. Used Macedonian  #[id number]. received/returned in stretcher in NAD CBWR

## 2022-12-03 NOTE — PHYSICAL THERAPY INITIAL EVALUATION ADULT - GENERAL OBSERVATIONS, REHAB EVAL
Pt received in stretcher, + IV Loc, breathing on RA in NAD, in 0/10 nonverbal indicators of pain, agreeable to PT evaluation. Pt speaks portugese  used #[id number]

## 2022-12-03 NOTE — PROVIDER CONTACT NOTE (OTHER) - ASSESSMENT
MaxAx1 for bed mobility, minAx1 for transfers and gait with use of RW. Ambulated 5ft, narrow base of support. Max Cues for sequencing. Pt grossly weak, hypophonic, responds best with yes/no questions

## 2022-12-03 NOTE — ED BEHAVIORAL HEALTH NOTE - BEHAVIORAL HEALTH NOTE
PROGRESS NOTE: 22 @ 12:13  	  • Reason for Ongoing Consultation: 	    ID: 77yyo Female with HEALTH ISSUES - PROBLEM Dx:  Schizoaffective disorder, bipolar type, with catatonia      INTERVAL DATA:   • Interval Chief Complaint:    • Interval History:   Patient a 76 y/o  female, unemployed, domiciled with her daughter, past Psychiatric hx of Schizoaffective d/o., previous [place] hospitalization in  for aggressive and erratic behavior, no drug abuse hx , medically has refractory Hypo-thyroidism, A-Fib, was BIBA for not eating for 2 days.     Patient seen for follow up and found to be laying in bed with poor eye contact an bilateral tremor. Patient both Dominican and Telugu speaking. Writer attempted to use Dominican  through ipad but could not engage. writer then utilized ER staff for Telugu interpretation. Patient was found to be hypophonic, mumbling at times and difficult to understand. Patient alert , oriented to person and partially to place but does now know why she is In the hospital. Patient does admit to having difficulty moving and speaking and admits to having problems with depression but with no s/h ideation. Patietn then mumbling and could not answer any further questions.     Collateral taken from daughter who reports patient has problems with depression and when she normally gets depressed she does get withdrawn, lays in bed and doesn't talk much but now states this situation is worse and different. Daughter states for last 2 weeks, patient has not been speaking, not eating, has not walked in days, and developed a tremor which she never had.     Chart reviewed and patient given ativan trial yesterday to r/o Catatonia but with no response.         REVIEW OF SYSTEMS:   • Constitutional Symptoms	No complaints  • Eyes	No complaints  • Ears / Nose / Throat / Mouth	No complaints  • Cardiovascular	No complaints  • Respiratory	No complaints  • Gastrointestinal	No complaints  • Genitourinary	No complaints  • Musculoskeletal	No complaints  • Skin	No complaints  • Neurological	No complaints  • Psychiatric (see HPI)	See HPI  • Endocrine	No complaints  • Hematologic / Lymphatic	No complaints  • Allergic / Immunologic	No complaints    REVIEW OF VITALS/LABS/IMAGING/INVESTIGATIONS:   • Vital signs reviewed: Yes  • Vital Signs:	    T(C): 36.9 (22 @ 10:19), Max: 36.9 (22 @ 15:09)  HR: 85 (22 @ 10:19) (65 - 85)  BP: 119/67 (22 @ 10:19) (101/63 - 123/67)  RR: 18 (22 @ 10:19) (17 - 18)  SpO2: 94% (22 @ 10:19) (94% - 97%)    • Available labs reviewed: Yes  • Available Lab Results:                           12.2   10.09 )-----------( 374      ( 01 Dec 2022 16:52 )             36.7         140  |  104  |  36.1<H>  ----------------------------<  94  4.2   |  21.0<L>  |  0.56    Ca    9.4      01 Dec 2022 16:52    TPro  7.5  /  Alb  4.2  /  TBili  0.5  /  DBili  x   /  AST  19  /  ALT  19  /  AlkPhos  67      LIVER FUNCTIONS - ( 01 Dec 2022 16:52 )  Alb: 4.2 g/dL / Pro: 7.5 g/dL / ALK PHOS: 67 U/L / ALT: 19 U/L / AST: 19 U/L / GGT: x             Urinalysis Basic - ( 01 Dec 2022 19:56 )    Color: Yellow / Appearance: Clear / S.025 / pH: x  Gluc: x / Ketone: Small  / Bili: Negative / Urobili: Negative mg/dL   Blood: x / Protein: Negative / Nitrite: Negative   Leuk Esterase: Negative / RBC: 0-2 /HPF / WBC 0-2 /HPF   Sq Epi: x / Non Sq Epi: Occasional / Bacteria: Occasional          MEDICATIONS:      PRN Medications:  • PRN Medications since last evaluation	  • PRN Details	    Current Medications:      Medication Side Effects:  • Medication Side Effects or Adverse Reactions (new or ongoing)	None known    MENTAL STATUS EXAM:   • Level of Consciousness	Somnolent   • General Appearance	Well developed  • Body Habitus	Well nourished  • Hygiene	Good  • Grooming	poor   • Behavior	withdrawn   • Eye Contact	Poor   • Relatedness	Poor   • Impulse Control	Normal  • Muscle Tone / Strength	not assessed   • Abnormal Movements	BL UE and LE tremor   • Gait / Station	not assessed  • Speech Volume	low   • Speech Rate	slow   • Speech Spontaneity	low   • Speech Articulation	impaired   • Mood	depressed   • Affect Quality	flat   • Affect Range	restircted   • Affect Congruence	Congruent  • Thought Process	blocked   • Thought Associations	Normal  • Thought Content	Unremarkable  • Perceptions	No abnormalities  • Oriented to Time	no   • Oriented to Place	Yes  • Oriented to Situation	no  • Oriented to Person	Yes  • Attention / Concentration	poor   • Estimated Intelligence	unable to assess   • Recent Memory	unable to assess   • Remote Memory	unable to assess   • Fund of Knowledge	unable to assess   • Language	No abnormalities noted  • Judgment (regarding everyday events)	questionable   • Insight (regarding psychiatric illness)	questionable     SUICIDALITY:   • Suicidality (Interval)	none known    HOMICIDALITY/AGGRESSION:   • Homicidality/Aggression	none known    DIAGNOSIS DSM-V:    Psychiatric Diagnosis (Corresponds to DSM-IV Axis I, II):   HEALTH ISSUES - PROBLEM Dx:  Schizoaffective disorder, bipolar type, with catatonia             Medical Diagnosis (Corresponds to DSM-IV Axis III):  • Axis III	      ASSESSMENT OF CURRENT CONDITION:   Summary (include case differential, formulation and patient response to therapy):   Patient a 76 y/o  female, unemployed, domiciled with her daughter, past Psychiatric hx of Schizoaffective d/o., previous [place] hospitalization in  for aggressive and erratic behavior, no drug abuse hx , medically has refractory Hypo-thyroidism, A-Fib, was BIBA for not eating for 2 days.     Patient seen chart reviewed and case discussed with team.   Neurology recs appreciated: parkinsonian signs/ EPS, unclear if underlying parkinsons or EPS effect from haldol   Patient currently stiff, with tremors, minimally verbal and with motor weakness. CT scan negative for acute pathology, labs wnl and attempt made yesterday for Ativan trial to r/o catatonia which showed no response and makes catatonia less likely especially with no past history.   Consider PT consult and also continue to r/o any medical pathology that may contribute to her AMS and motor difficulties and psychiatry will follow PROGRESS NOTE: 22 @ 12:13  	  • Reason for Ongoing Consultation: 	    ID: 77yyo Female with HEALTH ISSUES - PROBLEM Dx:  Schizoaffective disorder, bipolar type, with catatonia      INTERVAL DATA:   • Interval Chief Complaint:    • Interval History:   Patient a 78 y/o  female, unemployed, domiciled with her daughter, past Psychiatric hx of Schizoaffective d/o., previous [place] hospitalization in  for aggressive and erratic behavior, no drug abuse hx , medically has refractory Hypo-thyroidism, A-Fib, was BIBA for not eating for 2 days.     Patient seen for follow up and found to be laying in bed with poor eye contact an bilateral tremor. Patient both Austrian and Sami speaking. Writer attempted to use Austrian  through ipad but could not engage. writer then utilized ER staff for Sami interpretation. Patient was found to be hypophonic, mumbling at times and difficult to understand. Patient alert , oriented to person and partially to place but does now know why she is In the hospital. Patient does admit to having difficulty moving and speaking and admits to having problems with depression but with no s/h ideation. Patietn then mumbling and could not answer any further questions.     Collateral taken from daughter who reports patient has problems with depression and when she normally gets depressed she does get withdrawn, lays in bed and doesn't talk much but now states this situation is worse and different. Daughter states for last 2 weeks, patient has not been speaking, not eating, has not walked in days, and developed a tremor which she never had.     Chart reviewed and patient given ativan trial yesterday to r/o Catatonia but with no response.         REVIEW OF SYSTEMS:   • Constitutional Symptoms	No complaints  • Eyes	No complaints  • Ears / Nose / Throat / Mouth	No complaints  • Cardiovascular	No complaints  • Respiratory	No complaints  • Gastrointestinal	No complaints  • Genitourinary	No complaints  • Musculoskeletal	No complaints  • Skin	No complaints  • Neurological	No complaints  • Psychiatric (see HPI)	See HPI  • Endocrine	No complaints  • Hematologic / Lymphatic	No complaints  • Allergic / Immunologic	No complaints    REVIEW OF VITALS/LABS/IMAGING/INVESTIGATIONS:   • Vital signs reviewed: Yes  • Vital Signs:	    T(C): 36.9 (22 @ 10:19), Max: 36.9 (22 @ 15:09)  HR: 85 (22 @ 10:19) (65 - 85)  BP: 119/67 (22 @ 10:19) (101/63 - 123/67)  RR: 18 (22 @ 10:19) (17 - 18)  SpO2: 94% (22 @ 10:19) (94% - 97%)    • Available labs reviewed: Yes  • Available Lab Results:                           12.2   10.09 )-----------( 374      ( 01 Dec 2022 16:52 )             36.7         140  |  104  |  36.1<H>  ----------------------------<  94  4.2   |  21.0<L>  |  0.56    Ca    9.4      01 Dec 2022 16:52    TPro  7.5  /  Alb  4.2  /  TBili  0.5  /  DBili  x   /  AST  19  /  ALT  19  /  AlkPhos  67      LIVER FUNCTIONS - ( 01 Dec 2022 16:52 )  Alb: 4.2 g/dL / Pro: 7.5 g/dL / ALK PHOS: 67 U/L / ALT: 19 U/L / AST: 19 U/L / GGT: x             Urinalysis Basic - ( 01 Dec 2022 19:56 )    Color: Yellow / Appearance: Clear / S.025 / pH: x  Gluc: x / Ketone: Small  / Bili: Negative / Urobili: Negative mg/dL   Blood: x / Protein: Negative / Nitrite: Negative   Leuk Esterase: Negative / RBC: 0-2 /HPF / WBC 0-2 /HPF   Sq Epi: x / Non Sq Epi: Occasional / Bacteria: Occasional          MEDICATIONS:      PRN Medications:  • PRN Medications since last evaluation	  • PRN Details	    Current Medications:      Medication Side Effects:  • Medication Side Effects or Adverse Reactions (new or ongoing)	None known    MENTAL STATUS EXAM:   • Level of Consciousness	Somnolent   • General Appearance	Well developed  • Body Habitus	Well nourished  • Hygiene	Good  • Grooming	poor   • Behavior	withdrawn   • Eye Contact	Poor   • Relatedness	Poor   • Impulse Control	Normal  • Muscle Tone / Strength	not assessed   • Abnormal Movements	BL UE and LE tremor   • Gait / Station	not assessed  • Speech Volume	low   • Speech Rate	slow   • Speech Spontaneity	low   • Speech Articulation	impaired   • Mood	depressed   • Affect Quality	flat   • Affect Range	restircted   • Affect Congruence	Congruent  • Thought Process	blocked   • Thought Associations	Normal  • Thought Content	Unremarkable  • Perceptions	No abnormalities  • Oriented to Time	no   • Oriented to Place	Yes  • Oriented to Situation	no  • Oriented to Person	Yes  • Attention / Concentration	poor   • Estimated Intelligence	unable to assess   • Recent Memory	unable to assess   • Remote Memory	unable to assess   • Fund of Knowledge	unable to assess   • Language	No abnormalities noted  • Judgment (regarding everyday events)	questionable   • Insight (regarding psychiatric illness)	questionable     SUICIDALITY:   • Suicidality (Interval)	none known    HOMICIDALITY/AGGRESSION:   • Homicidality/Aggression	none known    DIAGNOSIS DSM-V:    Psychiatric Diagnosis (Corresponds to DSM-IV Axis I, II):   HEALTH ISSUES - PROBLEM Dx:  Schizoaffective disorder, bipolar type, with catatonia             Medical Diagnosis (Corresponds to DSM-IV Axis III):  • Axis III	      ASSESSMENT OF CURRENT CONDITION:   Summary (include case differential, formulation and patient response to therapy):   Patient a 78 y/o  female, unemployed, domiciled with her daughter, past Psychiatric hx of Schizoaffective d/o., previous [place] hospitalization in  for aggressive and erratic behavior, no drug abuse hx , medically has refractory Hypo-thyroidism, A-Fib, was BIBA for not eating for 2 days.     Patient seen chart reviewed and case discussed with team.   Neurology recs appreciated: parkinsonian signs/ EPS, unclear if underlying parkinson's or EPS effect from haldol   Patient currently stiff, with tremors, minimally verbal and with motor weakness. CT scan negative for acute pathology, labs wnl and attempt made yesterday for Ativan trial to r/o catatonia which showed no response and makes catatonia less likely especially with no past history.   Consider PT consult and also continue to r/o any medical pathology that may contribute to her AMS and motor difficulties and psychiatry will follow. unclear if inpatient psych will benefit, may benefit from EFREN?     DDx.   Delirium vs worsening dementia, vs parkinson's vs catatonia but with negative ativan challenge

## 2022-12-04 PROCEDURE — 99226: CPT

## 2022-12-04 RX ADMIN — Medication 5 MILLIGRAM(S): at 13:47

## 2022-12-04 RX ADMIN — Medication 25 MILLIGRAM(S): at 06:35

## 2022-12-04 RX ADMIN — Medication 137 MICROGRAM(S): at 06:35

## 2022-12-04 RX ADMIN — APIXABAN 5 MILLIGRAM(S): 2.5 TABLET, FILM COATED ORAL at 06:34

## 2022-12-04 RX ADMIN — APIXABAN 5 MILLIGRAM(S): 2.5 TABLET, FILM COATED ORAL at 20:45

## 2022-12-04 NOTE — CHART NOTE - NSCHARTNOTEFT_GEN_A_CORE
ANNEMARIENote: pt for EFREN , MONO requested will circulate to multiple facilities first thing in the am .Daughter to pick preferred option(has not answered as of yet) , no auth needed. SW will continue to follow .

## 2022-12-04 NOTE — PROGRESS NOTE ADULT - ASSESSMENT
77y Female with psychiatric disease and reported dementia now presenting with worsening of condition.   She has marked extrapyramidal findings as well.    Altered mental status.   No acute medical issues to explain worsening.   May be a progression of dementia and psychiatric condition.     Tremor  Likely extrapyramidal side effects of her medication.   Would not suggest dopamine agonist as this tends to worsen psychotic symptoms.     Schizoaffective disorder  Per Behavioral Health Team.

## 2022-12-04 NOTE — ED CDU PROVIDER SUBSEQUENT DAY NOTE - NS ED ROS FT
Unable to obtain secondary to not responding to questions
Unable to obtain secondary to not responding to questions

## 2022-12-04 NOTE — PROGRESS NOTE ADULT - SUBJECTIVE AND OBJECTIVE BOX
[facility]                                        [facility] [place]                                 [facility]                                  [address]                                        [place], [place], [address]                                             [phone]        CC: altered mental status  and tremor.     HPI:   The patient is a 77y Female with schizoaffective disorder and reported history of dementia. Now presents with "worsening depression."   She reportedly has had worsening tremor recently as well.   She has been described as minimally verbal since arrival.    Interim history:  Remains in ER on Observation.    ROS:   Responds "no" to all symptoms.     MEDICATIONS  (STANDING):  apixaban 5 milliGRAM(s) Oral every 12 hours  levothyroxine 137 MICROGram(s) Oral daily  metoprolol succinate ER 25 milliGRAM(s) Oral daily  oxybutynin 5 milliGRAM(s) Oral daily    Vital Signs Last 24 Hrs  T(C): 36.7 (04 Dec 2022 07:23), Max: 37.1 (03 Dec 2022 15:12)  T(F): 98 (04 Dec 2022 07:23), Max: 98.7 (03 Dec 2022 15:12)  HR: 64 (04 Dec 2022 07:23) (64 - 85)  BP: 105/67 (04 Dec 2022 07:23) (95/65 - 127/73)  BP(mean): 93 (04 Dec 2022 05:00) (93 - 93)  RR: 18 (04 Dec 2022 07:23) (17 - 18)  SpO2: 96% (04 Dec 2022 07:23) (94% - 99%)    Parameters below as of 04 Dec 2022 06:35  Patient On (Oxygen Delivery Method): room air    Detailed Neurologic Exam:    Mental status: The patient is awake and alert. Masked expression. Hypophonic speech. Gives name correctly but little other verbalization. Follows simple instructions.    Cranial nerves: Pupils equal and react symmetrically to light. There is no visual field deficit to threat. Extraocular motion is full with no nystagmus. Facial sensation is intact. Facial musculature is symmetric. Palate elevates symmetrically. Tongue is midline.    Motor: There is some cogwheeling of the wrists with reinforcement. There is moderate tremor of the extremities at rest.   Strength is symmetric in the upper extremities and lower extremities although diffusely weak.    Sensation: Intact to light touch and pin. There is no extinction to double simultaneous stimulation.    Reflexes: 1+ throughout and plantar responses are flexor.    Cerebellar: Cannot be tested.     Labs:     12-03    136  |  102  |  24.0<H>  ----------------------------<  113<H>  3.5   |  25.0  |  0.53    Ca    9.2      03 Dec 2022 15:24    TPro  6.7  /  Alb  3.6  /  TBili  0.4  /  DBili  x   /  AST  17  /  ALT  16  /  AlkPhos  61  12-03                            10.9   6.36  )-----------( 312      ( 03 Dec 2022 15:24 )             32.8

## 2022-12-05 DIAGNOSIS — I48.91 UNSPECIFIED ATRIAL FIBRILLATION: ICD-10-CM

## 2022-12-05 DIAGNOSIS — F25.9 SCHIZOAFFECTIVE DISORDER, UNSPECIFIED: ICD-10-CM

## 2022-12-05 DIAGNOSIS — E86.0 DEHYDRATION: ICD-10-CM

## 2022-12-05 DIAGNOSIS — E03.9 HYPOTHYROIDISM, UNSPECIFIED: ICD-10-CM

## 2022-12-05 PROCEDURE — 99232 SBSQ HOSP IP/OBS MODERATE 35: CPT

## 2022-12-05 PROCEDURE — 99222 1ST HOSP IP/OBS MODERATE 55: CPT

## 2022-12-05 PROCEDURE — 99217: CPT

## 2022-12-05 RX ORDER — SODIUM CHLORIDE 9 MG/ML
1000 INJECTION INTRAMUSCULAR; INTRAVENOUS; SUBCUTANEOUS
Refills: 0 | Status: COMPLETED | OUTPATIENT
Start: 2022-12-05 | End: 2022-12-05

## 2022-12-05 RX ADMIN — APIXABAN 5 MILLIGRAM(S): 2.5 TABLET, FILM COATED ORAL at 08:37

## 2022-12-05 RX ADMIN — Medication 137 MICROGRAM(S): at 08:37

## 2022-12-05 RX ADMIN — Medication 5 MILLIGRAM(S): at 17:17

## 2022-12-05 RX ADMIN — APIXABAN 5 MILLIGRAM(S): 2.5 TABLET, FILM COATED ORAL at 17:17

## 2022-12-05 RX ADMIN — Medication 25 MILLIGRAM(S): at 08:37

## 2022-12-05 RX ADMIN — SODIUM CHLORIDE 70 MILLILITER(S): 9 INJECTION INTRAMUSCULAR; INTRAVENOUS; SUBCUTANEOUS at 20:45

## 2022-12-05 NOTE — ED ADULT NURSE REASSESSMENT NOTE - NS ED NURSE REASSESS COMMENT FT1
Arthur Rizvi ([phone]) called to provide mother's med list and to provide psychiatrist's contact info. Psychiatrist Deana Ortiz - [phone]    Medications  ezetimibe 10mg, sertraline 25mg (started 2 weeks ago), simvastatin 10mg, apixaban 5mg, haloperidol, aspirin 81mg, amlodipine 5mg, levothyroxine 137 mcg    Provider made aware.
Assumed care of pt. from JOHN Fair- pt. resting comfortable in stretcher, restless. Resp. equal and unlabored b/l. VSS. NAD. Comfort measures provided, safety measures implemented, call bell in reach.
Patient slept well during the night in no acute distress.  Patient remains calm and cooperative.  Awaiting Psych consult today.
Pt alert to self and responds to name resting comfortably at this time. Pt follows directions but does not answer questions. Yip catheter has clear, yellow output. Pt cleaned and repositioned; no skin breakdown noted. pt has evening meal at bedside and is eating. Bed locked and in lowest position. Will continue to monitor.
Pt provided with DASH diet food tray. SNA assisted pt with feeding. Pt ate half of tray; will continue to monitor.
assumed care from day RN Keegan.  pt resting comfortably in stretcher.  denies any complaints.  NAD at this time.
assumed care of pt from RN GR, pt non verbal, resp. even and unlabored on RA, griggs catheter patent and draining to bag below bladder level, according to previous RN pt here for worsening schizophrenia and depression, pt unable to tell me of any other complaints at this time, no family present at bedside
incontinent care provided as needed.  pt repositioned to maintain skin integrity and promote comfort.
pt sleeping in stretcher.  no aggressive behavior noted.  NAD at this time.
patient ate lunch with writers assistance. patients daughter at bedside at this time. would like an update of plan, dr nuñez made aware. continuing to monitor.
unable to complete full NIHSS on patient due to inability to follow most commands and communicate effectively. interpretation used. patient is weak in all extremities b/l. patient is alert to verbal stimuli. was able to eat breakfast and drink coffee with full assistance by writer, no gurgling, coughing or aspirating noted. physical therapy at bedside working with patient currently. patient denies any pain at this time. will continue to monitor.
Assumed care of pt at this time. Pt A&O x 1 responds to name, but does not answer questions. Airway patent. Respirations even and unlabored. Pt cleaned and repositioned. Bed locked and in lowest position. Will continue to monitor.
Assumed care of the patient @1340. Pt awake and alert x1. Yip intact draining clear yellow urine. Call bell within reach and encouraged to use when assistance needed. Will continue to monitor. Awaiting EFREN placement.
Pt alert to self and responds to name resting comfortably at this time. Pt follows directions but does not answer questions. Yip catheter has clear, yellow output. Pt cleaned and repositioned; no skin breakdown noted. Pt fed breakfast tray; pt ate half of tray with assistance from SNA. Bed locked and in lowest position. Frequent checks made. Will continue to monitor.

## 2022-12-05 NOTE — H&P ADULT - NSICDXPASTMEDICALHX_GEN_ALL_CORE_FT
PAST MEDICAL HISTORY:  Atrial fibrillation     Dementia per daughter    Hypothyroid     Schizoaffective disorder

## 2022-12-05 NOTE — ED CDU PROVIDER SUBSEQUENT DAY NOTE - MEDICAL DECISION MAKING DETAILS
Catatonia, responded to benzo, hold in ED for assessment by psychiatry in AM.
Pt awaiting re-eval by psych this AM
pending EFREN placement

## 2022-12-05 NOTE — ED CDU PROVIDER SUBSEQUENT DAY NOTE - PHYSICAL EXAMINATION
Gen: female in NAD  Head: NC/AT  Neck: trachea midline  Resp:  No distress  Abd: soft, non-distended  Ext: no deformities  Neuro:  A&Ox1   Skin:  Warm and dry as visualized

## 2022-12-05 NOTE — CHART NOTE - NSCHARTNOTEFT_GEN_A_CORE
ANNEMARIE Note: SW read and reviewed previous SW notes, PT recc. EFREN at this time, BH following as well as pt has hx of depression, recent inpt stay at [facility] in Sept. however no current reccs for inpt psych. Pt listed as medicare primary, medicaid secondary. MONO circulating, no accepting facilities as of yet, some facilities stating that pt's name not coming up in medicare system to verify benefits, a lot of facilities declining referral due to BH concerns. [facility] is reviewing case and stated they can offer a bed pending verification of pt's name and medicare benefits. SW placed call to pt's dgtr to discuss d/c plan and barriers, dgtr stated she will be at hospital shortly to discuss.    ANNEMARIE and KIARA Tucker met w/ dgtr Zenon [phone]) at bedside shortly after to discuss further, CCC and SW explained many facilities may be hesitant due to BH hx, asked dgtr to keep open mind as referral may need to be expanded to [facility] area due to no accepting beds at this time, SW also inquired about pt's name, dgtr states pt's name has been an ongoing issue, pts full name is AmbikaAlexa Rizvi, however pt has used several variations of the name over the years, provided writer w/ pt's insurance cards, Pt's name on medicare card is MagalieADRIEL Pillai, pt's name on medicaid card is Ambika Enoc. ANNEMARIE made copies of cards and uploaded to [facility], insurance info forwarded to reviewing facilities. [facility] admissions reported back that pt only has medicare part B coverage, does not have any coverage at this time for EFREN benefit. Pt does have EFREN benefit through secondary plan healthfirst medicaid however will need prior auth, [facility] is not in network with [facility] and therefore rescinded bed offer. [facility] offering bed however stated they would need a [facility]. SW discussed w/ dgtr above information, dgtr stated pt previously lived in the city and had a managed plan through "[employer]" however thy stopped paying premium and pt was disenrolled, dtr was under assumption pt had regular medicare part A and B. Dgtr aware referral being sent out to further catchment area. SW left info at pt's bedside instructing dgtr how to contact SS Offices to re-enroll in medicare, also left info on how to contact medicaid for aides at home.     SW recirculated MONO globally, awaiting further bed offers, SW to follow ANNEMARIE Note: SW read and reviewed previous SW notes, PT recc. EFREN at this time, BH following as well as pt has hx of depression, recent inpt stay at [facility] in Sept. however no current reccs for inpt psych. Pt listed as medicare primary, medicaid secondary. MONO circulating, no accepting facilities as of yet, some facilities stating that pt's name not coming up in medicare system to verify benefits, a lot of facilities declining referral due to BH concerns. [facility] is reviewing case and stated they can offer a bed pending verification of pt's name and medicare benefits. SW placed call to pt's dgtr to discuss d/c plan and barriers, dgtr stated she will be at hospital shortly to discuss.    ANNEMARIE and KIARA Tucker met w/ dgtr Zenon [phone]) at bedside shortly after to discuss further, CCC and SW explained many facilities may be hesitant due to BH hx, asked dgtr to keep open mind as referral may need to be expanded to [facility] area due to no accepting beds at this time, SW also inquired about pt's name, dgtr states pt's name has been an ongoing issue, pts full name is AmbikaAlexa Rizvi, however pt has used several variations of the name over the years, provided writer w/ pt's insurance cards, Pt's name on medicare card is MagalieADRIEL Pillai, pt's name on medicaid card is Ambika Enoc. ANNEMARIE made copies of cards and uploaded to [facility], insurance info forwarded to reviewing facilities. [facility] admissions reported back that pt only has medicare part B coverage, does not have any coverage at this time for EFREN benefit. Pt does have EFREN benefit through secondary plan [employer] medicaid however will need prior auth, [facility] is not in network with [facility] and therefore rescinded bed offer. [facility] offering bed however stated they would need a single case agreement [facility]. ANNEMARIE discussed w/ dgtr above information, dgtr stated pt previously lived [place] the city and had a managed plan through "[employer]" however thy stopped paying premium and pt was disenrolled, dtr was under assumption pt had regular medicare part A and B. Dgtr aware referral being sent out to further catchment area. ANNEMARIE left info at pt's bedside instructing dgtr how to contact SS Offices to re-enroll in medicare part A, also left info on how to contact medicaid to start process on getting aides at home.     SW recirculated MONO globally, awaiting further bed offers, SW to follow

## 2022-12-05 NOTE — H&P ADULT - NSHPPHYSICALEXAM_GEN_ALL_CORE
Vital Signs Last 24 Hrs  T(C): 37.1 (05 Dec 2022 17:00), Max: 37.1 (05 Dec 2022 17:00)  T(F): 98.8 (05 Dec 2022 17:00), Max: 98.8 (05 Dec 2022 17:00)  HR: 77 (05 Dec 2022 17:00) (65 - 77)  BP: 121/79 (05 Dec 2022 17:00) (90/50 - 133/65)  BP(mean): --  RR: 16 (05 Dec 2022 17:00) (16 - 20)  SpO2: 94% (05 Dec 2022 17:00) (94% - 98%)    Parameters below as of 05 Dec 2022 17:00  Patient On (Oxygen Delivery Method): room air    General: pt. in bed not in distress  HEENT: AT, NC. PERRL. intact EOM. oral mucosa appears dry.   Neck: supple. no JVD.   Chest: air entry fair bilaterally  Heart: S1,S2. RRR. no heart murmur. no edema.  Abdomen: soft. does not appear in pain on abd. palpation, non-distended. + BS.  Ext: no calf tenderness appreciated. some tremors noted in extremities.  Neuro: pt. has eye contact, speaks in very low tone and appears to say " no problem " non focal.   Skin: warm and dry.  vascular : DP 2 + B/L.   psych : somewhat flat affect, no agitation or restlessness, no si/hi.

## 2022-12-05 NOTE — PROGRESS NOTE ADULT - SUBJECTIVE AND OBJECTIVE BOX
[facility]                                        Neu[facility] at [place]                                 [facility]                                  [address]                                        [place], [place], [address]                                             [phone]        CC: altered mental status  and tremor.     HPI:   The patient is a 77y Female with schizoaffective disorder and reported history of dementia. Now presents with "worsening depression."   She reportedly has had worsening tremor recently as well.   She has been described as minimally verbal since arrival.    Interim history:   minimally verbal, no change    ROS:  unable to obtain due to AMS/dementia     MEDICATIONS  (STANDING):  apixaban 5 milliGRAM(s) Oral every 12 hours  levothyroxine 137 MICROGram(s) Oral daily  metoprolol succinate ER 25 milliGRAM(s) Oral daily  oxybutynin 5 milliGRAM(s) Oral daily    Vital Signs Last 24 Hrs  T(C): 36.7 (04 Dec 2022 07:23), Max: 37.1 (03 Dec 2022 15:12)  T(F): 98 (04 Dec 2022 07:23), Max: 98.7 (03 Dec 2022 15:12)  HR: 64 (04 Dec 2022 07:23) (64 - 85)  BP: 105/67 (04 Dec 2022 07:23) (95/65 - 127/73)  BP(mean): 93 (04 Dec 2022 05:00) (93 - 93)  RR: 18 (04 Dec 2022 07:23) (17 - 18)  SpO2: 96% (04 Dec 2022 07:23) (94% - 99%)    Parameters below as of 04 Dec 2022 06:35  Patient On (Oxygen Delivery Method): room air    Detailed Neurologic Exam:    Mental status: The patient is awake and alert. Masked expression. Hypophonic speech. minimal speech output Follows simple instructions.    Cranial nerves: Pupils equal and react symmetrically to light. There is no visual field deficit to threat. Extraocular motion is full with no nystagmus. Facial sensation is intact. Facial musculature is symmetric. Palate elevates symmetrically. Tongue is midline.    Motor: There is some cogwheeling of the wrists with reinforcement. There is moderate tremor of the extremities at rest.   Strength is symmetric in the upper extremities and lower extremities although diffusely weak.    Sensation: Intact to light touch and pin. There is no extinction to double simultaneous stimulation.    Reflexes: 1+ throughout and plantar responses are flexor.    Cerebellar: Cannot be tested.     Labs:                           10.9   6.36  )-----------( 312      ( 03 Dec 2022 15:24 )             32.8     12-03    136  |  102  |  24.0<H>  ----------------------------<  113<H>  3.5   |  25.0  |  0.53    Ca    9.2      03 Dec 2022 15:24    TPro  6.7  /  Alb  3.6  /  TBili  0.4  /  DBili  x   /  AST  17  /  ALT  16  /  AlkPhos  61  12-03    LIVER FUNCTIONS - ( 03 Dec 2022 15:24 )  Alb: 3.6 g/dL / Pro: 6.7 g/dL / ALK PHOS: 61 U/L / ALT: 16 U/L / AST: 17 U/L / GGT: x

## 2022-12-05 NOTE — ED CDU PROVIDER SUBSEQUENT DAY NOTE - NSICDXPASTMEDICALHX_GEN_ALL_CORE_FT
PAST MEDICAL HISTORY:  Dementia per daughter    Hypothyroid     

## 2022-12-05 NOTE — PROGRESS NOTE ADULT - ASSESSMENT
77y Female with psychiatric disease and reported dementia now presenting with worsening of condition.   She has marked extrapyramidal findings as well.    Altered mental status.   No acute medical issues to explain worsening.   May be a progression of dementia and psychiatric condition.     Tremor  Likely extrapyramidal side effects of her medication.   Doubt Parkinson's disease  Would not suggest dopamine agonist as this tends to worsen psychotic symptoms.     Schizoaffective disorder  Per Behavioral Health Team.    Thank you for allowing me to participate in the care of your patient    Renny Esteban MD, PhD   [id number]

## 2022-12-05 NOTE — H&P ADULT - PROBLEM SELECTOR PLAN 1
schizoaffective disorder with declining health, delirium vs worsening dementia , psych team recommended to stop haldol and use ativan.  tremors extra pyramidal side affects from psych meds ? neurology following pt.   awaiting placement.

## 2022-12-05 NOTE — ED ADULT NURSE REASSESSMENT NOTE - COMFORT CARE
plan of care explained/po fluids offered/wait time explained/warm blanket provided
meal provided/repositioned/side rails up

## 2022-12-05 NOTE — H&P ADULT - ASSESSMENT
76 y/o female with schizoaffective disorder with declining health, delirium vs worsening dementia is admitted as she needs placement.

## 2022-12-05 NOTE — H&P ADULT - PROBLEM SELECTOR PLAN 3
clinically appears somewhat dehydrated, po intake not good, assisted feeds requested and iv fluids over night. MVI added.

## 2022-12-05 NOTE — ED BEHAVIORAL HEALTH PROGRESS NOTE - NSELEVCHRSUICRISK_PSY_ALL_CORE
ERCP with stent removal--to be done ~ 8 weeks from 8/28/2022 last procedure was 9/21?    Per organization's guidelines, even though patient is fully vaccinated will still need Covid screen prior to procedure(s).  Covid screen ordered.  
ERCP with stent removal--to be done ~ 8 weeks from 9/21/2022  Estimated body mass index is 33.97 kg/m² as calculated from the following:    Height as of 9/21/22: 5' 9\" (1.753 m).    Weight as of 9/23/22: 104.3 kg (230 lb).    Per organization's guidelines, even though patient is fully vaccinated will still need Covid screen prior to procedure(s).  Covid screen ordered.  
Pt calling to move his covid test up- CC 11/12 @ [id number]  
Pt left message to schedule.  
Scheduled procedure with Patient at      Telephone Information:   Mobile [phone]      Scheduled Via: Case Request Order for  [facility]   Procedure date: 11/15   Procedure time: TBD ; Did patient request this specific time? n/a    -If non-ambulatory location, select reason: Medically necessary  -Did patient request a specific facility other than MD's preferred facility? n/a  -If a MAC pt is scheduled, pt was notified a family member/friend is need to stay with the patient over night after their procedure: Yes                Notified patient about receiving an animated Anuja program? Yes    Was patient informed of COVID testing Yes;    The following have been confirmed:  Insurance name confirmed as JAMEL KHOURY, will be the same at time of procedure?: Yes Ins Accepted at Facility? Yes  Latex Allergy No  Diabetic No  Sleep Apnea No  Diuretic/Water pill Yes  Defibrillator/Pacemaker No  MRSA hx No  Blood thinners: Coumadin (Warfarin) or Plavix No      Aspirin No      Phentermine (diet pill) No  Constipation n/a;    Pre-Op testing required n/a, Patient informed NA  Prep required? n/a; Briefly reviewed? n/a; Prep cost range discussed? n/a  If procedure is scheduled 7 days or less, patient was told to  prep letter?: n/a  
No

## 2022-12-05 NOTE — ED CDU PROVIDER SUBSEQUENT DAY NOTE - HISTORY
No events overnight awaiting reassessment by psychiatry for disposition
no acute events
No events overnight awaiting reassessment by psychiatry for disposition

## 2022-12-05 NOTE — ED CDU PROVIDER SUBSEQUENT DAY NOTE - NSICDXPASTSURGICALHX_GEN_ALL_CORE_FT
PAST SURGICAL HISTORY:  History of appendectomy     History of corneal transplant     

## 2022-12-05 NOTE — ED BEHAVIORAL HEALTH PROGRESS NOTE - DETAILS
Planning to speak with daughter, but was not at bedside at time of interview and not available.  team aware

## 2022-12-05 NOTE — ED BEHAVIORAL HEALTH PROGRESS NOTE - OTHER
bilateral tremor (reportedly improved)  minimally cooperative, w/ barely audible speech  flat  Poor  poverty of thoughts

## 2022-12-05 NOTE — ED BEHAVIORAL HEALTH PROGRESS NOTE - DETAILS:
As per RN taking care of patient. Patient is a more responsive today than in prior day.  She was able to eat 1/2 the jens of food with staff assistance. Has been observed looking staff when in stretcher.

## 2022-12-05 NOTE — H&P ADULT - HISTORY OF PRESENT ILLNESS
pt. is a 77 female history of schizoaffective, hypothyroidism, afib, dementia presented to the ER for 2 weeks of worsening depression and decreased activity. pt. was under observation status for placement but due to insurance issues could not get placed and hospitalist team called for admission. pt. has been seen by psychiatry team and also seen and followed by neurology team. pt. speaks in very low tone and not giving much info. seen with  Alex, denies any complaints. pt. resting in bed , no agitation. psych team recommended to stop haldol and use ativan.

## 2022-12-05 NOTE — ED BEHAVIORAL HEALTH PROGRESS NOTE - SUMMARY
Patient a 76 y/o  female, unemployed, domiciled with her daughter, past Psychiatric hx  depressive sx's, has been dx rather recently with Schizoaffective (does not appear to be reliable given late onset, and apparent lack of history of prior psychotic sx's, and may represented medical deterioration/dementia process) disorder, although uncleara  previous [place] hospitalization in September, 2022 for aggressive and erratic behavior, no drug abuse hx, medically has refractory Hypo-thyroidism, A-Fib, was BIBA for not eating for 2 days. Patient is unable to communicate clearly with staff and has visible tremors, with bradykinesis, hypophonia and limited productivity.  Also appears to be deconditioned.  Has also been recently started on Haldol (which has been discontinued) which may be causing EPS.  Patient presents with possible delirium vs worsening dementia vs possible side effect (EPS of haldol)   with possible depressive sx's,  However patient denies any auditory/visual hallucinations no  S/H I/I/P, denies auditory/visual hallucinations, no delusions were elicited.  Possible catatonic features were considered however patient failed to respond to Ativan challenge. Medical work up has been unremarkable.  Patient likely in need EFREN vs continued medical work up.  May consider head MRI.    Plan  1) Consider adding amantadine 100 mg daily for Parkinsonian symptoms vs possible EPS   2) Would avoid Haldol   3) Quetiapine 12.5 mg PO Q 6hrs PRN agitation   4) Would consider stopping/limiting Benzodiazepines as may worsen cognition   5) Will continue to follow

## 2022-12-05 NOTE — ED CDU PROVIDER SUBSEQUENT DAY NOTE - NSICDXFAMILYHX_GEN_ALL_CORE_FT
FAMILY HISTORY:  Father  Still living? Unknown  FHx: hypothyroidism, Age at diagnosis: Age Unknown    

## 2022-12-05 NOTE — ED BEHAVIORAL HEALTH PROGRESS NOTE - ORIENTATION
Difficulty assessing, however patient is able to state she is in hospital, the year is 2022 and the month is December.

## 2022-12-05 NOTE — ED CDU PROVIDER DISPOSITION NOTE - CLINICAL COURSE
unablew to be cared for at home needs admission d/t porolonged obs stay now wityh insurance issues that will require more time to be resolved for ultimate disposition of pt to nursing home vs rehab

## 2022-12-05 NOTE — ED ADULT NURSE REASSESSMENT NOTE - NSIMPLEMENTINTERV_GEN_ALL_ED
Implemented All Fall Risk Interventions:  [place] to call system. Call bell, personal items and telephone within reach. Instruct patient to call for assistance. Room bathroom lighting operational. Non-slip footwear when patient is off stretcher. Physically safe environment: no spills, clutter or unnecessary equipment. Stretcher in lowest position, wheels locked, appropriate side rails in place. Provide visual cue, wrist band, yellow gown, etc. Monitor gait and stability. Monitor for mental status changes and reorient to person, place, and time. Review medications for side effects contributing to fall risk. Reinforce activity limits and safety measures with patient and family.
Implemented All Fall Risk Interventions:  [place] to call system. Call bell, personal items and telephone within reach. Instruct patient to call for assistance. Room bathroom lighting operational. Non-slip footwear when patient is off stretcher. Physically safe environment: no spills, clutter or unnecessary equipment. Stretcher in lowest position, wheels locked, appropriate side rails in place. Provide visual cue, wrist band, yellow gown, etc. Monitor gait and stability. Monitor for mental status changes and reorient to person, place, and time. Review medications for side effects contributing to fall risk. Reinforce activity limits and safety measures with patient and family.

## 2022-12-06 DIAGNOSIS — I10 ESSENTIAL (PRIMARY) HYPERTENSION: ICD-10-CM

## 2022-12-06 DIAGNOSIS — R25.1 TREMOR, UNSPECIFIED: ICD-10-CM

## 2022-12-06 DIAGNOSIS — N32.81 OVERACTIVE BLADDER: ICD-10-CM

## 2022-12-06 LAB
ANION GAP SERPL CALC-SCNC: 11 MMOL/L — SIGNIFICANT CHANGE UP (ref 5–17)
BUN SERPL-MCNC: 14.5 MG/DL — SIGNIFICANT CHANGE UP (ref 8–20)
CALCIUM SERPL-MCNC: 8.8 MG/DL — SIGNIFICANT CHANGE UP (ref 8.4–10.5)
CHLORIDE SERPL-SCNC: 103 MMOL/L — SIGNIFICANT CHANGE UP (ref 96–108)
CO2 SERPL-SCNC: 25 MMOL/L — SIGNIFICANT CHANGE UP (ref 22–29)
CREAT SERPL-MCNC: 0.43 MG/DL — LOW (ref 0.5–1.3)
EGFR: 101 ML/MIN/1.73M2 — SIGNIFICANT CHANGE UP
GLUCOSE SERPL-MCNC: 92 MG/DL — SIGNIFICANT CHANGE UP (ref 70–99)
POTASSIUM SERPL-MCNC: 3.8 MMOL/L — SIGNIFICANT CHANGE UP (ref 3.5–5.3)
POTASSIUM SERPL-SCNC: 3.8 MMOL/L — SIGNIFICANT CHANGE UP (ref 3.5–5.3)
SODIUM SERPL-SCNC: 139 MMOL/L — SIGNIFICANT CHANGE UP (ref 135–145)

## 2022-12-06 PROCEDURE — 99233 SBSQ HOSP IP/OBS HIGH 50: CPT

## 2022-12-06 RX ORDER — LEVOTHYROXINE SODIUM 125 MCG
112 TABLET ORAL DAILY
Refills: 0 | Status: DISCONTINUED | OUTPATIENT
Start: 2022-12-06 | End: 2022-12-14

## 2022-12-06 RX ORDER — EZETIMIBE 10 MG/1
1 TABLET ORAL
Qty: 0 | Refills: 0 | DISCHARGE

## 2022-12-06 RX ORDER — AMANTADINE HCL 100 MG
100 CAPSULE ORAL DAILY
Refills: 0 | Status: DISCONTINUED | OUTPATIENT
Start: 2022-12-06 | End: 2022-12-14

## 2022-12-06 RX ORDER — QUETIAPINE FUMARATE 200 MG/1
12.5 TABLET, FILM COATED ORAL EVERY 6 HOURS
Refills: 0 | Status: DISCONTINUED | OUTPATIENT
Start: 2022-12-06 | End: 2022-12-14

## 2022-12-06 RX ADMIN — APIXABAN 5 MILLIGRAM(S): 2.5 TABLET, FILM COATED ORAL at 05:30

## 2022-12-06 RX ADMIN — Medication 1 TABLET(S): at 12:16

## 2022-12-06 RX ADMIN — Medication 137 MICROGRAM(S): at 05:30

## 2022-12-06 RX ADMIN — APIXABAN 5 MILLIGRAM(S): 2.5 TABLET, FILM COATED ORAL at 17:55

## 2022-12-06 RX ADMIN — Medication 5 MILLIGRAM(S): at 12:15

## 2022-12-06 NOTE — PROGRESS NOTE ADULT - SUBJECTIVE AND OBJECTIVE BOX
HOSPITALIST PROGRESS NOTE    JULIA PHILIPPEDANISH  [id number]  77yFemale    Patient is a 77y old  Female who presents with a chief complaint of schizoaffective disorder (05 Dec 2022 19:51)      SUBJECTIVE:   Chart reviewed since admission.  Patient seen and examined at bedside for  depression, Hypothyroidism, AF with  [place].  Hypophonic; Doesn't like food on tray. Aware of being in hospital though wont answer when asked why  Denies any headache, chest pain, dyspnea, nausea, vomiting.  Talks about going there (gesturing beyond door)      OBJECTIVE:  Vital Signs Last 24 Hrs  T(C): 36.8 (06 Dec 2022 15:22), Max: 37.1 (05 Dec 2022 17:00)  T(F): 98.2 (06 Dec 2022 15:22), Max: 98.8 (05 Dec 2022 17:00)  HR: 67 (06 Dec 2022 15:22) (60 - 77)  BP: 109/77 (06 Dec 2022 15:22) (103/70 - 121/79)   RR: 18 (06 Dec 2022 15:22) (16 - 18)  SpO2: 99% (06 Dec 2022 15:22) (94% - 99%)    Parameters below as of 06 Dec 2022 15:22  Patient On (Oxygen Delivery Method): room air        PHYSICAL EXAMINATION  General: Elderly female, lying on bed; Comfortable  HEENT:  AT NC. EOMI  NECK:  Supple  CVS: Irregular S1 S2  RESP:  CTAB  GI:  Soft nondistended nontender BS+  : No suprapubic tenderness  MSK:  Moves all extremities  CNS:  Awake, withdrawn. Resting tremors bilateral upper extremities - waxing, waning during evaluation and conversation. Needs constant reinforcement. Moves all extremities, speech low volume but fluent. No facial asymmetry No dysmetria on finger nose testing. Gait not tested.  INTEG:  Warm, dry skin. Pretibial edema  PSYCH:  Withdrawn. Maintain eye contact. Depressed    MONITOR:  CAPILLARY BLOOD GLUCOSE            I&O's Summary        12-06    139  |  103  |  14.5  ----------------------------<  92  3.8   |  25.0  |  0.43<L>    Ca    8.8      06 Dec 2022 04:02              Culture:    TTE:    RADIOLOGY        MEDICATIONS  (STANDING):  apixaban 5 milliGRAM(s) Oral every 12 hours  levothyroxine 137 MICROGram(s) Oral daily  metoprolol succinate ER 25 milliGRAM(s) Oral daily  multivitamin 1 Tablet(s) Oral daily  oxybutynin 5 milliGRAM(s) Oral daily      MEDICATIONS  (PRN):  QUEtiapine 12.5 milliGRAM(s) Oral every 6 hours PRN agitation

## 2022-12-06 NOTE — PROGRESS NOTE ADULT - ASSESSMENT
77 year old female with PMH HTN, Dyslipidemia, Hypothyroidism, Atrial Fibrillation, OAB, Depression was brought in by family for worsening depression. Initial work up in ER including CBC, Chest X-Ray, CT head and CT C-Spine all normal. Evaluated by Psychiatry as well as Neurology and patient to be transferred to Rehab Facililty - admitted to medical service pending insurance authorization.    Depression  Hx Schizoaffective disorder with prior hospitalization at [place] in Sept 2022  Haldol discontinued. Trial of BDZ for Catatonia.   - Quetiapine PRN  - Psych follow up    Tremors.  Neurology opines EPS  - Amantadine ordered    AF, rate controlled, on anticoagulation  - Metoprolol and Apixaban    Hypothyroidism  TSH 0.1, T3 70, T4 11.  - Reduce LT4 to  112mcg    OAB.  Yip placed in Emergency Department  - TOV  - Oxybutynin    VTE Prophylaxis  - patient on Apixaban    Disposition - May discharge to NH/EFREN once arrangements in place

## 2022-12-07 LAB
GLUCOSE BLDC GLUCOMTR-MCNC: 149 MG/DL — HIGH (ref 70–99)
SARS-COV-2 RNA SPEC QL NAA+PROBE: SIGNIFICANT CHANGE UP

## 2022-12-07 PROCEDURE — 99233 SBSQ HOSP IP/OBS HIGH 50: CPT

## 2022-12-07 RX ADMIN — APIXABAN 5 MILLIGRAM(S): 2.5 TABLET, FILM COATED ORAL at 06:05

## 2022-12-07 RX ADMIN — APIXABAN 5 MILLIGRAM(S): 2.5 TABLET, FILM COATED ORAL at 17:08

## 2022-12-07 RX ADMIN — Medication 25 MILLIGRAM(S): at 06:05

## 2022-12-07 RX ADMIN — Medication 1 TABLET(S): at 16:24

## 2022-12-07 RX ADMIN — Medication 112 MICROGRAM(S): at 06:05

## 2022-12-07 RX ADMIN — Medication 5 MILLIGRAM(S): at 16:23

## 2022-12-07 RX ADMIN — Medication 100 MILLIGRAM(S): at 16:33

## 2022-12-07 NOTE — PROGRESS NOTE ADULT - ASSESSMENT
77 year old female with PMH HTN, Dyslipidemia, Hypothyroidism, Atrial Fibrillation, OAB, Depression was brought in by family for worsening depression. Initial work up in ER including CBC, Chest X-Ray, CT head and CT C-Spine all normal. Evaluated by Psychiatry as well as Neurology and patient to be transferred to Rehab Facililty - admitted to medical service pending insurance authorization.    Depression-  Hx Schizoaffective disorder with prior hospitalization at [place] in Sept 2022  Haldol discontinued.   - Quetiapine PRN  - Psych follow up    Tremors.  Neurology  EPS  - Amantadine 100 QD    AF,   rate controlled,    anticoagulation  - Metoprolol and Apixaban    Hypothyroidism  TSH 0.1, T3 70, T4 11.  - Reduce LT4 to  112mcg    OAB.  Yip placed in Emergency Department  - TOV  - Oxybutynin    VTE Prophylaxis  - patient on Apixaban    Disposition - May discharge to NH/[facility] once arrangements in place

## 2022-12-07 NOTE — BH CONSULTATION LIAISON PROGRESS NOTE - NSBHCHARTREVIEWVS_PSY_A_CORE FT
Vital Signs Last 24 Hrs  T(C): 36.8 (07 Dec 2022 09:18), Max: 36.9 (06 Dec 2022 20:57)  T(F): 98.2 (07 Dec 2022 09:18), Max: 98.4 (06 Dec 2022 20:57)  HR: 79 (07 Dec 2022 09:18) (67 - 82)  BP: 110/77 (07 Dec 2022 09:18) (99/64 - 128/84)  BP(mean): --  RR: 16 (07 Dec 2022 04:47) (16 - 18)  SpO2: 98% (07 Dec 2022 09:18) (93% - 99%)    Parameters below as of 07 Dec 2022 09:18  Patient On (Oxygen Delivery Method): room air     - - -

## 2022-12-07 NOTE — BH CONSULTATION LIAISON PROGRESS NOTE - CURRENT MEDICATION
MEDICATIONS  (STANDING):  amantadine 100 milliGRAM(s) Oral daily  apixaban 5 milliGRAM(s) Oral every 12 hours  levothyroxine 112 MICROGram(s) Oral daily  metoprolol succinate ER 25 milliGRAM(s) Oral daily  multivitamin 1 Tablet(s) Oral daily  oxybutynin 5 milliGRAM(s) Oral daily    MEDICATIONS  (PRN):  QUEtiapine 12.5 milliGRAM(s) Oral every 6 hours PRN agitation

## 2022-12-07 NOTE — BH CONSULTATION LIAISON PROGRESS NOTE - NSBHASSESSMENTFT_PSY_ALL_CORE
Patient a 78 y/o  female, unemployed, domiciled with her daughter, past Psychiatric hx  depressive sx's, has been dx rather recently with Schizoaffective (does not appear to be reliable given late onset, and apparent lack of history of prior psychotic sx's, and may represented medical deterioration/dementia process) disorder, although uncleara  previous [place] hospitalization in September, 2022 for aggressive and erratic behavior, no drug abuse hx, medically has refractory Hypo-thyroidism, A-Fib, was BIBA for not eating for 2 days. Patient is unable to communicate clearly with staff and has visible tremors, with bradykinesis, hypophonia and limited productivity.  Also appears to be deconditioned.  Has also been recently started on Haldol (which has been discontinued) which may be causing EPS.  Patient presents with possible delirium vs worsening dementia vs possible side effect (EPS of haldol)   with possible depressive sx's,  However patient denies any auditory/visual hallucinations no  S/H I/I/P, denies auditory/visual hallucinations, no delusions were elicited.  Possible catatonic features were considered however patient failed to respond to Ativan challenge. Medical work up has been unremarkable.  Patient likely in need EFREN vs continued medical work up.  May consider head MRI.    Plan  1) Continue amantadine 100 mg daily for Parkinsonian symptoms vs possible EPS  2) Would avoid Haldol   3) Quetiapine 12.5 mg PO Q 6hrs PRN agitation   4) Would consider stopping/limiting Benzodiazepines as may worsen cognition   5) Will continue to follow

## 2022-12-07 NOTE — BH CONSULTATION LIAISON PROGRESS NOTE - NSBHFUPINTERVALHXFT_PSY_A_CORE
Patient a 78 y/o  female, unemployed, domiciled with her daughter, past Psychiatric hx  depressive sx's, has been dx rather recently with Schizoaffective (does not appear to be reliable given late onset, and apparent lack of history of prior psychotic sx's, and may represented medical deterioration/dementia process) disorder, although uncleara  previous [place] hospitalization in September, 2022 for aggressive and erratic behavior, no drug abuse hx, medically has refractory Hypo-thyroidism, A-Fib, was BIBA for not eating for 2 days.    Patient seen today for follow up and found to be sitting up in chair, still with bradykinesia and left sided tremor and hypophonia. Patient oriented to person, place only and minimally verbal with no s/h ideations reported and no AVH

## 2022-12-07 NOTE — PROGRESS NOTE ADULT - SUBJECTIVE AND OBJECTIVE BOX
JULIA NEWBERRY Patient is a 77y old  Female who presents with a chief complaint of schizoaffective disorder (06 Dec 2022 16:09)     HPI:  pt. is a 77 female history of schizoaffective, hypothyroidism, afib, dementia presented to the ER for 2 weeks of worsening depression and decreased activity. pt. was under observation status for placement but due to insurance issues could not get placed and hospitalist team called for admission. pt. has been seen by psychiatry team and also seen and followed by neurology team. pt. speaks in very low tone and not giving much info. seen with  Alex, denies any complaints. pt. resting in bed , no agitation. psych team recommended to stop haldol and use ativan.  (05 Dec 2022 19:51)    The patient was seen and evaluated lyingin bed - very low volume speech-I had to really get close to understand her - but denied any pain or discomfort - - doesn't reply to are you sad or worried   The patient is in no acute distress.  Denied any fever chest pain, palpitations, shortness of breath, abdominal pain, fever, dysuria, cough, edema       I&O's Summary    06 Dec 2022 07:01  -  07 Dec 2022 07:00  --------------------------------------------------------  IN: 0 mL / OUT: 2600 mL / NET: -2600 mL      Allergies    No Known Allergies    Intolerances      HEALTH ISSUES - PROBLEM Dx:  Schizoaffective disorder    Hypothyroidism    Dehydration    Atrial fibrillation    Tremor    OAB (overactive bladder)    HTN (hypertension)          PAST MEDICAL & SURGICAL HISTORY:  Dementia  per daughter      Hypothyroid      Atrial fibrillation      Schizoaffective disorder      History of appendectomy      History of corneal transplant              Vital Signs Last 24 Hrs  T(C): 36.8 (07 Dec 2022 09:18), Max: 36.9 (06 Dec 2022 20:57)  T(F): 98.2 (07 Dec 2022 09:18), Max: 98.4 (06 Dec 2022 20:57)  HR: 79 (07 Dec 2022 09:18) (67 - 82)  BP: 110/77 (07 Dec 2022 09:18) (99/64 - 128/84)  BP(mean): --  RR: 16 (07 Dec 2022 04:47) (16 - 18)  SpO2: 98% (07 Dec 2022 09:18) (93% - 99%)    Parameters below as of 07 Dec 2022 09:18  Patient On (Oxygen Delivery Method): room air    T(C): 36.8 (12-07-22 @ 09:18), Max: 36.9 (12-06-22 @ 20:57)  HR: 79 (12-07-22 @ 09:18) (67 - 82)  BP: 110/77 (12-07-22 @ 09:18) (99/64 - 128/84)  RR: 16 (12-07-22 @ 04:47) (16 - 18)  SpO2: 98% (12-07-22 @ 09:18) (93% - 99%)  Wt(kg): --    PHYSICAL EXAM:    GENERAL: NAD looks depressed flat   HEAD:  Atraumatic, Normocephalic  EYES: EOMI, PERRL, conjunctiva and sclera clear  ENMT:  Moist mucous membranes,  No lesions  NECK: Supple, No JVD, Normal thyroid  NERVOUS SYSTEM:  Alert & can  Move upper and lower extremities; CNS-II-XII  CHEST/LUNG: Clear to auscultation bilaterally; No rales, rhonchi, wheezing,   HEART: Regular rate and rhythm; No murmurs,   ABDOMEN: Soft, Nontender, Nondistended; Bowel sounds present  EXTREMITIES:  Peripheral Pulses, No  cyanosis, or edema  SKIN: No rashes or lesions  psychiatry- mood and affect flat    amantadine 100 milliGRAM(s) Oral daily  apixaban 5 milliGRAM(s) Oral every 12 hours  levothyroxine 112 MICROGram(s) Oral daily  metoprolol succinate ER 25 milliGRAM(s) Oral daily  multivitamin 1 Tablet(s) Oral daily  oxybutynin 5 milliGRAM(s) Oral daily  QUEtiapine 12.5 milliGRAM(s) Oral every 6 hours PRN      LABS:      12-06    139  |  103  |  14.5  ----------------------------<  92  3.8   |  25.0  |  0.43<L>    Ca    8.8      06 Dec 2022 04:02                CAPILLARY BLOOD GLUCOSE      POCT Blood Glucose.: 149 mg/dL (07 Dec 2022 10:17)      RADIOLOGY & ADDITIONAL TESTS:      Consultant notes reviewed    Case discussed with consultant/provider/ family /patient

## 2022-12-08 PROCEDURE — 99233 SBSQ HOSP IP/OBS HIGH 50: CPT

## 2022-12-08 RX ADMIN — Medication 112 MICROGRAM(S): at 05:38

## 2022-12-08 RX ADMIN — Medication 100 MILLIGRAM(S): at 12:57

## 2022-12-08 RX ADMIN — APIXABAN 5 MILLIGRAM(S): 2.5 TABLET, FILM COATED ORAL at 05:38

## 2022-12-08 RX ADMIN — APIXABAN 5 MILLIGRAM(S): 2.5 TABLET, FILM COATED ORAL at 18:11

## 2022-12-08 RX ADMIN — Medication 25 MILLIGRAM(S): at 05:38

## 2022-12-08 RX ADMIN — Medication 5 MILLIGRAM(S): at 12:57

## 2022-12-08 RX ADMIN — Medication 1 TABLET(S): at 12:57

## 2022-12-08 NOTE — PROGRESS NOTE ADULT - SUBJECTIVE AND OBJECTIVE BOX
CC: schizoaffective disorder (07 Dec 2022 10:21)    HPI:  76y/o female with history of schizoaffective, hypothyroidism, afib, dementia presented to the ER for 2 weeks of worsening depression and decreased activity.    INTERVAL HPI/OVERNIGHT EVENTS:  Patient seen and examined with  at bedside.  Patient complaining of abdominal discomfort and constipation.  Patient denies any headache, dizziness, SOB, CP, nausea, vomiting.  Tolerated meals.      Vital Signs Last 24 Hrs  T(C): 36.2 (08 Dec 2022 08:32), Max: 36.6 (07 Dec 2022 17:08)  T(F): 97.1 (08 Dec 2022 08:32), Max: 97.9 (07 Dec 2022 17:08)  HR: 75 (08 Dec 2022 08:32) (74 - 84)  BP: 108/75 (08 Dec 2022 08:32) (108/75 - 126/81)  BP(mean): --  RR: 18 (08 Dec 2022 08:32) (16 - 18)  SpO2: 95% (08 Dec 2022 08:32) (94% - 95%)    Parameters below as of 08 Dec 2022 08:32  Patient On (Oxygen Delivery Method): nasal cannula      I&O's Detail    07 Dec 2022 07:01  -  08 Dec 2022 07:00  --------------------------------------------------------  IN:  Total IN: 0 mL    OUT:    Voided (mL): 900 mL  Total OUT: 900 mL    Total NET: -900 mL      PHYSICAL EXAM:  GENERAL: NAD, hypophonic  HEAD:  Atraumatic, Normocephalic  NECK: Supple, No JVD, Normal thyroid  NERVOUS SYSTEM:  Alert & Oriented X3 (knows she is in the hospital, but unsure of name), Good concentration; Motor Strength 5/5 B/L upper and lower extremities  CHEST/LUNG: Clear to auscultation bilaterally  HEART: Regular rate and rhythm; No murmurs, rubs, or gallops  ABDOMEN: Soft, Nontender, Nondistended; Bowel sounds present; (+) griggs  EXTREMITIES:  2+ Peripheral Pulses, No clubbing, cyanosis, or edema        MEDICATIONS  (STANDING):  amantadine 100 milliGRAM(s) Oral daily  apixaban 5 milliGRAM(s) Oral every 12 hours  levothyroxine 112 MICROGram(s) Oral daily  metoprolol succinate ER 25 milliGRAM(s) Oral daily  multivitamin 1 Tablet(s) Oral daily  oxybutynin 5 milliGRAM(s) Oral daily    MEDICATIONS  (PRN):  QUEtiapine 12.5 milliGRAM(s) Oral every 6 hours PRN agitation         CC: schizoaffective disorder (07 Dec 2022 10:21)    HPI:  76y/o female with history of schizoaffective, hypothyroidism, afib, dementia presented to the ER for 2 weeks of worsening depression and decreased activity.    INTERVAL HPI/OVERNIGHT EVENTS:  Patient seen and examined with  at bedside.  Patient complaining of abdominal discomfort and constipation.  Patient denies any headache, dizziness, SOB, CP, nausea, vomiting.  Tolerated meals.      Vital Signs Last 24 Hrs  T(C): 36.2 (08 Dec 2022 08:32), Max: 36.6 (07 Dec 2022 17:08)  T(F): 97.1 (08 Dec 2022 08:32), Max: 97.9 (07 Dec 2022 17:08)  HR: 75 (08 Dec 2022 08:32) (74 - 84)  BP: 108/75 (08 Dec 2022 08:32) (108/75 - 126/81)  BP(mean): --  RR: 18 (08 Dec 2022 08:32) (16 - 18)  SpO2: 95% (08 Dec 2022 08:32) (94% - 95%)    Parameters below as of 08 Dec 2022 08:32  Patient On (Oxygen Delivery Method): nasal cannula      I&O's Detail    07 Dec 2022 07:01  -  08 Dec 2022 07:00  --------------------------------------------------------  IN:  Total IN: 0 mL    OUT:    Voided (mL): 900 mL  Total OUT: 900 mL    Total NET: -900 mL      PHYSICAL EXAM:  GENERAL: NAD, hypophonic  HEAD:  Atraumatic, Normocephalic  NECK: Supple, No JVD, Normal thyroid  NERVOUS SYSTEM:  Alert & Oriented X2 (knows she is in the hospital, but unsure of name),   Motor Strength 5/5 B/L upper and lower extremities  CHEST/LUNG: Clear to auscultation bilaterally  HEART: Regular rate and rhythm; No murmurs, rubs, or gallops  ABDOMEN: Soft, Nontender, Nondistended; Bowel sounds present; (+) griggs  EXTREMITIES:  2+ Peripheral Pulses, No clubbing, cyanosis, or edema        MEDICATIONS  (STANDING):  amantadine 100 milliGRAM(s) Oral daily  apixaban 5 milliGRAM(s) Oral every 12 hours  levothyroxine 112 MICROGram(s) Oral daily  metoprolol succinate ER 25 milliGRAM(s) Oral daily  multivitamin 1 Tablet(s) Oral daily  oxybutynin 5 milliGRAM(s) Oral daily    MEDICATIONS  (PRN):  QUEtiapine 12.5 milliGRAM(s) Oral every 6 hours PRN agitation

## 2022-12-08 NOTE — PROGRESS NOTE ADULT - ASSESSMENT
77 year old female with PMH HTN, Dyslipidemia, Hypothyroidism, Atrial Fibrillation, OAB, Depression was brought in by family for worsening depression. Initial work up in ER including CBC, Chest X-Ray, CT head and CT C-Spine all normal. Evaluated by Psychiatry as well as Neurology and patient to be transferred to Rehab Facililty - admitted to medical service pending insurance authorization.    Depression-  Hx Schizoaffective disorder with prior hospitalization at [place] in Sept 2022  Haldol discontinued.   - Quetiapine PRN  - Psych follow up    Tremors.  Neurology  EPS  - Amantadine 100 QD, will increase to bid 12/12/22.    AF,   rate controlled,    anticoagulation  - Metoprolol and Apixaban    Hypothyroidism  TSH 0.1, T3 70, T4 11.  - Reduce LT4 to  112mcg    OAB.  Yip placed in Emergency Department  - TOV today  - Oxybutynin    VTE Prophylaxis  - patient on Apixaban    Disposition - May discharge to NH/EFREN once arrangements in place   77 year old female with PMH HTN, Dyslipidemia, Hypothyroidism, Atrial Fibrillation, OAB, Depression was brought in by family for worsening depression. Initial work up in ER including CBC, Chest X-Ray, CT head and CT C-Spine all normal. Evaluated by Psychiatry as well as Neurology and patient to be transferred to Rehab Facililty - admitted to medical service pending insurance authorization.    Depression-- some improvement   Hx Schizoaffective disorder with prior hospitalization at [place] in Sept 2022  Haldol discontinued-  Extra pyramidal symptoms   - Quetiapine PRN  - Psych follow up    Tremors.  Neurology-  EPS  - Amantadine 100 QD, will increase to bid 12/12/22.    AF,   rate controlled,    anticoagulation  - Metoprolol and Apixaban    Hypothyroidism  TSH 0.1, T3 70, T4 11.  - Reduce LT4 to  112mcg    OAB.  Yip placed in Emergency Department  - TOV today  - Oxybutynin    VTE Prophylaxis  - patient on Apixaban    Disposition - May discharge to NH/EFREN once arrangements in place

## 2022-12-09 PROCEDURE — 93971 EXTREMITY STUDY: CPT | Mod: 26,RT

## 2022-12-09 PROCEDURE — 99233 SBSQ HOSP IP/OBS HIGH 50: CPT

## 2022-12-09 RX ADMIN — Medication 100 MILLIGRAM(S): at 12:33

## 2022-12-09 RX ADMIN — Medication 1 TABLET(S): at 12:33

## 2022-12-09 RX ADMIN — APIXABAN 5 MILLIGRAM(S): 2.5 TABLET, FILM COATED ORAL at 05:02

## 2022-12-09 RX ADMIN — Medication 25 MILLIGRAM(S): at 05:02

## 2022-12-09 RX ADMIN — Medication 5 MILLIGRAM(S): at 12:33

## 2022-12-09 RX ADMIN — Medication 112 MICROGRAM(S): at 05:02

## 2022-12-09 RX ADMIN — APIXABAN 5 MILLIGRAM(S): 2.5 TABLET, FILM COATED ORAL at 17:22

## 2022-12-09 NOTE — CHART NOTE - NSCHARTNOTEFT_GEN_A_CORE
Pt seen and examined at bedside for infiltrate to RUE IV site.   Per RN she does not know what was infusing through when the infiltrate occurred.   All vital signs stable  RUE is erythematous, edematous and TTP  Pt still with FROM to affected extremity, palpable pulses, normal color/temperature  Continue cool compresses, elevation  US RUE Pending

## 2022-12-09 NOTE — PROGRESS NOTE ADULT - ASSESSMENT
77 year old female with PMH HTN, Dyslipidemia, Hypothyroidism, Atrial Fibrillation, OAB, Depression was brought in by family for worsening depression. Initial work up in ER including CBC, Chest X-Ray, CT head and CT C-Spine all normal. Evaluated by Psychiatry as well as Neurology and patient to be transferred to Rehab Facility - admitted to medical service pending insurance authorization.    Depression--underlying worsening dementia   Hx Schizoaffective disorder with prior hospitalization at [place] in Sept 2022  Haldol discontinued-  Extra pyramidal symptoms   - Quetiapine PRN q 6 hours  - Psych appreciated     Tremors possibly drug induced- unlikely Parkinson's per neurology   neuro- Amantadine 100 QD, increase to bid on 12/12/22.  haldol discontinued    AF,   rate controlled, full  anticoagulation  - Metoprolol and Apixaban    Hypothyroidism  TSH 0.1, T3 70, T4 11.  - Reduce synthroid to  112mcg QD    OAB.  Yip placed in Emergency Department  - TOV today  - Oxybutynin    VTE Prophylaxis  - patient on Apixaban    Disposition - May discharge to NH/EFREN once arrangements in place

## 2022-12-09 NOTE — PROGRESS NOTE ADULT - SUBJECTIVE AND OBJECTIVE BOX
JULIA NEWBERRY Patient is a 77y old  Female who presents with a chief complaint of schizoaffective disorder (08 Dec 2022 15:18)     HPI:  pt. is a 77 female history of schizoaffective, hypothyroidism, afib, dementia presented to the ER for 2 weeks of worsening depression and decreased activity. pt. was under observation status for placement but due to insurance issues could not get placed and hospitalist team called for admission. pt. has been seen by psychiatry team and also seen and followed by neurology team. pt. speaks in very low tone and not giving much info. seen with  Alex, denies any complaints. pt. resting in bed , no agitation. psych team recommended to stop haldol and use ativan.  (05 Dec 2022 19:51)    The patient was seen and evaluated poor historian- talks very low and doesn't really participate   The patient is in no acute distress.  unreliably denies everything  when asked fever chest pain, palpitations, shortness of breath, abdominal pain, fever, dysuria, cough, edema       I&O's Summary    08 Dec 2022 07:01  -  09 Dec 2022 07:00  --------------------------------------------------------  IN: 150 mL / OUT: 1100 mL / NET: -950 mL      Allergies    No Known Allergies    Intolerances      HEALTH ISSUES - PROBLEM Dx:  Schizoaffective disorder    Hypothyroidism    Dehydration    Atrial fibrillation    Tremor    OAB (overactive bladder)    HTN (hypertension)          PAST MEDICAL & SURGICAL HISTORY:  Dementia  per daughter      Hypothyroid      Atrial fibrillation      Schizoaffective disorder      History of appendectomy      History of corneal transplant              Vital Signs Last 24 Hrs  T(C): 36.4 (09 Dec 2022 09:01), Max: 36.6 (08 Dec 2022 16:08)  T(F): 97.6 (09 Dec 2022 09:01), Max: 97.8 (08 Dec 2022 16:08)  HR: 76 (09 Dec 2022 09:01) (76 - 86)  BP: 110/75 (09 Dec 2022 09:01) (104/71 - 114/78)  BP(mean): --  RR: 18 (09 Dec 2022 09:01) (18 - 18)  SpO2: 98% (09 Dec 2022 09:01) (93% - 98%)    Parameters below as of 09 Dec 2022 09:01  Patient On (Oxygen Delivery Method): room air    T(C): 36.4 (12-09-22 @ 09:01), Max: 36.6 (12-08-22 @ 16:08)  HR: 76 (12-09-22 @ 09:01) (76 - 86)  BP: 110/75 (12-09-22 @ 09:01) (104/71 - 114/78)  RR: 18 (12-09-22 @ 09:01) (18 - 18)  SpO2: 98% (12-09-22 @ 09:01) (93% - 98%)  Wt(kg): --    PHYSICAL EXAM:    GENERAL: NAD, looks depressed  low voice   HEAD:  Atraumatic, Normocephalic  EYES: EOMI, PERRL, conjunctiva and sclera clear  ENMT:  Moist mucous membranes,  No lesions  NECK: Supple, No JVD, Normal thyroid  NERVOUS SYSTEM:  Awake and barely  Moves upper and lower extremities; CNS-II-XII  CHEST/LUNG: Clear to auscultation bilaterally; No rales, rhonchi, wheezing,   HEART: Regular rate and rhythm; No murmurs,   ABDOMEN: Soft, Nontender, Nondistended; Bowel sounds present  EXTREMITIES:  Peripheral Pulses, No  cyanosis, or edema  SKIN: No rashes or lesions  psychiatry- mood and affect anxious    amantadine 100 milliGRAM(s) Oral daily  apixaban 5 milliGRAM(s) Oral every 12 hours  levothyroxine 112 MICROGram(s) Oral daily  metoprolol succinate ER 25 milliGRAM(s) Oral daily  multivitamin 1 Tablet(s) Oral daily  oxybutynin 5 milliGRAM(s) Oral daily  QUEtiapine 12.5 milliGRAM(s) Oral every 6 hours PRN      LABS:                      CAPILLARY BLOOD GLUCOSE          RADIOLOGY & ADDITIONAL TESTS:      Consultant notes reviewed    Case discussed with consultant/provider/ family /patient

## 2022-12-10 LAB
ANION GAP SERPL CALC-SCNC: 13 MMOL/L — SIGNIFICANT CHANGE UP (ref 5–17)
BASOPHILS # BLD AUTO: 0.03 K/UL — SIGNIFICANT CHANGE UP (ref 0–0.2)
BASOPHILS NFR BLD AUTO: 0.3 % — SIGNIFICANT CHANGE UP (ref 0–2)
BUN SERPL-MCNC: 12.9 MG/DL — SIGNIFICANT CHANGE UP (ref 8–20)
CALCIUM SERPL-MCNC: 9.3 MG/DL — SIGNIFICANT CHANGE UP (ref 8.4–10.5)
CHLORIDE SERPL-SCNC: 101 MMOL/L — SIGNIFICANT CHANGE UP (ref 96–108)
CO2 SERPL-SCNC: 23 MMOL/L — SIGNIFICANT CHANGE UP (ref 22–29)
CREAT SERPL-MCNC: 0.56 MG/DL — SIGNIFICANT CHANGE UP (ref 0.5–1.3)
EGFR: 95 ML/MIN/1.73M2 — SIGNIFICANT CHANGE UP
EOSINOPHIL # BLD AUTO: 0.07 K/UL — SIGNIFICANT CHANGE UP (ref 0–0.5)
EOSINOPHIL NFR BLD AUTO: 0.7 % — SIGNIFICANT CHANGE UP (ref 0–6)
GLUCOSE SERPL-MCNC: 120 MG/DL — HIGH (ref 70–99)
HCT VFR BLD CALC: 38.4 % — SIGNIFICANT CHANGE UP (ref 34.5–45)
HGB BLD-MCNC: 12.3 G/DL — SIGNIFICANT CHANGE UP (ref 11.5–15.5)
IMM GRANULOCYTES NFR BLD AUTO: 0.2 % — SIGNIFICANT CHANGE UP (ref 0–0.9)
LYMPHOCYTES # BLD AUTO: 0.97 K/UL — LOW (ref 1–3.3)
LYMPHOCYTES # BLD AUTO: 10.1 % — LOW (ref 13–44)
MCHC RBC-ENTMCNC: 29.1 PG — SIGNIFICANT CHANGE UP (ref 27–34)
MCHC RBC-ENTMCNC: 32 GM/DL — SIGNIFICANT CHANGE UP (ref 32–36)
MCV RBC AUTO: 90.8 FL — SIGNIFICANT CHANGE UP (ref 80–100)
MONOCYTES # BLD AUTO: 0.52 K/UL — SIGNIFICANT CHANGE UP (ref 0–0.9)
MONOCYTES NFR BLD AUTO: 5.4 % — SIGNIFICANT CHANGE UP (ref 2–14)
NEUTROPHILS # BLD AUTO: 7.98 K/UL — HIGH (ref 1.8–7.4)
NEUTROPHILS NFR BLD AUTO: 83.3 % — HIGH (ref 43–77)
PLATELET # BLD AUTO: 319 K/UL — SIGNIFICANT CHANGE UP (ref 150–400)
POTASSIUM SERPL-MCNC: 4.1 MMOL/L — SIGNIFICANT CHANGE UP (ref 3.5–5.3)
POTASSIUM SERPL-SCNC: 4.1 MMOL/L — SIGNIFICANT CHANGE UP (ref 3.5–5.3)
RBC # BLD: 4.23 M/UL — SIGNIFICANT CHANGE UP (ref 3.8–5.2)
RBC # FLD: 13.6 % — SIGNIFICANT CHANGE UP (ref 10.3–14.5)
SODIUM SERPL-SCNC: 137 MMOL/L — SIGNIFICANT CHANGE UP (ref 135–145)
WBC # BLD: 9.59 K/UL — SIGNIFICANT CHANGE UP (ref 3.8–10.5)
WBC # FLD AUTO: 9.59 K/UL — SIGNIFICANT CHANGE UP (ref 3.8–10.5)

## 2022-12-10 PROCEDURE — 99232 SBSQ HOSP IP/OBS MODERATE 35: CPT

## 2022-12-10 RX ADMIN — APIXABAN 5 MILLIGRAM(S): 2.5 TABLET, FILM COATED ORAL at 17:05

## 2022-12-10 RX ADMIN — Medication 100 MILLIGRAM(S): at 11:29

## 2022-12-10 RX ADMIN — APIXABAN 5 MILLIGRAM(S): 2.5 TABLET, FILM COATED ORAL at 06:04

## 2022-12-10 RX ADMIN — Medication 25 MILLIGRAM(S): at 06:04

## 2022-12-10 RX ADMIN — Medication 112 MICROGRAM(S): at 06:05

## 2022-12-10 RX ADMIN — Medication 1 TABLET(S): at 11:29

## 2022-12-10 RX ADMIN — Medication 5 MILLIGRAM(S): at 11:29

## 2022-12-10 NOTE — PROGRESS NOTE ADULT - SUBJECTIVE AND OBJECTIVE BOX
[facility] Division of Hospital Medicine    Chief Complaint:  Schizoaffective disorder    SUBJECTIVE / OVERNIGHT EVENTS: PAtient seen and examined. Minimally conversant. Tells me her name. Offers no complaints.     Patient denies chest pain, SOB, abd pain, N/V, fever, chills, dysuria or any other complaints. All remainder ROS negative.     MEDICATIONS  (STANDING):  amantadine 100 milliGRAM(s) Oral daily  apixaban 5 milliGRAM(s) Oral every 12 hours  levothyroxine 112 MICROGram(s) Oral daily  metoprolol succinate ER 25 milliGRAM(s) Oral daily  multivitamin 1 Tablet(s) Oral daily  oxybutynin 5 milliGRAM(s) Oral daily    MEDICATIONS  (PRN):  QUEtiapine 12.5 milliGRAM(s) Oral every 6 hours PRN agitation        I&O's Summary      PHYSICAL EXAM:  Vital Signs Last 24 Hrs  T(C): 36.4 (10 Dec 2022 12:27), Max: 36.5 (09 Dec 2022 16:37)  T(F): 97.5 (10 Dec 2022 12:27), Max: 97.7 (09 Dec 2022 16:37)  HR: 83 (10 Dec 2022 12:27) (72 - 83)  BP: 118/76 (10 Dec 2022 12:27) (100/68 - 119/80)  BP(mean): --  RR: 18 (10 Dec 2022 12:27) (18 - 18)  SpO2: 96% (10 Dec 2022 12:27) (94% - 97%)    Parameters below as of 10 Dec 2022 12:27  Patient On (Oxygen Delivery Method): room air            CONSTITUTIONAL: NAD,   ENMT: Moist oral mucosa, no pharyngeal injection or exudates;  RESPIRATORY: Normal respiratory effort; lungs are clear to auscultation bilaterally  CARDIOVASCULAR: Regular rate and rhythm, normal S1 and S2, ; No lower extremity edema  ABDOMEN: Nontender to palpation, normoactive bowel sounds, no rebound/guarding; No hepatosplenomegaly  MUSCLOSKELETAL: no joint swelling or tenderness to palpation, Left arm erythema and warmth  PSYCH: A+O to person, place, and time; affect appropriate  NEUROLOGY: CN 2-12 are intact and symmetric; no gross sensory deficits;   SKIN: left arm near elbow warmth and erythema    LABS:                    CAPILLARY BLOOD GLUCOSE            RADIOLOGY & ADDITIONAL TESTS:  Results Reviewed:   Imaging Personally Reviewed:  Electrocardiogram Personally Reviewed:

## 2022-12-10 NOTE — PROGRESS NOTE ADULT - ASSESSMENT
77 year old female with PMH HTN, Dyslipidemia, Hypothyroidism, Atrial Fibrillation, OAB, Depression was brought in by family for worsening depression. Initial work up in ER including CBC, Chest X-Ray, CT head and CT C-Spine all normal. Evaluated by Psychiatry as well as Neurology and patient to be transferred to Rehab Facility - admitted to medical service pending insurance authorization.    Depression--underlying worsening dementia   Hx Schizoaffective disorder with prior hospitalization at [place] in Sept 2022  Haldol discontinued-  Extra pyramidal symptoms   - Quetiapine PRN q 6 hours  - Psych appreciated     Right arm swelling  US shows thrombophlebitis no dvt  - arm elevation and warm compress  - Check cbc for leukocytosis    Tremors possibly drug induced- unlikely Parkinson's per neurology   - Amantadine 100 QD, increase to bid on 12/12/22.  - haldol discontinued    AF,   rate controlled, full  anticoagulation  - Metoprolol and Apixaban    Hypothyroidism  TSH 0.1, T3 70, T4 11.  - Reduce synthroid to 112mcg QD    OAB.  Yip placed in Emergency Department  - Oxybutynin    VTE Prophylaxis  - patient on Apixaban    Disposition - May discharge to NH/EFREN once arrangements in place

## 2022-12-11 LAB
ANION GAP SERPL CALC-SCNC: 11 MMOL/L — SIGNIFICANT CHANGE UP (ref 5–17)
BASOPHILS # BLD AUTO: 0.03 K/UL — SIGNIFICANT CHANGE UP (ref 0–0.2)
BASOPHILS NFR BLD AUTO: 0.4 % — SIGNIFICANT CHANGE UP (ref 0–2)
BUN SERPL-MCNC: 10.8 MG/DL — SIGNIFICANT CHANGE UP (ref 8–20)
CALCIUM SERPL-MCNC: 9.2 MG/DL — SIGNIFICANT CHANGE UP (ref 8.4–10.5)
CHLORIDE SERPL-SCNC: 103 MMOL/L — SIGNIFICANT CHANGE UP (ref 96–108)
CO2 SERPL-SCNC: 26 MMOL/L — SIGNIFICANT CHANGE UP (ref 22–29)
CREAT SERPL-MCNC: 0.52 MG/DL — SIGNIFICANT CHANGE UP (ref 0.5–1.3)
EGFR: 97 ML/MIN/1.73M2 — SIGNIFICANT CHANGE UP
EOSINOPHIL # BLD AUTO: 0.15 K/UL — SIGNIFICANT CHANGE UP (ref 0–0.5)
EOSINOPHIL NFR BLD AUTO: 2.1 % — SIGNIFICANT CHANGE UP (ref 0–6)
GLUCOSE SERPL-MCNC: 98 MG/DL — SIGNIFICANT CHANGE UP (ref 70–99)
HCT VFR BLD CALC: 39.4 % — SIGNIFICANT CHANGE UP (ref 34.5–45)
HGB BLD-MCNC: 12.5 G/DL — SIGNIFICANT CHANGE UP (ref 11.5–15.5)
IMM GRANULOCYTES NFR BLD AUTO: 0.4 % — SIGNIFICANT CHANGE UP (ref 0–0.9)
LYMPHOCYTES # BLD AUTO: 1.29 K/UL — SIGNIFICANT CHANGE UP (ref 1–3.3)
LYMPHOCYTES # BLD AUTO: 18.2 % — SIGNIFICANT CHANGE UP (ref 13–44)
MAGNESIUM SERPL-MCNC: 2.1 MG/DL — SIGNIFICANT CHANGE UP (ref 1.8–2.6)
MCHC RBC-ENTMCNC: 29 PG — SIGNIFICANT CHANGE UP (ref 27–34)
MCHC RBC-ENTMCNC: 31.7 GM/DL — LOW (ref 32–36)
MCV RBC AUTO: 91.4 FL — SIGNIFICANT CHANGE UP (ref 80–100)
MONOCYTES # BLD AUTO: 0.58 K/UL — SIGNIFICANT CHANGE UP (ref 0–0.9)
MONOCYTES NFR BLD AUTO: 8.2 % — SIGNIFICANT CHANGE UP (ref 2–14)
NEUTROPHILS # BLD AUTO: 4.99 K/UL — SIGNIFICANT CHANGE UP (ref 1.8–7.4)
NEUTROPHILS NFR BLD AUTO: 70.7 % — SIGNIFICANT CHANGE UP (ref 43–77)
PLATELET # BLD AUTO: 318 K/UL — SIGNIFICANT CHANGE UP (ref 150–400)
POTASSIUM SERPL-MCNC: 4.7 MMOL/L — SIGNIFICANT CHANGE UP (ref 3.5–5.3)
POTASSIUM SERPL-SCNC: 4.7 MMOL/L — SIGNIFICANT CHANGE UP (ref 3.5–5.3)
RBC # BLD: 4.31 M/UL — SIGNIFICANT CHANGE UP (ref 3.8–5.2)
RBC # FLD: 13.5 % — SIGNIFICANT CHANGE UP (ref 10.3–14.5)
SARS-COV-2 RNA SPEC QL NAA+PROBE: SIGNIFICANT CHANGE UP
SODIUM SERPL-SCNC: 139 MMOL/L — SIGNIFICANT CHANGE UP (ref 135–145)
WBC # BLD: 7.07 K/UL — SIGNIFICANT CHANGE UP (ref 3.8–10.5)
WBC # FLD AUTO: 7.07 K/UL — SIGNIFICANT CHANGE UP (ref 3.8–10.5)

## 2022-12-11 PROCEDURE — 99232 SBSQ HOSP IP/OBS MODERATE 35: CPT

## 2022-12-11 RX ADMIN — APIXABAN 5 MILLIGRAM(S): 2.5 TABLET, FILM COATED ORAL at 05:52

## 2022-12-11 RX ADMIN — Medication 5 MILLIGRAM(S): at 17:34

## 2022-12-11 RX ADMIN — Medication 100 MILLIGRAM(S): at 17:33

## 2022-12-11 RX ADMIN — APIXABAN 5 MILLIGRAM(S): 2.5 TABLET, FILM COATED ORAL at 17:33

## 2022-12-11 RX ADMIN — Medication 112 MICROGRAM(S): at 05:52

## 2022-12-11 RX ADMIN — Medication 1 TABLET(S): at 17:33

## 2022-12-11 RX ADMIN — Medication 25 MILLIGRAM(S): at 05:52

## 2022-12-11 NOTE — PROGRESS NOTE ADULT - ASSESSMENT
77 year old female with PMH HTN, Dyslipidemia, Hypothyroidism, Atrial Fibrillation, OAB, Depression was brought in by family for worsening depression. Initial work up in ER including CBC, Chest X-Ray, CT head and CT C-Spine all normal. Evaluated by Psychiatry as well as Neurology and patient to be transferred to Rehab Facility - admitted to medical service pending insurance authorization.    Depression--underlying worsening dementia   Hx Schizoaffective disorder with prior hospitalization at [place] in Sept 2022  Haldol discontinued-  Extra pyramidal symptoms   - Quetiapine PRN q 6 hours  - Psych appreciated     Right arm swelling  US shows thrombophlebitis no dvt  - arm elevation and warm compress  - Check cbc for leukocytosis    Tremors possibly drug induced- unlikely Parkinson's per neurology   - Amantadine 100 QD, increase to bid on 12/12/22.  - haldol discontinued    AF,   rate controlled, full  anticoagulation  - Metoprolol and Apixaban    Hypothyroidism  TSH 0.1, T3 70, T4 11.  - Reduce synthroid to 112mcg QD  - recheck thyroid test in 4-6weeks    OAB.  Yip placed in Emergency Department  - Oxybutynin    VTE Prophylaxis  - patient on Apixaban    Disposition - NH/EFREN pending Auth. Covid reordered     77 year old female with PMH HTN, Dyslipidemia, Hypothyroidism, Atrial Fibrillation, OAB, Depression was brought in by family for worsening depression. Initial work up in ER including CBC, Chest X-Ray, CT head and CT C-Spine all normal. Evaluated by Psychiatry as well as Neurology and patient to be transferred to Rehab Facility - admitted to medical service pending insurance authorization.    Depression--underlying worsening dementia   Hx Schizoaffective disorder with prior hospitalization at [place] in Sept 2022  Haldol discontinued-  Extra pyramidal symptoms   - Quetiapine PRN q 6 hours  - Psych appreciated     Right arm swelling  US shows thrombophlebitis no dvt  no leukocytosis and afebrile.   - arm elevation and warm compress    Tremors possibly drug induced- unlikely Parkinson's per neurology   - Amantadine 100 QD, increase to bid on 12/12/22.  - haldol discontinued    AF,   rate controlled, full  anticoagulation  - Metoprolol and Apixaban    Hypothyroidism  TSH 0.1, T3 70, T4 11.  - Reduce synthroid to 112mcg QD  - recheck thyroid test in 4-6weeks    OAB.  Yip placed in Emergency Department  - Oxybutynin    VTE Prophylaxis  - patient on Apixaban    Disposition - NH/EFREN pending Auth. Covid reordered

## 2022-12-11 NOTE — PROGRESS NOTE ADULT - SUBJECTIVE AND OBJECTIVE BOX
[facility] Division of Hospital Medicine    Chief Complaint:  Schizoaffective disorder    SUBJECTIVE / OVERNIGHT EVENTS: Per rn patient voiding but needs encouragement. No complaints.     Patient denies chest pain, SOB, abd pain, N/V, fever, chills, dysuria or any other complaints. All remainder ROS negative.     MEDICATIONS  (STANDING):  amantadine 100 milliGRAM(s) Oral daily  apixaban 5 milliGRAM(s) Oral every 12 hours  levothyroxine 112 MICROGram(s) Oral daily  metoprolol succinate ER 25 milliGRAM(s) Oral daily  multivitamin 1 Tablet(s) Oral daily  oxybutynin 5 milliGRAM(s) Oral daily    MEDICATIONS  (PRN):  QUEtiapine 12.5 milliGRAM(s) Oral every 6 hours PRN agitation        I&O's Summary    10 Dec 2022 07:01  -  11 Dec 2022 07:00  --------------------------------------------------------  IN: 0 mL / OUT: 475 mL / NET: -475 mL        PHYSICAL EXAM:  Vital Signs Last 24 Hrs  T(C): 36.5 (11 Dec 2022 09:10), Max: 37 (10 Dec 2022 16:26)  T(F): 97.7 (11 Dec 2022 09:10), Max: 98.6 (10 Dec 2022 16:26)  HR: 69 (11 Dec 2022 09:10) (69 - 83)  BP: 101/66 (11 Dec 2022 09:10) (101/66 - 118/76)  BP(mean): --  RR: 18 (11 Dec 2022 09:10) (18 - 18)  SpO2: 98% (11 Dec 2022 09:10) (96% - 98%)    Parameters below as of 11 Dec 2022 09:10  Patient On (Oxygen Delivery Method): room air            CONSTITUTIONAL: NAD,   ENMT: Moist oral mucosa, no pharyngeal injection or exudates;  RESPIRATORY: Normal respiratory effort; lungs are clear to auscultation bilaterally  CARDIOVASCULAR: Regular rate and rhythm, normal S1 and S2, ; No lower extremity edema  ABDOMEN: Nontender to palpation, normoactive bowel sounds, no rebound/guarding; No hepatosplenomegaly  MUSCLOSKELETAL: no joint swelling or tenderness to palpation, Left arm erythema and warmth  PSYCH: A+O to person, place, and time; affect appropriate  NEUROLOGY: CN 2-12 are intact and symmetric; no gross sensory deficits;   SKIN: left arm near elbow warmth and erythema    LABS:                        12.5   7.07  )-----------( 318      ( 11 Dec 2022 07:21 )             39.4     12-11    139  |  103  |  10.8  ----------------------------<  98  4.7   |  26.0  |  0.52    Ca    9.2      11 Dec 2022 07:21  Mg     2.1     12-11                CAPILLARY BLOOD GLUCOSE            RADIOLOGY & ADDITIONAL TESTS:  Results Reviewed:   Imaging Personally Reviewed:  Electrocardiogram Personally Reviewed:

## 2022-12-12 ENCOUNTER — TRANSCRIPTION ENCOUNTER (OUTPATIENT)
Age: 77
End: 2022-12-12

## 2022-12-12 LAB
BASOPHILS # BLD AUTO: 0.04 K/UL — SIGNIFICANT CHANGE UP (ref 0–0.2)
BASOPHILS NFR BLD AUTO: 0.4 % — SIGNIFICANT CHANGE UP (ref 0–2)
EOSINOPHIL # BLD AUTO: 0.15 K/UL — SIGNIFICANT CHANGE UP (ref 0–0.5)
EOSINOPHIL NFR BLD AUTO: 1.5 % — SIGNIFICANT CHANGE UP (ref 0–6)
HCT VFR BLD CALC: 38 % — SIGNIFICANT CHANGE UP (ref 34.5–45)
HGB BLD-MCNC: 12.6 G/DL — SIGNIFICANT CHANGE UP (ref 11.5–15.5)
IMM GRANULOCYTES NFR BLD AUTO: 0.3 % — SIGNIFICANT CHANGE UP (ref 0–0.9)
LYMPHOCYTES # BLD AUTO: 1.96 K/UL — SIGNIFICANT CHANGE UP (ref 1–3.3)
LYMPHOCYTES # BLD AUTO: 20.2 % — SIGNIFICANT CHANGE UP (ref 13–44)
MCHC RBC-ENTMCNC: 29.9 PG — SIGNIFICANT CHANGE UP (ref 27–34)
MCHC RBC-ENTMCNC: 33.2 GM/DL — SIGNIFICANT CHANGE UP (ref 32–36)
MCV RBC AUTO: 90 FL — SIGNIFICANT CHANGE UP (ref 80–100)
MONOCYTES # BLD AUTO: 0.76 K/UL — SIGNIFICANT CHANGE UP (ref 0–0.9)
MONOCYTES NFR BLD AUTO: 7.9 % — SIGNIFICANT CHANGE UP (ref 2–14)
NEUTROPHILS # BLD AUTO: 6.74 K/UL — SIGNIFICANT CHANGE UP (ref 1.8–7.4)
NEUTROPHILS NFR BLD AUTO: 69.7 % — SIGNIFICANT CHANGE UP (ref 43–77)
PLATELET # BLD AUTO: 324 K/UL — SIGNIFICANT CHANGE UP (ref 150–400)
RBC # BLD: 4.22 M/UL — SIGNIFICANT CHANGE UP (ref 3.8–5.2)
RBC # FLD: 13.7 % — SIGNIFICANT CHANGE UP (ref 10.3–14.5)
WBC # BLD: 9.68 K/UL — SIGNIFICANT CHANGE UP (ref 3.8–10.5)
WBC # FLD AUTO: 9.68 K/UL — SIGNIFICANT CHANGE UP (ref 3.8–10.5)

## 2022-12-12 PROCEDURE — 99232 SBSQ HOSP IP/OBS MODERATE 35: CPT

## 2022-12-12 RX ADMIN — APIXABAN 5 MILLIGRAM(S): 2.5 TABLET, FILM COATED ORAL at 17:49

## 2022-12-12 RX ADMIN — Medication 25 MILLIGRAM(S): at 05:48

## 2022-12-12 RX ADMIN — Medication 5 MILLIGRAM(S): at 13:08

## 2022-12-12 RX ADMIN — Medication 1 TABLET(S): at 13:08

## 2022-12-12 RX ADMIN — Medication 112 MICROGRAM(S): at 05:48

## 2022-12-12 RX ADMIN — Medication 100 MILLIGRAM(S): at 13:08

## 2022-12-12 RX ADMIN — APIXABAN 5 MILLIGRAM(S): 2.5 TABLET, FILM COATED ORAL at 05:48

## 2022-12-12 NOTE — DIETITIAN INITIAL EVALUATION ADULT - PERTINENT LABORATORY DATA
12-11    139  |  103  |  10.8  ----------------------------<  98  4.7   |  26.0  |  0.52    Ca    9.2      11 Dec 2022 07:21  Mg     2.1     12-11    A1C with Estimated Average Glucose Result: 5.6 % (09-17-22 @ 04:50)

## 2022-12-12 NOTE — DISCHARGE NOTE PROVIDER - NSDCFUSCHEDAPPT_GEN_ALL_CORE_FT
Bob Vargas  [facility]  [address]  Scheduled Appointment: 01/04/2023

## 2022-12-12 NOTE — DISCHARGE NOTE PROVIDER - HOSPITAL COURSE
Patient is a 77 year old female with a history of schizoaffective, hypothyroidism, AFib, and dementia presented to the ER for 2 weeks of worsening depression and decreased activity. Initial work up in ER including CBC, Chest X-Ray, CT head and CT C-Spine all normal. Evaluated by Psychiatry as well as Neurology and patient to be transferred to Rehab Facility - admitted to medical service pending insurance authorization. Due to EPS, psychiatry discontinued Haldol and started Quetiapine. She is medically stable for discharge.    Patient will not require greater than 120 days of convalescent scare in subacute rehab.

## 2022-12-12 NOTE — DISCHARGE NOTE PROVIDER - NSDCMRMEDTOKEN_GEN_ALL_CORE_FT
amLODIPine 5 mg oral tablet: 1 tab(s) orally once a day  apixaban 5 mg oral tablet: 1 tab(s) orally every 12 hours  ezetimibe 10 mg oral tablet: 1 tab(s) orally once a day  haloperidol 2 mg oral tablet: 1 tab(s) orally 2 times a day  levothyroxine 137 mcg (0.137 mg) oral tablet: 1 tab(s) orally once a day  oxybutynin 5 mg oral tablet: 1 tab(s) orally once a day  sertraline 25 mg oral tablet: 1 tab(s) orally once a day   amantadine 100 mg oral capsule: 1 cap(s) orally once a day  apixaban 5 mg oral tablet: 1 tab(s) orally every 12 hours  ezetimibe 10 mg oral tablet: 1 tab(s) orally once a day  levothyroxine 112 mcg (0.112 mg) oral tablet: 1 tab(s) orally once a day  metoprolol succinate 25 mg oral tablet, extended release: 1 tab(s) orally once a day  Multiple Vitamins oral tablet: 1 tab(s) orally once a day  oxybutynin 5 mg oral tablet: 1 tab(s) orally once a day  QUEtiapine: 12.5 milligram(s) orally every 6 hours, As Needed agitation

## 2022-12-12 NOTE — DISCHARGE NOTE PROVIDER - ATTENDING DISCHARGE PHYSICAL EXAMINATION:
Vital Signs Last 24 Hrs  T(C): 36.9 (14 Dec 2022 05:23), Max: 36.9 (14 Dec 2022 05:23)  T(F): 98.5 (14 Dec 2022 05:23), Max: 98.5 (14 Dec 2022 05:23)  HR: 72 (14 Dec 2022 05:23) (69 - 73)  BP: 103/71 (14 Dec 2022 05:23) (103/71 - 113/74)  BP(mean): --  RR: 19 (14 Dec 2022 05:23) (16 - 19)  SpO2: 95% (14 Dec 2022 05:23) (95% - 97%)    Parameters below as of 14 Dec 2022 05:23  Patient On (Oxygen Delivery Method): room air    PHYSICAL EXAM:  Constitutional: No acute distress, alert and oriented by 3  HEENT: AT/NC, EOMI, PERRLA, Normal conjunctiva, no pharyngeal erythema, moist oral mucosa  Respiratory: CTA BL, equal breath sounds, no crackles or wheezing  Cardiovascular: RRR, no edema  Gastrointestinal: soft, Non-tender, Non-distended + Bowel sounds, no rebound or guarding  Genitourinary: no griggs  Musculoskeletal: No joint edema  Neurological: CN 2-12 grossly intact, no focal deficits  Skin: warm, dry and intact  Psychiatric: normal mood and affect

## 2022-12-12 NOTE — DIETITIAN NUTRITION RISK NOTIFICATION - ADDITIONAL COMMENTS/DIETITIAN RECOMMENDATIONS
Ensure TID to optimize po intake and provide an additional 350 kcal, 20g protein per serving   continue soft and bite sized as tolerated

## 2022-12-12 NOTE — DIETITIAN INITIAL EVALUATION ADULT - ORAL INTAKE PTA/DIET HISTORY
Pt taking meals ~25%. Upon admission pt had constipation but now incontinent. No weight changes noted.

## 2022-12-12 NOTE — PROGRESS NOTE ADULT - SUBJECTIVE AND OBJECTIVE BOX
[facility] Division of Hospital Medicine    Chief Complaint:  schizoaffective disorder    SUBJECTIVE / OVERNIGHT EVENTS: Patient seen and examined. no overnight events Awaiting auth to rehab.     Patient denies chest pain, SOB, abd pain, N/V, fever, chills, dysuria or any other complaints. All remainder ROS negative.     MEDICATIONS  (STANDING):  amantadine 100 milliGRAM(s) Oral daily  apixaban 5 milliGRAM(s) Oral every 12 hours  levothyroxine 112 MICROGram(s) Oral daily  metoprolol succinate ER 25 milliGRAM(s) Oral daily  multivitamin 1 Tablet(s) Oral daily  oxybutynin 5 milliGRAM(s) Oral daily    MEDICATIONS  (PRN):  QUEtiapine 12.5 milliGRAM(s) Oral every 6 hours PRN agitation        I&O's Summary      PHYSICAL EXAM:  Vital Signs Last 24 Hrs  T(C): 36.3 (12 Dec 2022 10:31), Max: 36.6 (11 Dec 2022 20:50)  T(F): 97.4 (12 Dec 2022 10:31), Max: 97.9 (11 Dec 2022 20:50)  HR: 73 (12 Dec 2022 10:31) (70 - 75)  BP: 104/73 (12 Dec 2022 10:31) (102/71 - 113/80)  BP(mean): --  RR: 18 (12 Dec 2022 10:31) (16 - 18)  SpO2: 98% (12 Dec 2022 10:31) (96% - 98%)    Parameters below as of 12 Dec 2022 10:31  Patient On (Oxygen Delivery Method): room air    CONSTITUTIONAL: NAD,   ENMT: Moist oral mucosa, no pharyngeal injection or exudates;  RESPIRATORY: Normal respiratory effort; lungs are clear to auscultation bilaterally  CARDIOVASCULAR: Regular rate and rhythm, normal S1 and S2, ; No lower extremity edema  ABDOMEN: Nontender to palpation, normoactive bowel sounds, no rebound/guarding; No hepatosplenomegaly  MUSCLOSKELETAL: no joint swelling or tenderness to palpation, Left arm erythema and warmth resolved  PSYCH: A+O to person, place, and time; affect appropriate  NEUROLOGY: CN 2-12 are intact and symmetric; no gross sensory deficits;   SKIN: left arm near elbow warmth and erythema    LABS:                        12.6   9.68  )-----------( 324      ( 12 Dec 2022 05:30 )             38.0     12-11    139  |  103  |  10.8  ----------------------------<  98  4.7   |  26.0  |  0.52    Ca    9.2      11 Dec 2022 07:21  Mg     2.1     12-11                CAPILLARY BLOOD GLUCOSE            RADIOLOGY & ADDITIONAL TESTS:  Results Reviewed:   Imaging Personally Reviewed:  Electrocardiogram Personally Reviewed:

## 2022-12-12 NOTE — DISCHARGE NOTE PROVIDER - NSDCCPCAREPLAN_GEN_ALL_CORE_FT
PRINCIPAL DISCHARGE DIAGNOSIS  Diagnosis: Schizoaffective disorder  Assessment and Plan of Treatment: Patient with past medical history of schizoaffective disorder andprior hospitalizations at [facility] in Sept 2022. She is also with Depression with underlying worsening dementia. Haldol was discontinued due to extra pyramidal symptoms. Quetiapine started.      SECONDARY DISCHARGE DIAGNOSES  Diagnosis: Occasional tremors  Assessment and Plan of Treatment: Tremors possibly drug induced- unlikely Parkinson's per neurology. Daily Amantadine dose increased. Haldol discontinued.    Diagnosis: Chronic atrial fibrillation  Assessment and Plan of Treatment: Hear rate controlled. Continue Metoprolol and Apixaban.    Diagnosis: Hypothyroidism  Assessment and Plan of Treatment: Daily Synthroid dose redcued. Please recheck thyroid test in 4-6weeks with PCP.    Diagnosis: OAB (overactive bladder)  Assessment and Plan of Treatment: A griggs placed in Emergency Department. Continue Oxybutynin.

## 2022-12-12 NOTE — DIETITIAN NUTRITION RISK NOTIFICATION - TREATMENT: THE FOLLOWING DIET HAS BEEN RECOMMENDED
Diet, Soft and Bite Sized:   DASH/TLC {Sodium & Cholesterol Restricted} (DASH) (12-05-22 @ 20:08) [Active]

## 2022-12-12 NOTE — DIETITIAN INITIAL EVALUATION ADULT - OTHER INFO
77 year old female with PMH HTN, Dyslipidemia, Hypothyroidism, Atrial Fibrillation, OAB, Depression was brought in by family for worsening depression. Initial work up in ER including CBC, Chest X-Ray, CT head and CT C-Spine all normal. Evaluated by Psychiatry as well as Neurology and patient to be transferred to Rehab Facility - admitted to medical service pending insurance authorization.

## 2022-12-12 NOTE — PROGRESS NOTE ADULT - ASSESSMENT
77 year old female with PMH HTN, Dyslipidemia, Hypothyroidism, Atrial Fibrillation, OAB, Depression was brought in by family for worsening depression. Initial work up in ER including CBC, Chest X-Ray, CT head and CT C-Spine all normal. Evaluated by Psychiatry as well as Neurology and patient to be transferred to Rehab Facility - admitted to medical service pending insurance authorization.    Depression--underlying worsening dementia   Hx Schizoaffective disorder with prior hospitalization at [place] in Sept 2022  Haldol discontinued-  Extra pyramidal symptoms   - Quetiapine PRN q 6 hours  - Psych appreciated     Right arm swelling, resolved  US shows thrombophlebitis no dvt  no leukocytosis and afebrile.   - arm elevation and warm compress    Tremors possibly drug induced- unlikely Parkinson's per neurology   - Amantadine 100 QD, increase to bid on 12/12/22.  - haldol discontinued    AF,   rate controlled, full  anticoagulation  - Metoprolol and Apixaban    Hypothyroidism  TSH 0.1, T3 70, T4 11.  - Reduce synthroid to 112mcg QD  - recheck thyroid test in 4-6weeks    OAB.  Yip placed in Emergency Department  - Oxybutynin    VTE Prophylaxis  - patient on Apixaban    Disposition - NH/EFREN pending Auth. Patient remains discharge ready.

## 2022-12-13 PROCEDURE — 99232 SBSQ HOSP IP/OBS MODERATE 35: CPT

## 2022-12-13 RX ADMIN — Medication 112 MICROGRAM(S): at 05:42

## 2022-12-13 RX ADMIN — QUETIAPINE FUMARATE 12.5 MILLIGRAM(S): 200 TABLET, FILM COATED ORAL at 14:20

## 2022-12-13 RX ADMIN — APIXABAN 5 MILLIGRAM(S): 2.5 TABLET, FILM COATED ORAL at 05:42

## 2022-12-13 RX ADMIN — Medication 1 TABLET(S): at 18:35

## 2022-12-13 RX ADMIN — Medication 100 MILLIGRAM(S): at 18:34

## 2022-12-13 RX ADMIN — APIXABAN 5 MILLIGRAM(S): 2.5 TABLET, FILM COATED ORAL at 18:35

## 2022-12-13 RX ADMIN — Medication 5 MILLIGRAM(S): at 18:34

## 2022-12-13 RX ADMIN — Medication 25 MILLIGRAM(S): at 05:42

## 2022-12-13 NOTE — PROGRESS NOTE ADULT - NUTRITIONAL ASSESSMENT
This patient has been assessed with a concern for Malnutrition and has been determined to have a diagnosis/diagnoses of Moderate protein-calorie malnutrition.    This patient is being managed with:   Diet Soft and Bite Sized-  DASH/TLC {Sodium & Cholesterol Restricted} (DASH)  Entered: Dec  5 2022  8:06PM    
This patient has been assessed with a concern for Malnutrition and has been determined to have a diagnosis/diagnoses of Moderate protein-calorie malnutrition.    This patient is being managed with:   Diet Soft and Bite Sized-  DASH/TLC {Sodium & Cholesterol Restricted} (DASH)  Entered: Dec  5 2022  8:06PM

## 2022-12-13 NOTE — PROGRESS NOTE ADULT - ASSESSMENT
77 year old female with PMH HTN, Dyslipidemia, Hypothyroidism, Atrial Fibrillation, OAB, Depression was brought in by family for worsening depression. Initial work up in ER including CBC, Chest X-Ray, CT head and CT C-Spine all normal. Evaluated by Psychiatry as well as Neurology and patient to be transferred to Rehab Facility - admitted to medical service pending insurance authorization.    Depression and dementia   Hx Schizoaffective disorder with prior hospitalization at [place] in Sept 2022  Haldol discontinued-  Extra pyramidal symptoms   - continue Quetiapine PRN q 6 hours  - Psych appreciated     Right arm swelling, resolved  US shows thrombophlebitis no dvt  no leukocytosis and afebrile.   - arm elevation and warm compress    Tremors possibly drug induced- unlikely Parkinson's per neurology   - Amantadine 100 QD, increase to bid on 12/12/22.  - haldol discontinued    AF,   rate controlled, full  anticoagulation  - Metoprolol and Apixaban    Hypothyroidism  TSH 0.1, T3 70, T4 11.  - Reduce synthroid to 112mcg QD  - recheck thyroid test in 4-6weeks    OAB.  Yip placed in Emergency Department, now removed  - Oxybutynin    VTE Prophylaxis  - patient on Apixaban    Disposition - NH/EFREN pending Auth. Patient remains discharge ready.

## 2022-12-13 NOTE — PROGRESS NOTE ADULT - PROVIDER SPECIALTY LIST ADULT
Hospitalist
Neurology
Neurology
Hospitalist

## 2022-12-13 NOTE — PROGRESS NOTE ADULT - SUBJECTIVE AND OBJECTIVE BOX
[facility] Division of Hospital Medicine    Chief Complaint:  schizoaffective disorder    SUBJECTIVE / OVERNIGHT EVENTS: Patient seen and examined, sitting in chair eating breakfast, calm    Patient denies chest pain, SOB, abd pain, N/V, fever, chills, dysuria or any other complaints. All remainder ROS negative.     MEDICATIONS  (STANDING):  amantadine 100 milliGRAM(s) Oral daily  apixaban 5 milliGRAM(s) Oral every 12 hours  levothyroxine 112 MICROGram(s) Oral daily  metoprolol succinate ER 25 milliGRAM(s) Oral daily  multivitamin 1 Tablet(s) Oral daily  oxybutynin 5 milliGRAM(s) Oral daily    MEDICATIONS  (PRN):  QUEtiapine 12.5 milliGRAM(s) Oral every 6 hours PRN agitation        I&O's Summary      PHYSICAL EXAM:  Vital Signs Last 24 Hrs  T(C): 36.5 (13 Dec 2022 11:06), Max: 37 (13 Dec 2022 04:15)  T(F): 97.7 (13 Dec 2022 11:06), Max: 98.6 (13 Dec 2022 04:15)  HR: 69 (13 Dec 2022 11:06) (69 - 79)  BP: 113/74 (13 Dec 2022 11:06) (107/74 - 128/82)  BP(mean): --  RR: 18 (13 Dec 2022 11:06) (17 - 18)  SpO2: 96% (13 Dec 2022 11:06) (96% - 97%)    Parameters below as of 13 Dec 2022 11:06  Patient On (Oxygen Delivery Method): room air        CONSTITUTIONAL: NAD,   ENMT: Moist oral mucosa, no pharyngeal injection or exudates;  RESPIRATORY: Normal respiratory effort; lungs are clear to auscultation bilaterally  CARDIOVASCULAR: Regular rate and rhythm, normal S1 and S2, ; No lower extremity edema  ABDOMEN: Nontender to palpation, normoactive bowel sounds, no rebound/guarding;   MUSCLOSKELETAL: no joint swelling or tenderness to palpation, Left arm erythema and warmth resolved  PSYCH: A+O to person, place, and time; affect appropriate  NEUROLOGY: CN 2-12 are intact and symmetric; no gross sensory deficits;   SKIN: left arm near elbow warmth and erythema    LABS:                        12.6   9.68  )-----------( 324      ( 12 Dec 2022 05:30 )             38.0                     CAPILLARY BLOOD GLUCOSE            RADIOLOGY & ADDITIONAL TESTS:  Results Reviewed:   Imaging Personally Reviewed:  Electrocardiogram Personally Reviewed:

## 2022-12-13 NOTE — PROGRESS NOTE ADULT - REASON FOR ADMISSION
schizoaffective disorder

## 2022-12-14 ENCOUNTER — TRANSCRIPTION ENCOUNTER (OUTPATIENT)
Age: 77
End: 2022-12-14

## 2022-12-14 VITALS
HEART RATE: 76 BPM | OXYGEN SATURATION: 95 % | SYSTOLIC BLOOD PRESSURE: 112 MMHG | TEMPERATURE: 99 F | DIASTOLIC BLOOD PRESSURE: 76 MMHG | RESPIRATION RATE: 18 BRPM

## 2022-12-14 PROCEDURE — 93971 EXTREMITY STUDY: CPT

## 2022-12-14 PROCEDURE — 93005 ELECTROCARDIOGRAM TRACING: CPT

## 2022-12-14 PROCEDURE — G0378: CPT

## 2022-12-14 PROCEDURE — 71045 X-RAY EXAM CHEST 1 VIEW: CPT

## 2022-12-14 PROCEDURE — 80053 COMPREHEN METABOLIC PANEL: CPT

## 2022-12-14 PROCEDURE — 70450 CT HEAD/BRAIN W/O DYE: CPT | Mod: MA

## 2022-12-14 PROCEDURE — U0005: CPT

## 2022-12-14 PROCEDURE — U0003: CPT

## 2022-12-14 PROCEDURE — 99239 HOSP IP/OBS DSCHRG MGMT >30: CPT

## 2022-12-14 PROCEDURE — 80048 BASIC METABOLIC PNL TOTAL CA: CPT

## 2022-12-14 PROCEDURE — 84436 ASSAY OF TOTAL THYROXINE: CPT

## 2022-12-14 PROCEDURE — 85025 COMPLETE CBC W/AUTO DIFF WBC: CPT

## 2022-12-14 PROCEDURE — 84480 ASSAY TRIIODOTHYRONINE (T3): CPT

## 2022-12-14 PROCEDURE — 87637 SARSCOV2&INF A&B&RSV AMP PRB: CPT

## 2022-12-14 PROCEDURE — 97530 THERAPEUTIC ACTIVITIES: CPT

## 2022-12-14 PROCEDURE — 36415 COLL VENOUS BLD VENIPUNCTURE: CPT

## 2022-12-14 PROCEDURE — 84443 ASSAY THYROID STIM HORMONE: CPT

## 2022-12-14 PROCEDURE — 99285 EMERGENCY DEPT VISIT HI MDM: CPT | Mod: 25

## 2022-12-14 PROCEDURE — 72125 CT NECK SPINE W/O DYE: CPT | Mod: MA

## 2022-12-14 PROCEDURE — 83735 ASSAY OF MAGNESIUM: CPT

## 2022-12-14 PROCEDURE — 96372 THER/PROPH/DIAG INJ SC/IM: CPT

## 2022-12-14 PROCEDURE — 97116 GAIT TRAINING THERAPY: CPT

## 2022-12-14 PROCEDURE — 80307 DRUG TEST PRSMV CHEM ANLYZR: CPT

## 2022-12-14 PROCEDURE — 82962 GLUCOSE BLOOD TEST: CPT

## 2022-12-14 PROCEDURE — 81001 URINALYSIS AUTO W/SCOPE: CPT

## 2022-12-14 RX ORDER — AMLODIPINE BESYLATE 2.5 MG/1
1 TABLET ORAL
Qty: 0 | Refills: 0 | DISCHARGE

## 2022-12-14 RX ORDER — METOPROLOL TARTRATE 50 MG
1 TABLET ORAL
Qty: 0 | Refills: 0 | DISCHARGE
Start: 2022-12-14

## 2022-12-14 RX ORDER — QUETIAPINE FUMARATE 200 MG/1
12.5 TABLET, FILM COATED ORAL
Qty: 0 | Refills: 0 | DISCHARGE
Start: 2022-12-14

## 2022-12-14 RX ORDER — LEVOTHYROXINE SODIUM 125 MCG
1 TABLET ORAL
Qty: 0 | Refills: 0 | DISCHARGE
Start: 2022-12-14

## 2022-12-14 RX ORDER — AMANTADINE HCL 100 MG
1 CAPSULE ORAL
Qty: 0 | Refills: 0 | DISCHARGE
Start: 2022-12-14

## 2022-12-14 RX ORDER — SERTRALINE 25 MG/1
1 TABLET, FILM COATED ORAL
Qty: 0 | Refills: 0 | DISCHARGE

## 2022-12-14 RX ADMIN — Medication 25 MILLIGRAM(S): at 05:42

## 2022-12-14 RX ADMIN — APIXABAN 5 MILLIGRAM(S): 2.5 TABLET, FILM COATED ORAL at 05:42

## 2022-12-14 RX ADMIN — Medication 112 MICROGRAM(S): at 05:41

## 2022-12-14 NOTE — DISCHARGE NOTE NURSING/CASE MANAGEMENT/SOCIAL WORK - PATIENT PORTAL LINK FT
You can access the FollowMyHealth Patient Portal offered by [facility] by registering at the following website: [email or web address]. By joining [facility] FollowMyHealth portal, you will also be able to view your health information using other applications (apps) compatible with our system.

## 2022-12-14 NOTE — DISCHARGE NOTE NURSING/CASE MANAGEMENT/SOCIAL WORK - NSDCPEFALRISK_GEN_ALL_CORE
For information on Fall & Injury Prevention, visit: [email or web address] OR  [email or web address] OR  https://www.cdc.gov/steadi/patient.html

## 2022-12-22 NOTE — ED CDU PROVIDER DISPOSITION NOTE - WR ORDER DATE AND TIME 1
01-Dec-2022 16:27 Elidel Counseling: Patient may experience a mild burning sensation during topical application. Elidel is not approved in children less than 2 years of age. There have been case reports of hematologic and skin malignancies in patients using topical calcineurin inhibitors although causality is questionable.

## 2023-01-04 ENCOUNTER — APPOINTMENT (OUTPATIENT)
Dept: FAMILY MEDICINE | Facility: CLINIC | Age: 78
End: 2023-01-04

## 2023-04-17 PROBLEM — I48.91 UNSPECIFIED ATRIAL FIBRILLATION: Chronic | Status: ACTIVE | Noted: 2022-12-05

## 2023-04-17 PROBLEM — F25.9 SCHIZOAFFECTIVE DISORDER, UNSPECIFIED: Chronic | Status: ACTIVE | Noted: 2022-12-05

## 2023-04-19 ENCOUNTER — APPOINTMENT (OUTPATIENT)
Dept: FAMILY MEDICINE | Facility: CLINIC | Age: 78
End: 2023-04-19
Payer: MEDICAID

## 2023-04-19 ENCOUNTER — LABORATORY RESULT (OUTPATIENT)
Age: 78
End: 2023-04-19

## 2023-04-19 VITALS
BODY MASS INDEX: 25.95 KG/M2 | SYSTOLIC BLOOD PRESSURE: 125 MMHG | WEIGHT: 141 LBS | HEIGHT: 62 IN | DIASTOLIC BLOOD PRESSURE: 70 MMHG

## 2023-04-19 DIAGNOSIS — Z09 ENCOUNTER FOR FOLLOW-UP EXAMINATION AFTER COMPLETED TREATMENT FOR CONDITIONS OTHER THAN MALIGNANT NEOPLASM: ICD-10-CM

## 2023-04-19 DIAGNOSIS — F32.A DEPRESSION, UNSPECIFIED: ICD-10-CM

## 2023-04-19 DIAGNOSIS — R25.1 TREMOR, UNSPECIFIED: ICD-10-CM

## 2023-04-19 PROCEDURE — 99215 OFFICE O/P EST HI 40 MIN: CPT | Mod: 25

## 2023-04-19 RX ORDER — SIMVASTATIN 10 MG/1
10 TABLET, FILM COATED ORAL
Qty: 90 | Refills: 0 | Status: DISCONTINUED | COMMUNITY
End: 2023-04-19

## 2023-04-19 RX ORDER — AMLODIPINE BESYLATE 5 MG/1
5 TABLET ORAL DAILY
Qty: 90 | Refills: 0 | Status: DISCONTINUED | COMMUNITY
Start: 2022-10-05 | End: 2023-04-19

## 2023-04-19 RX ORDER — BENZONATATE 100 MG/1
100 CAPSULE ORAL 3 TIMES DAILY
Qty: 12 | Refills: 0 | Status: DISCONTINUED | COMMUNITY
Start: 2022-10-05 | End: 2023-04-19

## 2023-04-19 NOTE — HISTORY OF PRESENT ILLNESS
[FreeTextEntry1] : Follow up [de-identified] : Ms. JULIA ALCANTARA is a pleasant 77 year old female with PMH of schizophrenia, bipolar depression, hypothyroidism s/p thyroid ablation due to hyperthyroidism? managed by endocrinology, AMIE, Jef on Eliquis who comes in to the office with her daughter Arthur for follow up in medical conditions. She got discharged from rehab [facility] on march/31/2023 after being there for 3 months after her hospitlization at [facility] on dec/2022 for weakness, stopping eating, worsening depression Her psych meds were stopped at the hospital. She is better but still slow per Arthur. She saw a neurologist at the hospital.\par \par pressure follow up. I started her on amlodipine 5 mg 2 weeks ago, she is tolerating it Ok.\par \par Per records from last note:\par \par She is seeing a psychiatrist, was referred to [facility] but she is looking for another options yet.\par She sees a  from [facility]: Deedee [phone]\par She saw GI for colon cancer screen last year\par Has declined mammogram multiple times \par \par Endocrinology: Dr Stefany Gutierrez in [facility] [phone]\par Cardio: Birgit Miller\par

## 2023-04-19 NOTE — PLAN
[FreeTextEntry1] : \par HLD\par Now on simvastatin 10 mg QHS from the hospital\par On ezetimibe 10 mg oral\par Checking a lipid panel\par \par HTN\par Controlled\par On Metoprolol ER 25 mg QD\par Off metoprolol now\par Increased hydration advised\par Avoid salt\par Check blood pressures at home\par \par Schizoaffective disorder\par No suicidal/homicidal thoughts\par Good support from her daughter\par Recently discharged from Rehab\par Off medications since hospital discharge on dec/2022\par Referring to our behavioral unit, patient and daughter agree\par Has a .\par \par History of tremors\par Referring to neurology\par \par Osteoporosis\par Last DEXA: 08/26/2021\par Saw rheum Dr Tracy already, pending to follow up\par \par A-fib\par Continue metoprolol\par Continue Eliquis\par Referring again to cardio, was seen by Dr Miller at [facility]\par \par Hypothyroidism\par Check TSH\par On levothyroxine 137 mcg QAM . managed by endocrinology Dr Gutierrez, \par \par Had declined all vaccinations multiple times.\par \par This was an extensive visit that included review of previous labs and specialists notes before and after the face to face encounter \par Return to care: within 3 months for follow up  follow up or earlier if needed\par \par Call or return for any questions

## 2023-04-20 DIAGNOSIS — K08.89 OTHER SPECIFIED DISORDERS OF TEETH AND SUPPORTING STRUCTURES: ICD-10-CM

## 2023-04-20 LAB
ALBUMIN SERPL ELPH-MCNC: 4.3 G/DL
ALP BLD-CCNC: 80 U/L
ALT SERPL-CCNC: 13 U/L
ANION GAP SERPL CALC-SCNC: 14 MMOL/L
AST SERPL-CCNC: 10 U/L
BILIRUB SERPL-MCNC: 0.3 MG/DL
BUN SERPL-MCNC: 13 MG/DL
CALCIUM SERPL-MCNC: 9.8 MG/DL
CHLORIDE SERPL-SCNC: 104 MMOL/L
CHOLEST SERPL-MCNC: 234 MG/DL
CO2 SERPL-SCNC: 23 MMOL/L
CREAT SERPL-MCNC: 0.55 MG/DL
EGFR: 96 ML/MIN/1.73M2
GLUCOSE SERPL-MCNC: 128 MG/DL
HDLC SERPL-MCNC: 48 MG/DL
LDLC SERPL CALC-MCNC: 145 MG/DL
NONHDLC SERPL-MCNC: 186 MG/DL
POTASSIUM SERPL-SCNC: 4.3 MMOL/L
PROT SERPL-MCNC: 7.3 G/DL
SODIUM SERPL-SCNC: 141 MMOL/L
TRIGL SERPL-MCNC: 207 MG/DL
TSH SERPL-ACNC: 0.07 UIU/ML

## 2023-04-20 RX ORDER — HALOPERIDOL 2 MG/1
2 TABLET ORAL TWICE DAILY
Refills: 0 | Status: DISCONTINUED | COMMUNITY
End: 2023-04-20

## 2023-04-28 ENCOUNTER — TRANSCRIPTION ENCOUNTER (OUTPATIENT)
Age: 78
End: 2023-04-28

## 2023-05-04 ENCOUNTER — TRANSCRIPTION ENCOUNTER (OUTPATIENT)
Age: 78
End: 2023-05-04

## 2023-05-10 ENCOUNTER — TRANSCRIPTION ENCOUNTER (OUTPATIENT)
Age: 78
End: 2023-05-10

## 2023-05-15 NOTE — ED ADULT NURSE NOTE - HIV OFFER
Previously Negative (within the last year) Cimzia Counseling:  I discussed with the patient the risks of Cimzia including but not limited to immunosuppression, allergic reactions and infections.  The patient understands that monitoring is required including a PPD at baseline and must alert us or the primary physician if symptoms of infection or other concerning signs are noted.

## 2023-06-02 RX ORDER — APIXABAN 5 MG/1
5 TABLET, FILM COATED ORAL
Qty: 180 | Refills: 0 | Status: ACTIVE | COMMUNITY
Start: 1900-01-01 | End: 1900-01-01

## 2023-06-02 RX ORDER — EZETIMIBE 10 MG/1
10 TABLET ORAL DAILY
Qty: 90 | Refills: 1 | Status: ACTIVE | COMMUNITY
Start: 1900-01-01 | End: 1900-01-01

## 2023-06-02 RX ORDER — NAPROXEN SODIUM 220 MG/1
1000 TABLET ORAL
Qty: 90 | Refills: 0 | Status: ACTIVE | COMMUNITY
Start: 1900-01-01 | End: 1900-01-01

## 2023-06-06 ENCOUNTER — APPOINTMENT (OUTPATIENT)
Dept: RHEUMATOLOGY | Facility: CLINIC | Age: 78
End: 2023-06-06
Payer: MEDICAID

## 2023-06-06 VITALS
HEIGHT: 62 IN | SYSTOLIC BLOOD PRESSURE: 132 MMHG | DIASTOLIC BLOOD PRESSURE: 90 MMHG | HEART RATE: 123 BPM | OXYGEN SATURATION: 98 % | TEMPERATURE: 98.5 F

## 2023-06-06 DIAGNOSIS — M81.0 AGE-RELATED OSTEOPOROSIS W/OUT CURRENT PATHOLOGICAL FRACTURE: ICD-10-CM

## 2023-06-06 PROCEDURE — 99215 OFFICE O/P EST HI 40 MIN: CPT

## 2023-06-06 RX ORDER — AMOXICILLIN AND CLAVULANATE POTASSIUM 875; 125 MG/1; MG/1
875-125 TABLET, COATED ORAL
Qty: 14 | Refills: 0 | Status: DISCONTINUED | COMMUNITY
Start: 2023-04-20 | End: 2023-06-06

## 2023-06-06 RX ORDER — POLYETHYLENE GLYCOL 3350 AND ELECTROLYTES WITH LEMON FLAVOR 236; 22.74; 6.74; 5.86; 2.97 G/4L; G/4L; G/4L; G/4L; G/4L
236 POWDER, FOR SOLUTION ORAL
Qty: 1 | Refills: 0 | Status: DISCONTINUED | COMMUNITY
Start: 2021-10-11 | End: 2023-06-06

## 2023-06-06 RX ORDER — ASPIRIN 81 MG/1
81 TABLET ORAL DAILY
Refills: 0 | Status: DISCONTINUED | COMMUNITY
End: 2023-06-06

## 2023-06-06 NOTE — ASSESSMENT
[FreeTextEntry1] : OP, postmenopausal\par DDD\par \par - labs prior to follow up. repeat DEXA 08/2023\par - Last DEXA 2021: OP. Lowest T score: -2.5 in spine. Was dx with OP in 2001. Was previously on ibandronate?  x 1 \par year?\par - will need dental clearance prior to starting medications\par - d/w pt risks of oral vs IV bisphosphates\par - calcium and vitamin d supplementation. \par - regular exercise: aerobic and resistance\par - fall prevention\par - pain management f/u\par \par Discussed treatment plan with the patient and her daughter. The patient was given the opportunity to ask questions and all questions were answered to their satisfaction.\par

## 2023-06-06 NOTE — PHYSICAL EXAM
[General Appearance - Alert] : alert [General Appearance - In No Acute Distress] : in no acute distress [General Appearance - Well Nourished] : well nourished [General Appearance - Well Developed] : well developed [Sclera] : the sclera and conjunctiva were normal [Outer Ear] : the ears and nose were normal in appearance [Neck Appearance] : the appearance of the neck was normal [Heart Sounds] : normal S1 and S2 [Abdomen Soft] : soft [Musculoskeletal - Swelling] : no joint swelling seen [] : no rash [No Focal Deficits] : no focal deficits [Oriented To Time, Place, And Person] : oriented to person, place, and time [FreeTextEntry1] : using rolling walker

## 2023-06-06 NOTE — HISTORY OF PRESENT ILLNESS
[FreeTextEntry1] : Pt presenting today for a f.u visit for OP. She is accompanied by her daughter who is translating.  \par Last seen 05/2022 and then lost to follow up. Chart reviewed since LV. \par Still pending dental work up/  dental clearance prior to starting medications for OP. \par Has has 2 falls at rehab since LV. No fx.

## 2023-06-20 ENCOUNTER — APPOINTMENT (OUTPATIENT)
Dept: FAMILY MEDICINE | Facility: CLINIC | Age: 78
End: 2023-06-20
Payer: MEDICARE

## 2023-06-20 PROCEDURE — ZZZZZ: CPT

## 2023-06-28 ENCOUNTER — RX CHANGE (OUTPATIENT)
Age: 78
End: 2023-06-28

## 2023-07-19 ENCOUNTER — APPOINTMENT (OUTPATIENT)
Dept: FAMILY MEDICINE | Facility: CLINIC | Age: 78
End: 2023-07-19
Payer: MEDICAID

## 2023-07-19 VITALS
WEIGHT: 141 LBS | HEIGHT: 62 IN | SYSTOLIC BLOOD PRESSURE: 140 MMHG | BODY MASS INDEX: 25.95 KG/M2 | DIASTOLIC BLOOD PRESSURE: 87 MMHG | HEART RATE: 84 BPM | RESPIRATION RATE: 16 BRPM

## 2023-07-19 DIAGNOSIS — I10 ESSENTIAL (PRIMARY) HYPERTENSION: ICD-10-CM

## 2023-07-19 DIAGNOSIS — E03.9 HYPOTHYROIDISM, UNSPECIFIED: ICD-10-CM

## 2023-07-19 DIAGNOSIS — E78.5 HYPERLIPIDEMIA, UNSPECIFIED: ICD-10-CM

## 2023-07-19 PROCEDURE — 99214 OFFICE O/P EST MOD 30 MIN: CPT | Mod: 25

## 2023-07-19 RX ORDER — ELECTROLYTES/DEXTROSE
SOLUTION, ORAL ORAL
Qty: 90 | Refills: 0 | Status: ACTIVE | COMMUNITY
Start: 2023-07-19 | End: 1900-01-01

## 2023-07-19 NOTE — HEALTH RISK ASSESSMENT
[No] : In the past 12 months have you used drugs other than those required for medical reasons? No [No falls in past year] : Patient reported no falls in the past year [Never] : Never [Audit-CScore] : 0

## 2023-07-19 NOTE — PLAN
[FreeTextEntry1] : \par HLD\par Off simvastatin 10 mg QHS (were sent at the hospital last time)\par On ezetimibe 10 mg oral\par Managed by endocrinology, per Rosinaab was told to continue current management. Will need records\par \par HTN\par Controlled\par On Metoprolol ER 25 mg QD\par Off metoprolol now\par Increased hydration advised\par Avoid salt\par Check blood pressures at home\par \par Schizoaffective disorder\par No suicidal/homicidal thoughts\par Good support from her daughter\par Recently discharged from Rehab\par Off medications since hospital discharge on dec/2022\par Referring to our behavioral unit, patient and daughter agree\par Has a .\par \par History of tremors\par Was referred to neurology\par \par Osteoporosis\par Last DEXA: 08/26/2021\par Saw rheum Dr Tracy already, pending to follow up\par \par A-fib\par Continue metoprolol\par Continue Eliquis\par Referring again to cardio, was seen by Dr Miller at [facility]\par \par Hypothyroidism\par Check TSH\par On levothyroxine 137 mcg QAM . managed by endocrinology Dr Gutierrez, \par \par Had declined all vaccinations multiple times.\par \par \par Return to care: within 3 months for follow up  follow up or earlier if needed\par Call or return for any questions

## 2023-07-19 NOTE — HISTORY OF PRESENT ILLNESS
[FreeTextEntry1] : Follow up [de-identified] : Ms. JULIA ALCANTARA is a pleasant 78 year old female with PMH of schizophrenia, bipolar depression, hypothyroidism s/p thyroid ablation due to hyperthyroidism? managed by endocrinology, AMIE, A-fib on Eliquis who comes in to the office with her daughter Arthur for follow up in medical conditions. \par \par Per previous notes:\par Patient got discharged from rehab [facility] on march/31/2023 after being there for 3 months after her hospitalization at [facility] on dec/2022 for weakness, stopping eating, worsening depression Her psych meds were stopped at the hospital. She is better but still slow per Arthur. She saw a neurologist at the hospital.\par \par \par She was referred to [facility] but she is looking for another options yet.\par She sees a  from [facility]: Deedee [phone]\par She saw GI for colon cancer screen last year\par Has declined mammogram multiple times \par \par [place] [facility] [phone]\par Cardio: Birgit Miller\par

## 2023-07-20 LAB
ANION GAP SERPL CALC-SCNC: 13 MMOL/L
BUN SERPL-MCNC: 18 MG/DL
CALCIUM SERPL-MCNC: 9.4 MG/DL
CHLORIDE SERPL-SCNC: 103 MMOL/L
CHOLEST SERPL-MCNC: 323 MG/DL
CO2 SERPL-SCNC: 22 MMOL/L
CREAT SERPL-MCNC: 0.69 MG/DL
EGFR: 90 ML/MIN/1.73M2
GLUCOSE SERPL-MCNC: 108 MG/DL
HDLC SERPL-MCNC: 47 MG/DL
LDLC SERPL CALC-MCNC: 225 MG/DL
NONHDLC SERPL-MCNC: 276 MG/DL
POTASSIUM SERPL-SCNC: 4.8 MMOL/L
SODIUM SERPL-SCNC: 138 MMOL/L
TRIGL SERPL-MCNC: 253 MG/DL
TSH SERPL-ACNC: 1.89 UIU/ML

## 2023-07-21 ENCOUNTER — NON-APPOINTMENT (OUTPATIENT)
Age: 78
End: 2023-07-21

## 2023-08-02 ENCOUNTER — RX CHANGE (OUTPATIENT)
Age: 78
End: 2023-08-02

## 2023-08-02 RX ORDER — OXYBUTYNIN CHLORIDE 5 MG/1
5 TABLET ORAL
Qty: 30 | Refills: 0 | Status: DISCONTINUED | COMMUNITY
End: 2023-08-02

## 2023-08-02 RX ORDER — OXYBUTYNIN CHLORIDE 5 MG/1
5 TABLET ORAL
Qty: 90 | Refills: 1 | Status: ACTIVE | COMMUNITY
Start: 1900-01-01 | End: 1900-01-01

## 2023-09-21 ENCOUNTER — APPOINTMENT (OUTPATIENT)
Dept: NEUROLOGY | Facility: CLINIC | Age: 78
End: 2023-09-21
Payer: MEDICAID

## 2023-09-21 VITALS
HEIGHT: 62 IN | WEIGHT: 135 LBS | BODY MASS INDEX: 24.84 KG/M2 | DIASTOLIC BLOOD PRESSURE: 88 MMHG | SYSTOLIC BLOOD PRESSURE: 120 MMHG

## 2023-09-21 DIAGNOSIS — R26.9 UNSPECIFIED ABNORMALITIES OF GAIT AND MOBILITY: ICD-10-CM

## 2023-09-21 DIAGNOSIS — R41.3 OTHER AMNESIA: ICD-10-CM

## 2023-09-21 DIAGNOSIS — R29.818 OTHER SYMPTOMS AND SIGNS INVOLVING THE NERVOUS SYSTEM: ICD-10-CM

## 2023-09-21 PROCEDURE — 99214 OFFICE O/P EST MOD 30 MIN: CPT

## 2023-10-20 ENCOUNTER — APPOINTMENT (OUTPATIENT)
Dept: FAMILY MEDICINE | Facility: CLINIC | Age: 78
End: 2023-10-20

## 2023-10-26 ENCOUNTER — APPOINTMENT (OUTPATIENT)
Dept: FAMILY MEDICINE | Facility: CLINIC | Age: 78
End: 2023-10-26

## 2024-06-19 ENCOUNTER — EMERGENCY (EMERGENCY)
Facility: HOSPITAL | Age: 79
LOS: 1 days | Discharge: HOME | End: 2024-06-19
Attending: EMERGENCY MEDICINE
Payer: COMMERCIAL

## 2024-06-19 VITALS
RESPIRATION RATE: 18 BRPM | SYSTOLIC BLOOD PRESSURE: 187 MMHG | TEMPERATURE: 97 F | HEIGHT: 66 IN | HEART RATE: 88 BPM | OXYGEN SATURATION: 98 % | DIASTOLIC BLOOD PRESSURE: 136 MMHG | WEIGHT: 184.97 LBS

## 2024-06-19 DIAGNOSIS — Z94.7 CORNEAL TRANSPLANT STATUS: Chronic | ICD-10-CM

## 2024-06-19 DIAGNOSIS — Z90.49 ACQUIRED ABSENCE OF OTHER SPECIFIED PARTS OF DIGESTIVE TRACT: Chronic | ICD-10-CM

## 2024-06-19 LAB
ALBUMIN SERPL ELPH-MCNC: 4 G/DL — SIGNIFICANT CHANGE UP (ref 3.3–5.2)
ALP SERPL-CCNC: 62 U/L — SIGNIFICANT CHANGE UP (ref 40–120)
ALT FLD-CCNC: 15 U/L — SIGNIFICANT CHANGE UP
ANION GAP SERPL CALC-SCNC: 16 MMOL/L — SIGNIFICANT CHANGE UP (ref 5–17)
APPEARANCE UR: CLEAR — SIGNIFICANT CHANGE UP
AST SERPL-CCNC: 19 U/L — SIGNIFICANT CHANGE UP
BACTERIA # UR AUTO: ABNORMAL /HPF
BASOPHILS # BLD AUTO: 0.02 K/UL — SIGNIFICANT CHANGE UP (ref 0–0.2)
BASOPHILS NFR BLD AUTO: 0.2 % — SIGNIFICANT CHANGE UP (ref 0–2)
BILIRUB SERPL-MCNC: 0.3 MG/DL — LOW (ref 0.4–2)
BILIRUB UR-MCNC: NEGATIVE — SIGNIFICANT CHANGE UP
BUN SERPL-MCNC: 31.4 MG/DL — HIGH (ref 8–20)
CALCIUM SERPL-MCNC: 8.9 MG/DL — SIGNIFICANT CHANGE UP (ref 8.4–10.5)
CAST: 3 /LPF — SIGNIFICANT CHANGE UP (ref 0–4)
CHLORIDE SERPL-SCNC: 101 MMOL/L — SIGNIFICANT CHANGE UP (ref 96–108)
CO2 SERPL-SCNC: 20 MMOL/L — LOW (ref 22–29)
COLOR SPEC: YELLOW — SIGNIFICANT CHANGE UP
CREAT SERPL-MCNC: 0.68 MG/DL — SIGNIFICANT CHANGE UP (ref 0.5–1.3)
DIFF PNL FLD: ABNORMAL
EGFR: 90 ML/MIN/1.73M2 — SIGNIFICANT CHANGE UP
EOSINOPHIL # BLD AUTO: 0.07 K/UL — SIGNIFICANT CHANGE UP (ref 0–0.5)
EOSINOPHIL NFR BLD AUTO: 0.8 % — SIGNIFICANT CHANGE UP (ref 0–6)
GLUCOSE SERPL-MCNC: 148 MG/DL — HIGH (ref 70–99)
GLUCOSE UR QL: 100 MG/DL
HCT VFR BLD CALC: 35.9 % — SIGNIFICANT CHANGE UP (ref 34.5–45)
HGB BLD-MCNC: 11.8 G/DL — SIGNIFICANT CHANGE UP (ref 11.5–15.5)
IMM GRANULOCYTES NFR BLD AUTO: 0.6 % — SIGNIFICANT CHANGE UP (ref 0–0.9)
KETONES UR-MCNC: 15 MG/DL
LEUKOCYTE ESTERASE UR-ACNC: NEGATIVE — SIGNIFICANT CHANGE UP
LYMPHOCYTES # BLD AUTO: 2.62 K/UL — SIGNIFICANT CHANGE UP (ref 1–3.3)
LYMPHOCYTES # BLD AUTO: 29.9 % — SIGNIFICANT CHANGE UP (ref 13–44)
MCHC RBC-ENTMCNC: 30.1 PG — SIGNIFICANT CHANGE UP (ref 27–34)
MCHC RBC-ENTMCNC: 32.9 GM/DL — SIGNIFICANT CHANGE UP (ref 32–36)
MCV RBC AUTO: 91.6 FL — SIGNIFICANT CHANGE UP (ref 80–100)
MONOCYTES # BLD AUTO: 0.46 K/UL — SIGNIFICANT CHANGE UP (ref 0–0.9)
MONOCYTES NFR BLD AUTO: 5.2 % — SIGNIFICANT CHANGE UP (ref 2–14)
NEUTROPHILS # BLD AUTO: 5.55 K/UL — SIGNIFICANT CHANGE UP (ref 1.8–7.4)
NEUTROPHILS NFR BLD AUTO: 63.3 % — SIGNIFICANT CHANGE UP (ref 43–77)
NITRITE UR-MCNC: NEGATIVE — SIGNIFICANT CHANGE UP
PH UR: 6 — SIGNIFICANT CHANGE UP (ref 5–8)
PLATELET # BLD AUTO: 295 K/UL — SIGNIFICANT CHANGE UP (ref 150–400)
POTASSIUM SERPL-MCNC: 3.9 MMOL/L — SIGNIFICANT CHANGE UP (ref 3.5–5.3)
POTASSIUM SERPL-SCNC: 3.9 MMOL/L — SIGNIFICANT CHANGE UP (ref 3.5–5.3)
PROT SERPL-MCNC: 7.2 G/DL — SIGNIFICANT CHANGE UP (ref 6.6–8.7)
PROT UR-MCNC: NEGATIVE MG/DL — SIGNIFICANT CHANGE UP
RBC # BLD: 3.92 M/UL — SIGNIFICANT CHANGE UP (ref 3.8–5.2)
RBC # FLD: 13 % — SIGNIFICANT CHANGE UP (ref 10.3–14.5)
RBC CASTS # UR COMP ASSIST: 3 /HPF — SIGNIFICANT CHANGE UP (ref 0–4)
SODIUM SERPL-SCNC: 137 MMOL/L — SIGNIFICANT CHANGE UP (ref 135–145)
SP GR SPEC: 1.02 — SIGNIFICANT CHANGE UP (ref 1–1.03)
SQUAMOUS # UR AUTO: 1 /HPF — SIGNIFICANT CHANGE UP (ref 0–5)
T3 SERPL-MCNC: 86 NG/DL — SIGNIFICANT CHANGE UP (ref 80–200)
T4 AB SER-ACNC: 7.3 UG/DL — SIGNIFICANT CHANGE UP (ref 4.5–12)
TSH SERPL-MCNC: 4.17 UIU/ML — SIGNIFICANT CHANGE UP (ref 0.27–4.2)
UROBILINOGEN FLD QL: 0.2 MG/DL — SIGNIFICANT CHANGE UP (ref 0.2–1)
WBC # BLD: 8.77 K/UL — SIGNIFICANT CHANGE UP (ref 3.8–10.5)
WBC # FLD AUTO: 8.77 K/UL — SIGNIFICANT CHANGE UP (ref 3.8–10.5)
WBC UR QL: 1 /HPF — SIGNIFICANT CHANGE UP (ref 0–5)

## 2024-06-19 PROCEDURE — 72125 CT NECK SPINE W/O DYE: CPT | Mod: 26,MC

## 2024-06-19 PROCEDURE — 72170 X-RAY EXAM OF PELVIS: CPT | Mod: 26

## 2024-06-19 PROCEDURE — 99282 EMERGENCY DEPT VISIT SF MDM: CPT

## 2024-06-19 PROCEDURE — 71045 X-RAY EXAM CHEST 1 VIEW: CPT | Mod: 26

## 2024-06-19 PROCEDURE — 70450 CT HEAD/BRAIN W/O DYE: CPT | Mod: 26,MC

## 2024-06-19 PROCEDURE — 93010 ELECTROCARDIOGRAM REPORT: CPT

## 2024-06-19 PROCEDURE — 99285 EMERGENCY DEPT VISIT HI MDM: CPT

## 2024-06-19 RX ORDER — QUETIAPINE FUMARATE 200 MG/1
12.5 TABLET, FILM COATED ORAL ONCE
Refills: 0 | Status: COMPLETED | OUTPATIENT
Start: 2024-06-19 | End: 2024-06-19

## 2024-06-19 RX ORDER — ONDANSETRON 8 MG/1
4 TABLET, FILM COATED ORAL ONCE
Refills: 0 | Status: COMPLETED | OUTPATIENT
Start: 2024-06-19 | End: 2024-06-19

## 2024-06-19 RX ORDER — SODIUM CHLORIDE 9 MG/ML
1000 INJECTION, SOLUTION INTRAVENOUS ONCE
Refills: 0 | Status: COMPLETED | OUTPATIENT
Start: 2024-06-19 | End: 2024-06-19

## 2024-06-19 RX ORDER — METOCLOPRAMIDE HCL 10 MG
10 TABLET ORAL ONCE
Refills: 0 | Status: COMPLETED | OUTPATIENT
Start: 2024-06-19 | End: 2024-06-19

## 2024-06-19 RX ORDER — OLANZAPINE 15 MG/1
5 TABLET, FILM COATED ORAL ONCE
Refills: 0 | Status: DISCONTINUED | OUTPATIENT
Start: 2024-06-19 | End: 2024-06-19

## 2024-06-19 RX ADMIN — QUETIAPINE FUMARATE 12.5 MILLIGRAM(S): 200 TABLET, FILM COATED ORAL at 20:20

## 2024-06-19 RX ADMIN — SODIUM CHLORIDE 1000 MILLILITER(S): 9 INJECTION, SOLUTION INTRAVENOUS at 18:59

## 2024-06-19 RX ADMIN — ONDANSETRON 4 MILLIGRAM(S): 8 TABLET, FILM COATED ORAL at 19:45

## 2024-06-19 RX ADMIN — Medication 10 MILLIGRAM(S): at 21:00

## 2024-06-19 RX ADMIN — ONDANSETRON 4 MILLIGRAM(S): 8 TABLET, FILM COATED ORAL at 18:59

## 2024-06-19 NOTE — ED PROVIDER NOTE - PHYSICAL EXAMINATION
Gen: AOx2  Head: NCAT  HEENT: dry oral mucosa  Lung: CTAB, no respiratory distress  CV: rrr, no murmur, Normal perfusion  Abd: soft, NTND  MSK: No edema, no visible deformities  Neuro: unable to assess  Skin: No rash   Psych: anxious

## 2024-06-19 NOTE — ED ADULT TRIAGE NOTE - CHIEF COMPLAINT QUOTE
as per EMS, pt has h/o hypothyroid and dementia and became combative today. per EMS daughter states pt gets conmbative when she does not take her thyroid medication and is unsure when the pts last does was. pt received 250mg IM pta. pt is lethargic in triage due to medication.

## 2024-06-19 NOTE — ED ADULT NURSE REASSESSMENT NOTE - NS ED NURSE REASSESS COMMENT FT1
Assumed care of patient from JOHN Boucher. Pt is resting in bed. Pt's daughter at bedside. Unable to obtain information from patient due to confusion. Pt is vomiting. MD aware and at bedside. Pt medicated per MD orders. Plan of care ongoing. Pt. safety maintained.

## 2024-06-19 NOTE — ED ADULT NURSE NOTE - OBJECTIVE STATEMENT
Pt is a 79YOF who is here for agitation and confusion, pt has dementia at baseline, pt came in for increased agitation and MD was able to obtain information from the patients daughter that the pt usually also has this type of behavior when she misses her doses of leveothyroxine. Pt is a 79YOF who is here for agitation and confusion, pt has dementia at baseline, pt came in for increased agitation and MD was able to obtain information from the patients daughter that the pt usually also has this type of behavior when she misses her doses of leveothyroxine. As per daughter Ambika Robb, pt has been having a lot more agitation over the last few days, pt today refused to leave the house and EMS had given the pt ketamine to get her to leave, daughter states that patient had a fall last week some time, but she is not sure when because the pt never reported it to her, but she did notice her having a bruise to her right hip and placing biofreeze on the site. Pt typically ambulates with a rolling walker.

## 2024-06-19 NOTE — ED ADULT NURSE NOTE - NSFALLHARMRISKINTERV_ED_ALL_ED

## 2024-06-19 NOTE — ED ADULT NURSE REASSESSMENT NOTE - NS ED NURSE REASSESS COMMENT FT1
Pt nausea has improved. Pt is resting in stretcher comfortably at this time, no apparent distress noted at this time. Pt safety maintained. Pt denies any complaints at this time. Pending CT.

## 2024-06-19 NOTE — CONSULT NOTE ADULT - SUBJECTIVE AND OBJECTIVE BOX
HPI: 79F pmhx schizoaffective, hypothyroid, dementia brought in for acute agitation. Patient tends to get agitated when missing levothyroxine dose, however family could not confirm or deny compliance. Patients grandson told mother she fell within the last week. Not on AC/AP    Vital Signs Last 24 Hrs  T(C): 36.5 (19 Jun 2024 19:49), Max: 36.5 (19 Jun 2024 19:49)  T(F): 97.7 (19 Jun 2024 19:49), Max: 97.7 (19 Jun 2024 19:49)  HR: 76 (19 Jun 2024 23:14) (76 - 88)  BP: 144/97 (19 Jun 2024 19:49) (144/97 - 187/136)  BP(mean): --  RR: 15 (19 Jun 2024 23:14) (15 - 18)  SpO2: 99% (19 Jun 2024 23:14) (98% - 99%)    Parameters below as of 19 Jun 2024 23:14  Patient On (Oxygen Delivery Method): room air        PHYSICAL EXAM:  GENERAL: no acute distress  HEAD:  Atraumatic, normocephalic  NEURO: Pending  CHEST/LUNG: Clear to auscultation bilaterally; no rales, rhonchi, wheezing, or rubs  ABDOMEN: Soft, nontender, nondistended;   EXTREMITIES:  2+ peripheral pulses, no clubbing, cyanosis, or edema  SKIN: Warm, dry; no rashes or lesions      LABS:                        11.8   8.77  )-----------( 295      ( 19 Jun 2024 18:28 )             35.9     06-19    137  |  101  |  31.4<H>  ----------------------------<  148<H>  3.9   |  20.0<L>  |  0.68    Ca    8.9      19 Jun 2024 18:28    TPro  7.2  /  Alb  4.0  /  TBili  0.3<L>  /  DBili  x   /  AST  19  /  ALT  15  /  AlkPhos  62  06-19      Urinalysis Basic - ( 19 Jun 2024 18:28 )    Color: x / Appearance: x / SG: x / pH: x  Gluc: 148 mg/dL / Ketone: x  / Bili: x / Urobili: x   Blood: x / Protein: x / Nitrite: x   Leuk Esterase: x / RBC: x / WBC x   Sq Epi: x / Non Sq Epi: x / Bacteria: x            RADIOLOGY & ADDITIONAL TESTS:       HPI: 79F pmhx schizoaffective, hypothyroid, dementia brought in for acute agitation. Patient tends to get agitated when missing levothyroxine dose, however family could not confirm or deny compliance. Patients grandson told mother she fell within the last week. Not on AC/AP    Vital Signs Last 24 Hrs  T(C): 36.5 (19 Jun 2024 19:49), Max: 36.5 (19 Jun 2024 19:49)  T(F): 97.7 (19 Jun 2024 19:49), Max: 97.7 (19 Jun 2024 19:49)  HR: 76 (19 Jun 2024 23:14) (76 - 88)  BP: 144/97 (19 Jun 2024 19:49) (144/97 - 187/136)  BP(mean): --  RR: 15 (19 Jun 2024 23:14) (15 - 18)  SpO2: 99% (19 Jun 2024 23:14) (98% - 99%)    Parameters below as of 19 Jun 2024 23:14  Patient On (Oxygen Delivery Method): room air        PHYSICAL EXAM:  GENERAL: no acute distress  HEAD:  Atraumatic, normocephalic  NEURO: Pending  CHEST/LUNG: Clear to auscultation bilaterally; no rales, rhonchi, wheezing, or rubs  ABDOMEN: Soft, nontender, nondistended;   EXTREMITIES:  2+ peripheral pulses, no clubbing, cyanosis, or edema  SKIN: Warm, dry; no rashes or lesions      LABS:                        11.8   8.77  )-----------( 295      ( 19 Jun 2024 18:28 )             35.9     06-19    137  |  101  |  31.4<H>  ----------------------------<  148<H>  3.9   |  20.0<L>  |  0.68    Ca    8.9      19 Jun 2024 18:28    TPro  7.2  /  Alb  4.0  /  TBili  0.3<L>  /  DBili  x   /  AST  19  /  ALT  15  /  AlkPhos  62  06-19      Urinalysis Basic - ( 19 Jun 2024 18:28 )    Color: x / Appearance: x / SG: x / pH: x  Gluc: 148 mg/dL / Ketone: x  / Bili: x / Urobili: x   Blood: x / Protein: x / Nitrite: x   Leuk Esterase: x / RBC: x / WBC x   Sq Epi: x / Non Sq Epi: x / Bacteria: x            RADIOLOGY & ADDITIONAL TESTS:    < from: CT Cervical Spine No Cont (06.19.24 @ 22:48) >  IMPRESSION:    CT HEAD:  Smallvolume bilateral frontal subarachnoid hemorrhage. No significant   mass effect. No acute osseous fracture.    CT CERVICAL SPINE:  No acute cervical spine fracture or evidence of traumatic malalignment.   Mild cervical spondylosis.    Findings were discussed with Dr. Thompson  6/19/2024 10:58 PM by Dr. Velasquez with read back confirmation.    --- End of Report ---            NELLY VELASQUEZ DO; Attending Radiologist    < end of copied text >

## 2024-06-19 NOTE — ED PROVIDER NOTE - OBJECTIVE STATEMENT
79 year old female w/PMHx schizoaffective, hypothyroidism, dementia BIBEMS for agitation. On physician assessment patient is confused, not oriented to time nor place. Spoke with daughter at [phone]. States that patient has intermittent episodes of confusion/aggression when she misses her levothyroxine dosage. Daughter not sure if patient has been compliant with home meds. Patient lives at home with daughter, who is the primary care taker of the patient. Denies noticing any fevers, chills, vomiting, abd pain, hematuria, dysuria, recent trauma or falls. Patient ambulates without assistance at baseline. As per daughter, currently not on any AC. NKDA.

## 2024-06-19 NOTE — ED PROVIDER NOTE - PROGRESS NOTE DETAILS
Spoke with daughter at bedside who states she believes pt may have fallen because she noticed a small bruise to right anterior thigh yesterday. On review of prior records pt had possible adverse reaction to Haldol in 2022 where pt had catatonic features, possibly 2/2 Haldol. Daughter states she wants to avoid any antipsychotic medications as a result. Discussed that pt seemed to respond well to PO Seroquel per prior records, daughter amenable to trialing. Rylan Dumont MD Pt threw up Seroquel pill. Will order Reglan. Rylan Dumont MD Spoke with daughter at bedside who states she believes pt may have fallen because she noticed a small bruise to right anterior thigh yesterday. When daughter asked her son (pt's grandson) he told her that he heard her fall "a few days back", although pt had otherwise been walking. Will add CT C-spine and pelvis x-ray.     On review of prior records pt had possible adverse reaction to Haldol in 2022 where pt had catatonic features, possibly 2/2 Haldol. Daughter states she wants to avoid any antipsychotic medications as a result. Discussed that pt seemed to respond well to PO Seroquel per prior records, daughter amenable to trialing. Rylan Dumont MD [place]a: Patient received in signout from Dr. Ware pending labs, CT scan, x-rays.  Patient initially agitated and yelling in the ED.  Patient given medication to assist with her mental status change which she vomited/spit up.  However daughter is at the bedside.  Over the past 2 hours patient has calm down significantly and is at her baseline at the moment.  verbal CT report received from Dr. Madera.  Patient promptly moved to critical care unit for closer monitoring.  Trauma and neurosurgical teams will be contacted for consultation. Estinfa: Patient received in signout from Dr. Ware pending labs, CT scan, x-rays.  Patient initially agitated and yelling in the ED.  Patient given medication to assist with her mental status change which she vomited/spit up.  However daughter is at the bedside.  Over the past 2 hours patient has calm down significantly and is at her baseline at the moment.  verbal CT report received from Dr. Madera.  Patient promptly moved to critical care unit for closer monitoring.  Trauma and neurosurgical teams will be contacted for consultation. Patient daughter at bedside also relates now that her son told her that he heard the patient fall a day or 2 ago.  Patient was noted earlier this evening to have a small bruise along her hip but has full range of motion.  X-rays ordered and no acute fracture noted. Rpt CT head stable. Pt remains calm at this time. Will consult neuro for AMS - ddx for pt's agitation includes but not limited to delirium 2/2 SAH, dementia w/ behavioral disturbance, depression OCTAVIO - patient clear for DC via neurology, neurosurgery, trauma service. Patient daughter, SW, PT contacted. Patient safe for DC home with home PT and home health aides. Pending pickup by patient daughter. VSS. Well appearing. Ready for DC with neurosurgery f.u.

## 2024-06-19 NOTE — ED PROVIDER NOTE - PATIENT PORTAL LINK FT
You can access the FollowMyHealth Patient Portal offered by [facility] by registering at the following website: [email or web address]. By joining [facility]’s FollowMyHealth portal, you will also be able to view your health information using other applications (apps) compatible with our system.

## 2024-06-19 NOTE — ED ADULT TRIAGE NOTE - PAIN RATING/NUMBER SCALE (0-10): ACTIVITY
Addended by: NA MEZA on: 1/4/2024 09:40 AM     Modules accepted: Orders    
0 (no pain/absence of nonverbal indicators of pain)

## 2024-06-19 NOTE — ED PROVIDER NOTE - CARE PLAN
Principal Discharge DX:	Acute delirium   1 Principal Discharge DX:	Acute delirium  Secondary Diagnosis:	Subarachnoid hemorrhage

## 2024-06-19 NOTE — ED PROVIDER NOTE - CARE PROVIDER_API CALL
Marek Crowe  Neurosurgery  [address] [place] [id number]  Phone: [phone]  Fax: [phone]  Follow Up Time: Urgent

## 2024-06-19 NOTE — ED ADULT NURSE REASSESSMENT NOTE - NS ED NURSE REASSESS COMMENT FT1
Pt. received from main ED to CC area, neuro check unremarkable for physical deficit but patient is agitated when receiving care. Daughter at bedside.

## 2024-06-19 NOTE — ED ADULT NURSE REASSESSMENT NOTE - NS ED NURSE REASSESS COMMENT FT1
Pt also placed on female external urinary catheter for further urination. Pt and daughter at bedside made aware and verbalized understanding need for prima-fit catheter.

## 2024-06-19 NOTE — ED PROVIDER NOTE - CLINICAL SUMMARY MEDICAL DECISION MAKING FREE TEXT BOX
79 year old female w/PMHx schizoaffective, hypothyroidism, dementia BIBEMS for agitation. Elevated /136, otherwise HD stable. Patient appears agitated and confused. Will obtain labs including thyroid panel to assess for any metabolic/electrolyte etiologies. Chest xray, UA+culture to assess for possible infection.

## 2024-06-20 VITALS
DIASTOLIC BLOOD PRESSURE: 84 MMHG | SYSTOLIC BLOOD PRESSURE: 157 MMHG | OXYGEN SATURATION: 95 % | RESPIRATION RATE: 19 BRPM | HEART RATE: 79 BPM

## 2024-06-20 LAB — T4 FREE SERPL-MCNC: 1.3 NG/DL — SIGNIFICANT CHANGE UP (ref 0.9–1.8)

## 2024-06-20 PROCEDURE — 87086 URINE CULTURE/COLONY COUNT: CPT

## 2024-06-20 PROCEDURE — 96376 TX/PRO/DX INJ SAME DRUG ADON: CPT

## 2024-06-20 PROCEDURE — T1013: CPT

## 2024-06-20 PROCEDURE — 71045 X-RAY EXAM CHEST 1 VIEW: CPT

## 2024-06-20 PROCEDURE — 80053 COMPREHEN METABOLIC PANEL: CPT

## 2024-06-20 PROCEDURE — 72170 X-RAY EXAM OF PELVIS: CPT

## 2024-06-20 PROCEDURE — 84436 ASSAY OF TOTAL THYROXINE: CPT

## 2024-06-20 PROCEDURE — 73030 X-RAY EXAM OF SHOULDER: CPT | Mod: 26,RT

## 2024-06-20 PROCEDURE — 36415 COLL VENOUS BLD VENIPUNCTURE: CPT

## 2024-06-20 PROCEDURE — 73060 X-RAY EXAM OF HUMERUS: CPT

## 2024-06-20 PROCEDURE — 84443 ASSAY THYROID STIM HORMONE: CPT

## 2024-06-20 PROCEDURE — 99285 EMERGENCY DEPT VISIT HI MDM: CPT | Mod: 25

## 2024-06-20 PROCEDURE — 85025 COMPLETE CBC W/AUTO DIFF WBC: CPT

## 2024-06-20 PROCEDURE — 93005 ELECTROCARDIOGRAM TRACING: CPT

## 2024-06-20 PROCEDURE — 73030 X-RAY EXAM OF SHOULDER: CPT

## 2024-06-20 PROCEDURE — 73060 X-RAY EXAM OF HUMERUS: CPT | Mod: 26,RT

## 2024-06-20 PROCEDURE — 72125 CT NECK SPINE W/O DYE: CPT | Mod: MC

## 2024-06-20 PROCEDURE — 96375 TX/PRO/DX INJ NEW DRUG ADDON: CPT

## 2024-06-20 PROCEDURE — 99284 EMERGENCY DEPT VISIT MOD MDM: CPT

## 2024-06-20 PROCEDURE — 81001 URINALYSIS AUTO W/SCOPE: CPT

## 2024-06-20 PROCEDURE — 84439 ASSAY OF FREE THYROXINE: CPT

## 2024-06-20 PROCEDURE — 70450 CT HEAD/BRAIN W/O DYE: CPT | Mod: 26,MC

## 2024-06-20 PROCEDURE — 84480 ASSAY TRIIODOTHYRONINE (T3): CPT

## 2024-06-20 PROCEDURE — 70450 CT HEAD/BRAIN W/O DYE: CPT | Mod: MC

## 2024-06-20 PROCEDURE — 96374 THER/PROPH/DIAG INJ IV PUSH: CPT

## 2024-06-20 NOTE — CONSULT NOTE ADULT - SUBJECTIVE AND OBJECTIVE BOX
SURGERY CONSULT  ==============================================================================================================  HPI: 79y Female  HPI: 79F w/ PMH dementia, schizoaffective presenting with concern for agitation (patient notes increased agitation for 1.5 weeks prior). Initially agitated on presentation, though has calmed to baseline since. CT head obtained as part of workup demonstrates b/l frontal SAH. Patient's family notes that she does have a bruise to her R hip and likely fell sometime between 7-4 days prior to arrival. Complaining currently only of R shoulder/arm pain.    Primary  A: intact  B: present b/l  C: pulses palpable  D: GCS 14 (-1 confusion); PERRL  E: no gross deformity/hemorrhage      PAST MEDICAL & SURGICAL HISTORY:  Dementia  per daughter      Hypothyroid      Atrial fibrillation      Schizoaffective disorder      History of appendectomy      History of corneal transplant        Home Meds: Home Medications:  amantadine 100 mg oral capsule: 1 cap(s) orally once a day (14 Dec 2022 09:19)  apixaban 5 mg oral tablet: 1 tab(s) orally every 12 hours (20 Sep 2022 13:53)  ezetimibe 10 mg oral tablet: 1 tab(s) orally once a day (06 Dec 2022 08:29)  levothyroxine 112 mcg (0.112 mg) oral tablet: 1 tab(s) orally once a day (14 Dec 2022 09:19)  metoprolol succinate 25 mg oral tablet, extended release: 1 tab(s) orally once a day (14 Dec 2022 09:19)  Multiple Vitamins oral tablet: 1 tab(s) orally once a day (14 Dec 2022 09:19)  oxybutynin 5 mg oral tablet: 1 tab(s) orally once a day (20 Sep 2022 13:53)  QUEtiapine: 12.5 milligram(s) orally every 6 hours, As Needed agitation (14 Dec 2022 09:19)    Allergies: Allergies    Haldol (Anaphylaxis)    Intolerances      Soc:   Advanced Directives: Presumed Full Code     CURRENT MEDICATIONS:   --------------------------------------------------------------------------------------  Neurologic Medications    Respiratory Medications    Cardiovascular Medications    Gastrointestinal Medications    Genitourinary Medications    Hematologic/Oncologic Medications    Antimicrobial/Immunologic Medications    Endocrine/Metabolic Medications    Topical/Other Medications    --------------------------------------------------------------------------------------    VITAL SIGNS, INS/OUTS (last 24 hours):  --------------------------------------------------------------------------------------  ICU Vital Signs Last 24 Hrs  T(C): 36.5 (2024 19:49), Max: 36.5 (2024 19:49)  T(F): 97.7 (2024 19:49), Max: 97.7 (2024 19:49)  HR: 76 (2024 23:14) (76 - 88)  BP: 152/89 (2024 23:14) (144/97 - 187/136)  BP(mean): --  ABP: --  ABP(mean): --  RR: 15 (2024 23:14) (15 - 18)  SpO2: 99% (2024 23:14) (98% - 99%)    O2 Parameters below as of 2024 23:14  Patient On (Oxygen Delivery Method): room air          I&O's Summary    --------------------------------------------------------------------------------------    EXAM:  Constitutional: NAD  HEENT: PERRL, no drainage or redness  Respiratory: respirations even, unlabored on room air  Cardiovascular: RRR; no chest wall tenderness  Gastrointestinal: Soft, non-tender, non-distended  Extremities: No peripheral edema, No cyanosis, clubbing; ecchymosis to R hip (appears several days old)  Vascular: Equal and normal pulses: 2+ peripheral pulses throughout  Neurological: A&O x 1-2, no gross deficits  Psychiatric: Normal mood, normal affect  Musculoskeletal: no C/T/L spine TTP/step offs; pelvis stable to rock, non-tender; R shoulder TTP      LABS  --------------------------------------------------------------------------------------  Labs:  CAPILLARY BLOOD GLUCOSE                              11.8   8.77  )-----------( 295      ( 2024 18:28 )             35.9       Auto Immature Granulocyte %: 0.6 % (24 @ 18:28)  Auto Neutrophil %: 63.3 % (24 @ 18:28)        137  |  101  |  31.4<H>  ----------------------------<  148<H>  3.9   |  20.0<L>  |  0.68      Calcium: 8.9 mg/dL (24 @ 18:28)      LFTs:             7.2  | 0.3  | 19       ------------------[62      ( 2024 18:28 )  4.0  | x    | 15          Lipase:x      Amylase:x             Coags:            Urinalysis Basic - ( 2024 23:27 )    Color: Yellow / Appearance: Clear / S.017 / pH: x  Gluc: x / Ketone: 15 mg/dL  / Bili: Negative / Urobili: 0.2 mg/dL   Blood: x / Protein: Negative mg/dL / Nitrite: Negative   Leuk Esterase: Negative / RBC: 3 /HPF / WBC 1 /HPF   Sq Epi: x / Non Sq Epi: 1 /HPF / Bacteria: Few /HPF          --------------------------------------------------------------------------------------    OTHER LABS    IMAGING RESULTS  < from: CT Cervical Spine No Cont (24 @ 22:48) >  INTERPRETATION:  CLINICAL INFORMATION: Change in mental status    COMPARISON:2022    CONTRAST:  IV Contrast: NONE  Complications: None reported at time of study completion    TECHNIQUE:    CT BRAIN: Serial axial images were obtained from the skull base to the   vertex using multi-slice helical technique. Sagittal and coronal   reformats were obtained.    CT CERVICAL SPINE: Axial images were obtained of the cervical spine using   multislice helical technique. Reformatted coronal and sagittal images   were obtained.    FINDINGS:    CT BRAIN:    There is small volume bilateral frontal subarachnoid hemorrhage. There is   no significant mass effect or shift of the midline. The basal cisterns   are not effaced. There is cerebral and cerebellar volume loss with   prominence of the ventricles, sulci, and cerebellar folia. There are mild   chronic ischemic changes in the frontoparietal white matter. There are   atherosclerotic calcifications of the intracranial carotid arteries.    There is no significant scalp soft tissue swelling or scalp hematoma. The   skull base and bony calvarium are intact. The visualized paranasal   sinuses and tympanic/mastoid cavities are clear apart from minimal   ethmoid mucosal thickening.      CT CERVICAL SPINE:    The bones are demineralized. There is nonspecific straightening of the   cervical spine lordosis. There is no acute cervical spine fracture or   evidence of traumatic malalignment. There is no significant prevertebral   soft tissue swelling/hematoma. There is mild multilevel intervertebral   disc space narrowing, smalldisc bulges and disc osteophyte complexes,   and facet and uncinate hypertrophy. The regional soft tissues of the neck   are otherwise unremarkable. The lung apices demonstrate mild biapical   parenchymal scarring.      IMPRESSION:    CT HEAD:  Smallvolume bilateral frontal subarachnoid hemorrhage. No significant   mass effect. No acute osseous fracture.    CT CERVICAL SPINE:  No acute cervical spine fracture or evidence of traumatic malalignment.   Mild cervical spondylosis.    < end of copied text >

## 2024-06-20 NOTE — CHART NOTE - NSCHARTNOTEFT_GEN_A_CORE
Met with  Patient and daughter Rosina [phone] for discharge planning. Rosina states that they have a  (Marce) from [email or web address] .  ([phone])   called and message left.  Family is requesting Home Care, referrel sent to [facility] at home per family request.

## 2024-06-20 NOTE — ED ADULT NURSE REASSESSMENT NOTE - NS ED NURSE REASSESS COMMENT FT1
Assumed care of pt from night shift RN. Aox1 to self. Pt resting comfortably in stretcher. Pt denies any complaints at the moment. Denies chest pain, SOB, abd pain, back pain, headaches, dizziness, lightheadedness, fevers, chills, nausea, vomiting, diarrhea, constipation and dysuria. Denies chest pain, SOB, abd pain, back pain, headaches, dizziness, lightheadedness, fevers, chills, nausea, vomiting, diarrhea, constipation and dysuria. Assumed care of pt from night shift RN. Aox1 to self. Pt resting comfortably in stretcher. Pt denies any complaints at the moment. Denies chest pain, SOB, abd pain, back pain, headaches, dizziness, lightheadedness, fevers, chills, nausea, vomiting, diarrhea, constipation and dysuria. Pt on cardiac monitor in NSR 89 HR. Plan, abnormal labs, history of present illness, pending labs/tests reviewed.  IPad used.

## 2024-06-20 NOTE — PHYSICAL THERAPY INITIAL EVALUATION ADULT - ADDITIONAL COMMENTS
owns and uses a rollator, 3 steps no rails +7 steps 1 rail to enter home, 5 steps 1 rail to bedroom, daughter states ALWAYS assists on stairs

## 2024-06-20 NOTE — CONSULT NOTE ADULT - ASSESSMENT
79y M/F PMH ** on **blood thinner** presents to [facility] with ** found with   CRANIAL: right frontal tSAH with no midline shift/mass effect    - D/w attending  - No acute neurosurgical intervention  - HOB 30 degrees if cranial  - 6hr CTH for stability, if stable sign off.  - Trauma eval/admit  - Chemical DVT ppx not ok until cleared by NSGY team  - Anticoagulation/Antiplatelet therapy not ok until cleared by NSGY team
ASSESSMENT/ PLAN:  79F presenting with agitation, found to have b/l frontal SAH on workup. Per family, patient noted to have bruise on R hip and was found getting up from floor sometime between 7-4 days prior to arrival (per grandson). Patient also reports fall from sitting (on rolling walker) with +HS on floor. Primary with GCS 14 (baseline), and secondary with R shoulder TTP.     Plan:  -no acute intervention per trauma  -per neurosurgery, to receive repeat CTH at 6 hours  -prn pain control  -would obtain plain films of R arm 2/2 complaints of pain  -if repeat CTH stable, no further intervention per traum and may dc per ED      Attending aware and agrees with plan  
The patient is a 79y Female who is followed by neurology because of agitation last nght, not agitated currently    Delirium  has history of reported dementia and schizoaffective disorder  agitation has resolved  ? gets agitated when misses thyroid medication unsure of recent compliance with meds  Repeat CT head was stable with small tSAH    Thank you for allowing me to participate in the care of your patient    Renny Esteban MD, PhD   [id number]

## 2024-06-20 NOTE — CONSULT NOTE ADULT - SUBJECTIVE AND OBJECTIVE BOX
[facility]                                     Neurology at [place]                                 Eulalia Moses, & Drake                                  [address]                                        [place], [place], [address]                                             [phone]    CC: AMS  HPI: The patient is a 79y Female who presented with confusion and agitation  Per chart she has history of dementia and schizoaffective disorder.  Her family also reported recent fall and head CT showed small traumatic SAH.  Today she is calm, answers appropriately and appears quite happy. It was also noted that she gets agitated when her synthroid is missed- n clear determination as to how compliant with her meds she has been recently.   Neurology is asked to evaluate.    PAST MEDICAL & SURGICAL HISTORY:  Dementia  per daughter      Hypothyroid      Atrial fibrillation      Schizoaffective disorder      History of appendectomy      History of corneal transplant          MEDICATIONS  (STANDING):    MEDICATIONS  (PRN):      Allergies    Haldol (Anaphylaxis)    Intolerances        SOCIAL HISTORY:  no tob,   no alcohol   no drugs    FAMILY HISTORY:  FHx: hypothyroidism (Father)          ROS: 14 point ROS negative other than what is present in HPI or below    Vital Signs Last 24 Hrs  T(C): 36.8 (20 Jun 2024 07:20), Max: 36.8 (20 Jun 2024 02:12)  T(F): 98.2 (20 Jun 2024 07:20), Max: 98.2 (20 Jun 2024 02:12)  HR: 82 (20 Jun 2024 07:20) (76 - 88)  BP: 148/69 (20 Jun 2024 07:20) (121/64 - 187/136)  BP(mean): --  RR: 16 (20 Jun 2024 07:20) (15 - 20)  SpO2: 98% (20 Jun 2024 07:20) (96% - 99%)    Parameters below as of 20 Jun 2024 07:20  Patient On (Oxygen Delivery Method): room air      General: NAD, cheerful affect    Detailed Neurologic Exam:    Mental status: The patient is awake and alert and has normal attention span.  The patient is fully oriented in 3 spheres.The patient is able to name objects, follow commands, repeat sentences.    Cranial nerves: Pupils equal and react symmetrically to light. There is no visual field deficit to confrontation. Extraocular motion is full with no nystagmus. There is no ptosis. Facial sensation is intact. Facial musculature is symmetric.     Motor: There is normal bulk and tone.  There is no tremor.  No focal wekaness, moves ext symx4    Sensation: Intact to light touch and pin in 4 extremities    Reflexes: 2+ biceps/BR/patellar DTR and plantar responses are flexor.    Cerebellar: There is no dysmetria on finger to nose testing.    Gait : deferred    LABS:                         11.8   8.77  )-----------( 295      ( 19 Jun 2024 18:28 )             35.9       06-19    137  |  101  |  31.4<H>  ----------------------------<  148<H>  3.9   |  20.0<L>  |  0.68    Ca    8.9      19 Jun 2024 18:28    TPro  7.2  /  Alb  4.0  /  TBili  0.3<L>  /  DBili  x   /  AST  19  /  ALT  15  /  AlkPhos  62  06-19    RADIOLOGY & ADDITIONAL STUDIES (independently reviewed unless otherwise noted):  CT Head No Cont (06.20.24 @ 05:06)   IMPRESSION:  Small volume subarachnoid hemorrhage remains visible in right inferior   frontal sulci. No evidence of new hemorrhage. No significant mass effect   upon the brain.

## 2024-06-20 NOTE — PROVIDER CONTACT NOTE (OTHER) - ASSESSMENT
PT ordered. chart reviewed and noted. pt received in ED, semifowler position in stretcher, cardiac monitor, agreeable to PT. pt tolerated session well. pt left as received, 0/10 pain throughout session, all lines intact, will follow, NAD, RN and MD aware of function

## 2024-06-21 LAB
CULTURE RESULTS: SIGNIFICANT CHANGE UP
SPECIMEN SOURCE: SIGNIFICANT CHANGE UP

## 2024-07-11 ENCOUNTER — APPOINTMENT (OUTPATIENT)
Dept: NEUROSURGERY | Facility: CLINIC | Age: 79
End: 2024-07-11
Payer: MEDICARE

## 2024-07-11 VITALS
OXYGEN SATURATION: 97 % | DIASTOLIC BLOOD PRESSURE: 99 MMHG | BODY MASS INDEX: 28.52 KG/M2 | HEIGHT: 62 IN | HEART RATE: 98 BPM | WEIGHT: 155 LBS | SYSTOLIC BLOOD PRESSURE: 169 MMHG | TEMPERATURE: 98 F

## 2024-07-11 DIAGNOSIS — I60.9 NONTRAUMATIC SUBARACHNOID HEMORRHAGE, UNSPECIFIED: ICD-10-CM

## 2024-07-11 DIAGNOSIS — S06.6XAA TRAUMATIC SUBARACHNOID HEMORRHAGE WITH LOSS OF CONSCIOUSNESS STATUS UNKNOWN, INITIAL ENCOUNTER: ICD-10-CM

## 2024-07-11 PROCEDURE — 99204 OFFICE O/P NEW MOD 45 MIN: CPT

## 2024-07-16 ENCOUNTER — APPOINTMENT (OUTPATIENT)
Dept: FAMILY MEDICINE | Facility: CLINIC | Age: 79
End: 2024-07-16
Payer: MEDICARE

## 2024-07-16 ENCOUNTER — LABORATORY RESULT (OUTPATIENT)
Age: 79
End: 2024-07-16

## 2024-07-16 VITALS
HEART RATE: 103 BPM | SYSTOLIC BLOOD PRESSURE: 179 MMHG | DIASTOLIC BLOOD PRESSURE: 82 MMHG | BODY MASS INDEX: 28.43 KG/M2 | HEIGHT: 62 IN | WEIGHT: 154.5 LBS

## 2024-07-16 DIAGNOSIS — Z00.00 ENCOUNTER FOR GENERAL ADULT MEDICAL EXAMINATION W/OUT ABNORMAL FINDINGS: ICD-10-CM

## 2024-07-16 DIAGNOSIS — F39 UNSPECIFIED MOOD [AFFECTIVE] DISORDER: ICD-10-CM

## 2024-07-16 DIAGNOSIS — E03.9 HYPOTHYROIDISM, UNSPECIFIED: ICD-10-CM

## 2024-07-16 DIAGNOSIS — E55.9 VITAMIN D DEFICIENCY, UNSPECIFIED: ICD-10-CM

## 2024-07-16 DIAGNOSIS — Z09 ENCOUNTER FOR FOLLOW-UP EXAMINATION AFTER COMPLETED TREATMENT FOR CONDITIONS OTHER THAN MALIGNANT NEOPLASM: ICD-10-CM

## 2024-07-16 PROCEDURE — G0439: CPT

## 2024-07-16 PROCEDURE — 36415 COLL VENOUS BLD VENIPUNCTURE: CPT

## 2024-07-16 PROCEDURE — 99214 OFFICE O/P EST MOD 30 MIN: CPT | Mod: 25

## 2024-07-16 RX ORDER — METOPROLOL SUCCINATE 25 MG/1
25 TABLET, EXTENDED RELEASE ORAL
Qty: 90 | Refills: 0 | Status: ACTIVE | COMMUNITY
Start: 2024-07-16 | End: 1900-01-01

## 2024-07-16 RX ORDER — ROSUVASTATIN CALCIUM 20 MG/1
20 TABLET, FILM COATED ORAL
Qty: 90 | Refills: 0 | Status: ACTIVE | COMMUNITY
Start: 2024-07-16 | End: 1900-01-01

## 2024-07-17 ENCOUNTER — NON-APPOINTMENT (OUTPATIENT)
Age: 79
End: 2024-07-17

## 2024-07-17 ENCOUNTER — APPOINTMENT (OUTPATIENT)
Dept: CT IMAGING | Facility: CLINIC | Age: 79
End: 2024-07-17
Payer: MEDICARE

## 2024-07-17 LAB
25(OH)D3 SERPL-MCNC: 19.4 NG/ML
ALBUMIN SERPL ELPH-MCNC: 4.6 G/DL
ALP BLD-CCNC: 72 U/L
ALT SERPL-CCNC: 20 U/L
ANION GAP SERPL CALC-SCNC: 14 MMOL/L
AST SERPL-CCNC: 21 U/L
BASOPHILS # BLD AUTO: 0.03 K/UL
BASOPHILS NFR BLD AUTO: 0.5 %
BILIRUB SERPL-MCNC: 0.2 MG/DL
BUN SERPL-MCNC: 16 MG/DL
CALCIUM SERPL-MCNC: 9.9 MG/DL
CHLORIDE SERPL-SCNC: 99 MMOL/L
CHOLEST SERPL-MCNC: 321 MG/DL
CO2 SERPL-SCNC: 23 MMOL/L
CREAT SERPL-MCNC: 0.85 MG/DL
EGFR: 71 ML/MIN/1.73M2
EOSINOPHIL # BLD AUTO: 0.23 K/UL
EOSINOPHIL NFR BLD AUTO: 3.5 %
GLUCOSE SERPL-MCNC: 93 MG/DL
HCT VFR BLD CALC: 40.8 %
HDLC SERPL-MCNC: 65 MG/DL
HGB BLD-MCNC: 13 G/DL
IMM GRANULOCYTES NFR BLD AUTO: 0.2 %
LDLC SERPL CALC-MCNC: 203 MG/DL
LYMPHOCYTES # BLD AUTO: 2.98 K/UL
LYMPHOCYTES NFR BLD AUTO: 45.1 %
MAN DIFF?: NORMAL
MCHC RBC-ENTMCNC: 30.4 PG
MCHC RBC-ENTMCNC: 31.9 GM/DL
MCV RBC AUTO: 95.6 FL
MONOCYTES # BLD AUTO: 0.41 K/UL
MONOCYTES NFR BLD AUTO: 6.2 %
NEUTROPHILS # BLD AUTO: 2.95 K/UL
NEUTROPHILS NFR BLD AUTO: 44.5 %
NONHDLC SERPL-MCNC: 256 MG/DL
PLATELET # BLD AUTO: 314 K/UL
POTASSIUM SERPL-SCNC: 4.5 MMOL/L
PROT SERPL-MCNC: 7.7 G/DL
RBC # BLD: 4.27 M/UL
RBC # FLD: 13.8 %
SODIUM SERPL-SCNC: 136 MMOL/L
TRIGL SERPL-MCNC: 271 MG/DL
TSH SERPL-ACNC: 63.2 UIU/ML
WBC # FLD AUTO: 6.61 K/UL

## 2024-07-17 PROCEDURE — 70450 CT HEAD/BRAIN W/O DYE: CPT | Mod: 26

## 2024-07-17 RX ORDER — ERGOCALCIFEROL 1.25 MG/1
1.25 MG CAPSULE, LIQUID FILLED ORAL
Qty: 12 | Refills: 0 | Status: ACTIVE | COMMUNITY
Start: 2024-07-17 | End: 1900-01-01

## 2024-07-23 ENCOUNTER — APPOINTMENT (OUTPATIENT)
Dept: CARDIOLOGY | Facility: CLINIC | Age: 79
End: 2024-07-23
Payer: MEDICARE

## 2024-07-23 ENCOUNTER — TRANSCRIPTION ENCOUNTER (OUTPATIENT)
Age: 79
End: 2024-07-23

## 2024-07-23 VITALS
OXYGEN SATURATION: 97 % | DIASTOLIC BLOOD PRESSURE: 100 MMHG | SYSTOLIC BLOOD PRESSURE: 160 MMHG | BODY MASS INDEX: 28.52 KG/M2 | RESPIRATION RATE: 16 BRPM | HEIGHT: 62 IN | HEART RATE: 74 BPM | WEIGHT: 155 LBS

## 2024-07-23 DIAGNOSIS — R01.1 CARDIAC MURMUR, UNSPECIFIED: ICD-10-CM

## 2024-07-23 DIAGNOSIS — I48.91 UNSPECIFIED ATRIAL FIBRILLATION: ICD-10-CM

## 2024-07-23 DIAGNOSIS — Z78.9 OTHER SPECIFIED HEALTH STATUS: ICD-10-CM

## 2024-07-23 DIAGNOSIS — E78.5 HYPERLIPIDEMIA, UNSPECIFIED: ICD-10-CM

## 2024-07-23 DIAGNOSIS — I10 ESSENTIAL (PRIMARY) HYPERTENSION: ICD-10-CM

## 2024-07-23 PROCEDURE — G2211 COMPLEX E/M VISIT ADD ON: CPT

## 2024-07-23 PROCEDURE — 93000 ELECTROCARDIOGRAM COMPLETE: CPT

## 2024-07-23 PROCEDURE — 99204 OFFICE O/P NEW MOD 45 MIN: CPT

## 2024-07-23 NOTE — PHYSICAL EXAM
[Well Developed] : well developed [Well Nourished] : well nourished [Abnormal Gait] : abnormal gait [No Cyanosis] : no cyanosis [No Clubbing] : no clubbing [Normal] : moves all extremities, no focal deficits, normal speech [de-identified] : Inappropriate affect [de-identified] : Normocephalic atraumatic [de-identified] : Regular S1-S2 with a grade 2/6 systolic murmur and 1+ bilateral ankle and pedal edema [de-identified] : Inappropriate affect and response.

## 2024-07-23 NOTE — REASON FOR VISIT
[FreeTextEntry1] : 79-year-old female presents here for cardiac evaluation with concerns about a diagnosis of atrial fibrillation and management strategy.  The patient, is accompanied by her daughter who translates and provides much of the history.  November through December 2023, patient was hospitalized and diagnosed with atrial fibrillation. Patient, who has a long history of noncompliance with medical therapy, took her meds for a short period of time stopping them on her own. Subsequently sustained a fall and had a subarachnoid bleed but she had already stopped her Eliquis well before then.  There are no reported symptoms of chest pain, shortness of breath, palpitations. No PND, orthopnea or edema.  No reported history of hypertension diabetes or tobacco use. The patient does have hyperlipidemia.  She has not been on any sustained medical therapy for hyperlipidemia or blood pressure in the past.  Comorbidities of schizoaffective disorder seem to dramatically affect her overall care and capacity for compliance with directives.

## 2024-07-23 NOTE — ASSESSMENT
[FreeTextEntry1] : ECG: Normal sinus rhythm at 74 bpm first-degree block and diffuse nonspecific T wave abnormalities.  Laboratory data: ----7/16/2024 Chol----321 HDL----65 LDL---203 Trigl---271  Impression: 1.  Reported history of atrial fibrillation.  Now on low-dose metoprolol. History of a remote subarachnoid hemorrhage with a neuro and/or neurosurgery follow-up planned shortly. Currently not on any anticoagulation therapy, both by choice and because of the subarachnoid.  2.  Elevated blood pressure with an uncertain history of hypertension.  3.  Substantial hyperlipidemia with therapy just having begun recently.  Plan: 1.Serial blood pressure monitoring should be performed  2.  Echocardiography looking for structural heart disease that would predispose to A-fib and assess for hypertensive heart disease  3 Holter monitoring

## 2024-07-24 ENCOUNTER — APPOINTMENT (OUTPATIENT)
Dept: NEUROLOGY | Facility: CLINIC | Age: 79
End: 2024-07-24

## 2024-08-06 ENCOUNTER — APPOINTMENT (OUTPATIENT)
Dept: NEUROLOGY | Facility: CLINIC | Age: 79
End: 2024-08-06

## 2024-08-06 PROCEDURE — 99213 OFFICE O/P EST LOW 20 MIN: CPT

## 2024-08-06 PROCEDURE — G2211 COMPLEX E/M VISIT ADD ON: CPT

## 2024-08-06 NOTE — DATA REVIEWED
[de-identified] : Head CT from July 17 showed resolution of her previously demonstrated subarachnoid hemorrhage

## 2024-08-06 NOTE — PHYSICAL EXAM
[Person] : oriented to person [Place] : oriented to place [Time] : oriented to time [Remote Intact] : remote memory intact [Registration Intact] : recent registration memory intact [Span Intact] : the attention span was normal [Concentration Intact] : normal concentrating ability [Visual Intact] : visual attention was ~T not ~L decreased [Naming Objects] : no difficulty naming common objects [Repeating Phrases] : no difficulty repeating a phrase [Writing A Sentence] : no difficulty writing a sentence [Fluency] : fluency intact [Comprehension] : comprehension intact [Reading] : reading intact [Current Events] : adequate knowledge of current events [Past History] : adequate knowledge of personal past history [Cranial Nerves Optic (II)] : visual acuity intact bilaterally,  visual fields full to confrontation, pupils equal round and reactive to light [Cranial Nerves Oculomotor (III)] : extraocular motion intact [Cranial Nerves Trigeminal (V)] : facial sensation intact symmetrically [Cranial Nerves Facial (VII)] : face symmetrical [Cranial Nerves Vestibulocochlear (VIII)] : hearing was intact bilaterally [Cranial Nerves Glossopharyngeal (IX)] : tongue and palate midline [Cranial Nerves Accessory (XI - Cranial And Spinal)] : head turning and shoulder shrug symmetric [Cranial Nerves Hypoglossal (XII)] : there was no tongue deviation with protrusion [Motor Tone] : muscle tone was normal in all four extremities [Motor Strength] : muscle strength was normal in all four extremities [Involuntary Movements] : no involuntary movements were seen [No Muscle Atrophy] : normal bulk in all four extremities [Sensation Tactile Decrease] : light touch was intact [Sensation Pain / Temperature Decrease] : pain and temperature was intact [Sensation Vibration Decrease] : vibration was intact [Proprioception] : proprioception was intact [Tremor] : a tremor present [2+] : Patella left 2+ [Sclera] : the sclera and conjunctiva were normal [PERRL With Normal Accommodation] : pupils were equal in size, round, reactive to light, with normal accommodation [Extraocular Movements] : extraocular movements were intact [No APD] : no afferent pupillary defect [No DREW] : no internuclear ophthalmoplegia [Full Visual Field] : full visual field [Paresis Pronator Drift Right-Sided] : no pronator drift on the right [Paresis Pronator Drift Left-Sided] : no pronator drift on the left [Motor Strength Upper Extremities Bilaterally] : strength was normal in both upper extremities [Motor Strength Lower Extremities Bilaterally] : strength was normal in both lower extremities [Coordination - Dysmetria Impaired Finger-to-Nose Bilateral] : not present [FreeTextEntry8] : Cautious gait with daughter as assist

## 2024-08-06 NOTE — ASSESSMENT
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 She has a lot of fear when walking which likely impacts her impaired gait. She will continue with physical therapy at home to hopefully build up confidence with walking. Regarding her memory it seems to be fairly stable. Her extrapyramidal side effects that were seen at her previous visit last year are resolved. I would not start any medicine for memory. She will be seeing psychology. Will see her back in 6 months for reevaluation.

## 2024-08-06 NOTE — CONSULT LETTER
[Dear  ___] : Dear  [unfilled], [Courtesy Letter:] : I had the pleasure of seeing your patient, [unfilled], in my office today. [Please see my note below.] : Please see my note below. [Consult Closing:] : Thank you very much for allowing me to participate in the care of this patient.  If you have any questions, please do not hesitate to contact me. [Sincerely,] : Sincerely, [FreeTextEntry3] : Renny Esteban M.D., Ph.D. DPN-N [facility] at [place] Director, Division of Neurology Director, Comprehensive Stroke Center [facility]

## 2024-08-06 NOTE — HISTORY OF PRESENT ILLNESS
[FreeTextEntry1] :     Post hospital visit September 21, 2023: This is a 78-year-old woman who was seen in the hospital at the end of last year for weakness as well as possible dementia. She has a history of schizophrenia and depression and was on multiple medications which was causing side effects. She was in the hospital for a month and was progressively deconditioned and needed to go to subacute rehab for an extended period of time. She is here today with her daughter for posthospital follow-up. Her daughter is concerned about dementia. She is also concerned about restarting her psychiatric medications. It does not appear that she is seen a psychiatrist recently. She is here today for neurologic follow-up.  Follow-up August 6, 2024: This is a 79-year-old woman who presents today with memory issues. She has a history of schizoaffective disorder and when was seen in the hospital with memory disorder and weakness last year was thought to be possibly due to some of her psychotropic medications and extrapyramidal side effects. She recently fell. She was found to have a subarachnoid hemorrhage which is likely on a traumatic basis and being followed by neurosurgery. We had tried to get her to see neuropsychology after her last visit but she was not able to find 1. She appears from chart and per the daughter a bit resistant at times to treatment based on her schizoaffective disorder. She is here today for neurologic follow-up.

## 2024-08-06 NOTE — REVIEW OF SYSTEMS
[Memory Lapses or Loss] : memory loss [Difficulty Walking] : difficulty walking [Frequent Falls] : frequent falls [As Noted in HPI] : as noted in HPI [Negative] : Heme/Lymph

## 2024-08-06 NOTE — CONSULT LETTER
[Dear  ___] : Dear  [unfilled], [Courtesy Letter:] : I had the pleasure of seeing your patient, [unfilled], in my office today. [Please see my note below.] : Please see my note below. [Consult Closing:] : Thank you very much for allowing me to participate in the care of this patient.  If you have any questions, please do not hesitate to contact me. [Sincerely,] : Sincerely, [FreeTextEntry3] : Renny Esteban M.D., Ph.D. DPN-N [facility] Neurology at [place] Director, Division of Neurology Director, Comprehensive Stroke Center [facility]

## 2024-08-06 NOTE — DATA REVIEWED
[de-identified] : Head CT from July 17 showed resolution of her previously demonstrated subarachnoid hemorrhage

## 2024-08-28 ENCOUNTER — APPOINTMENT (OUTPATIENT)
Dept: CARDIOLOGY | Facility: CLINIC | Age: 79
End: 2024-08-28
Payer: MEDICARE

## 2024-08-28 PROCEDURE — 93306 TTE W/DOPPLER COMPLETE: CPT

## 2024-09-04 ENCOUNTER — APPOINTMENT (OUTPATIENT)
Dept: CARDIOLOGY | Facility: CLINIC | Age: 79
End: 2024-09-04
Payer: MEDICARE

## 2024-09-04 VITALS
SYSTOLIC BLOOD PRESSURE: 160 MMHG | HEART RATE: 80 BPM | DIASTOLIC BLOOD PRESSURE: 96 MMHG | HEIGHT: 62 IN | BODY MASS INDEX: 29.44 KG/M2 | WEIGHT: 160 LBS | RESPIRATION RATE: 12 BRPM

## 2024-09-04 DIAGNOSIS — E78.5 HYPERLIPIDEMIA, UNSPECIFIED: ICD-10-CM

## 2024-09-04 DIAGNOSIS — I10 ESSENTIAL (PRIMARY) HYPERTENSION: ICD-10-CM

## 2024-09-04 DIAGNOSIS — I48.91 UNSPECIFIED ATRIAL FIBRILLATION: ICD-10-CM

## 2024-09-04 PROCEDURE — 99214 OFFICE O/P EST MOD 30 MIN: CPT

## 2024-09-04 PROCEDURE — G2211 COMPLEX E/M VISIT ADD ON: CPT

## 2024-09-04 RX ORDER — ATORVASTATIN CALCIUM 20 MG/1
20 TABLET, FILM COATED ORAL
Qty: 90 | Refills: 1 | Status: ACTIVE | COMMUNITY
Start: 2024-09-04 | End: 1900-01-01

## 2024-09-04 NOTE — ASSESSMENT
[FreeTextEntry1] : ECHOCARDIOGRAM 8/28/24: LVEF 65% RA and LA normal in size Trace MR Ascending aorta dilated measuring 3.9 cm  HOLTER MONITOR 7/23/24: Baseline SR with 1st degree AVB Average HR 65 bpm max 94, min 45 PAC 3/hr  Laboratory data: ----7/16/2024 Chol----321 HDL----65 LDL---203 Trigl---271  IMPRESSION:  1. Hx of reported atrial fibrillation during ER evaluation for agitation. Was prescribed Eliquis but was noncompliant. She had a remote subarachnoid hemorrhage. Currently off AC due to her choice and also hx of bleed. Holter monitoring did not capture any Afib recurrence. Has no symptoms of palpitations or SOB.   2. Her echocardiogram showed no evidence of atrial enlargement that would predispose her to afib. LVEF was normal. Mild ascending aorta dilatation noted, possibly related to HTN. Results discussed with patient and daughter.   3. Blood pressure not well controlled. Patient noncompliant with medications.   4. Lipids elevated. Patient not taking Rosuvastatin, citing difficulty sleeping.   PLAN:  1. Continue current CV medications at current doses. Advised not to miss doses of Metopolol.   2. Refer to EP. Consider possible ILR for monitoring of Afib recurrence.   3. Daughter advised to monitor patient's blood pressure for 7 days and call us with results. If home BP elevated while patient compliant with Metoprolol, will augment antihypertensives.   4. Patient advised to avoid salt.   Clinical follow-up in 2 months or sooner if needed.

## 2024-09-04 NOTE — REASON FOR VISIT
[FreeTextEntry1] : JULIA ARTEAGA is a 79 year-old  F presents here for cardiac follow-up and to discuss the results of her holter monitor and echocardiogram.   Was seen for initial cardiac evaluation with concerns regarding new onset atrial fibrillation diagnosed in December 2023. Patient, who has a long history of noncompliance with medical therapy, took her meds for a short period of time stopping them on her own. Subsequently sustained a fall and had a subarachnoid bleed but she had already stopped her Eliquis well before then.  She also has a hx of HLD (medical therapy recently started), schizoaffective disorder seem to dramatically affect her overall care and capacity for compliance with directives.

## 2024-09-04 NOTE — HISTORY OF PRESENT ILLNESS
[FreeTextEntry1] : Patient accompanied by her daughter who is translating for her per her request. She reports difficulty sleeping after taking Rosuvastatin. States it makes her feel "weird" and has not been taking it. Metoprolol is also being taken sporadically. Patient denies any chest pain, palpitations, shortness of breath or dizziness. States she feels well.

## 2024-09-04 NOTE — PHYSICAL EXAM
[Well Developed] : well developed [Well Nourished] : well nourished [No Acute Distress] : no acute distress [Normal Conjunctiva] : normal conjunctiva [Normal Venous Pressure] : normal venous pressure [No Carotid Bruit] : no carotid bruit [Normal S1, S2] : normal S1, S2 [No Murmur] : no murmur [No Rub] : no rub [No Gallop] : no gallop [Clear Lung Fields] : clear lung fields [Good Air Entry] : good air entry [No Respiratory Distress] : no respiratory distress  [Soft] : abdomen soft [Non Tender] : non-tender [No Masses/organomegaly] : no masses/organomegaly [Normal Bowel Sounds] : normal bowel sounds [Normal Gait] : normal gait [No Edema] : no edema [No Cyanosis] : no cyanosis [No Clubbing] : no clubbing [No Varicosities] : no varicosities [No Rash] : no rash [No Skin Lesions] : no skin lesions [Moves all extremities] : moves all extremities [No Focal Deficits] : no focal deficits [Normal Speech] : normal speech [Alert and Oriented] : alert and oriented [de-identified] : memory lapses, fast speech

## 2024-12-22 NOTE — BH CONSULTATION LIAISON PROGRESS NOTE - NSBHPSYCHOLCOGABN_PSY_A_CORE
RECEIVING UNIT ED HANDOFF REVIEW    ED Nurse Handoff Report was reviewed by: Shey Combs RN on December 21, 2024 at 9:01 PM        disoriented to situation
